# Patient Record
Sex: FEMALE | Race: WHITE | Employment: UNEMPLOYED | URBAN - METROPOLITAN AREA
[De-identification: names, ages, dates, MRNs, and addresses within clinical notes are randomized per-mention and may not be internally consistent; named-entity substitution may affect disease eponyms.]

---

## 2017-01-03 ENCOUNTER — HOSPITAL ENCOUNTER (OUTPATIENT)
Age: 27
Discharge: HOME | End: 2017-01-03
Attending: OBSTETRICS & GYNECOLOGY | Admitting: OBSTETRICS & GYNECOLOGY
Payer: COMMERCIAL

## 2017-01-03 ENCOUNTER — ROUTINE PRENATAL (OUTPATIENT)
Dept: OBGYN | Age: 27
End: 2017-01-03

## 2017-01-03 VITALS
RESPIRATION RATE: 18 BRPM | HEART RATE: 67 BPM | TEMPERATURE: 97.6 F | SYSTOLIC BLOOD PRESSURE: 104 MMHG | DIASTOLIC BLOOD PRESSURE: 70 MMHG

## 2017-01-03 VITALS
WEIGHT: 110 LBS | DIASTOLIC BLOOD PRESSURE: 64 MMHG | SYSTOLIC BLOOD PRESSURE: 92 MMHG | HEART RATE: 76 BPM | BODY MASS INDEX: 21.66 KG/M2

## 2017-01-03 DIAGNOSIS — Z34.83 ENCOUNTER FOR SUPERVISION OF OTHER NORMAL PREGNANCY, THIRD TRIMESTER: Primary | ICD-10-CM

## 2017-01-03 DIAGNOSIS — Z34.83 ENCOUNTER FOR SUPERVISION OF OTHER NORMAL PREGNANCY, THIRD TRIMESTER: ICD-10-CM

## 2017-01-03 LAB
BACTERIA: ABNORMAL /HPF
BACTERIA: ABNORMAL /HPF
BILIRUBIN URINE: ABNORMAL
BILIRUBIN URINE: NEGATIVE
BILIRUBIN, POC: ABNORMAL
BLOOD URINE, POC: ABNORMAL
BLOOD, URINE: ABNORMAL
BLOOD, URINE: NEGATIVE
CLARITY, POC: CLEAR
CLARITY: ABNORMAL
CLARITY: CLEAR
COLOR, POC: YELLOW
COLOR: ABNORMAL
COLOR: YELLOW
EPITHELIAL CELLS, UA: ABNORMAL /HPF
EPITHELIAL CELLS, UA: ABNORMAL /HPF
GLUCOSE URINE, POC: ABNORMAL
GLUCOSE URINE: NEGATIVE MG/DL
GLUCOSE URINE: NEGATIVE MG/DL
KETONES, POC: ABNORMAL
KETONES, URINE: ABNORMAL MG/DL
KETONES, URINE: NEGATIVE MG/DL
LEUKOCYTE EST, POC: ABNORMAL
LEUKOCYTE ESTERASE, URINE: ABNORMAL
LEUKOCYTE ESTERASE, URINE: ABNORMAL
NITRITE, POC: ABNORMAL
NITRITE, URINE: NEGATIVE
NITRITE, URINE: NEGATIVE
PH UA: 7 (ref 5–9)
PH UA: 7.5 (ref 5–9)
PH, POC: 6.5
PROTEIN UA: ABNORMAL MG/DL
PROTEIN UA: NEGATIVE MG/DL
PROTEIN, POC: ABNORMAL
RBC UA: ABNORMAL /HPF (ref 0–2)
RBC UA: ABNORMAL /HPF (ref 0–2)
SPECIFIC GRAVITY UA: 1.01 (ref 1–1.03)
SPECIFIC GRAVITY UA: 1.02 (ref 1–1.03)
SPECIFIC GRAVITY, POC: 1.02
UROBILINOGEN, POC: ABNORMAL
UROBILINOGEN, URINE: 0.2 E.U./DL
UROBILINOGEN, URINE: 1 E.U./DL
WBC UA: ABNORMAL /HPF (ref 0–5)
WBC UA: ABNORMAL /HPF (ref 0–5)
YEAST: PRESENT

## 2017-01-03 PROCEDURE — 59025 FETAL NON-STRESS TEST: CPT

## 2017-01-03 PROCEDURE — S0028 INJECTION, FAMOTIDINE, 20 MG: HCPCS | Performed by: OBSTETRICS & GYNECOLOGY

## 2017-01-03 PROCEDURE — 2580000003 HC RX 258: Performed by: OBSTETRICS & GYNECOLOGY

## 2017-01-03 PROCEDURE — 96374 THER/PROPH/DIAG INJ IV PUSH: CPT

## 2017-01-03 PROCEDURE — 81001 URINALYSIS AUTO W/SCOPE: CPT

## 2017-01-03 PROCEDURE — 96361 HYDRATE IV INFUSION ADD-ON: CPT

## 2017-01-03 PROCEDURE — 6360000002 HC RX W HCPCS: Performed by: OBSTETRICS & GYNECOLOGY

## 2017-01-03 PROCEDURE — 99213 OFFICE O/P EST LOW 20 MIN: CPT | Performed by: OBSTETRICS & GYNECOLOGY

## 2017-01-03 PROCEDURE — 59025 FETAL NON-STRESS TEST: CPT | Performed by: OBSTETRICS & GYNECOLOGY

## 2017-01-03 PROCEDURE — 99219 PR INITIAL OBSERVATION CARE/DAY 50 MINUTES: CPT | Performed by: OBSTETRICS & GYNECOLOGY

## 2017-01-03 PROCEDURE — 99284 EMERGENCY DEPT VISIT MOD MDM: CPT

## 2017-01-03 PROCEDURE — 2500000003 HC RX 250 WO HCPCS: Performed by: OBSTETRICS & GYNECOLOGY

## 2017-01-03 PROCEDURE — 96375 TX/PRO/DX INJ NEW DRUG ADDON: CPT

## 2017-01-03 RX ORDER — ACETAMINOPHEN 325 MG/1
650 TABLET ORAL EVERY 4 HOURS PRN
Status: DISCONTINUED | OUTPATIENT
Start: 2017-01-03 | End: 2017-01-04 | Stop reason: HOSPADM

## 2017-01-03 RX ORDER — ONDANSETRON 2 MG/ML
4 INJECTION INTRAMUSCULAR; INTRAVENOUS EVERY 6 HOURS PRN
Status: DISCONTINUED | OUTPATIENT
Start: 2017-01-03 | End: 2017-01-04 | Stop reason: HOSPADM

## 2017-01-03 RX ORDER — SODIUM CHLORIDE, SODIUM LACTATE, POTASSIUM CHLORIDE, AND CALCIUM CHLORIDE .6; .31; .03; .02 G/100ML; G/100ML; G/100ML; G/100ML
1000 INJECTION, SOLUTION INTRAVENOUS ONCE
Status: COMPLETED | OUTPATIENT
Start: 2017-01-03 | End: 2017-01-03

## 2017-01-03 RX ORDER — NALBUPHINE HCL 10 MG/ML
10 AMPUL (ML) INJECTION
Status: DISCONTINUED | OUTPATIENT
Start: 2017-01-03 | End: 2017-01-04 | Stop reason: HOSPADM

## 2017-01-03 RX ORDER — SODIUM CHLORIDE, SODIUM LACTATE, POTASSIUM CHLORIDE, CALCIUM CHLORIDE 600; 310; 30; 20 MG/100ML; MG/100ML; MG/100ML; MG/100ML
INJECTION, SOLUTION INTRAVENOUS CONTINUOUS
Status: DISCONTINUED | OUTPATIENT
Start: 2017-01-03 | End: 2017-01-04 | Stop reason: HOSPADM

## 2017-01-03 RX ADMIN — FAMOTIDINE 20 MG: 10 INJECTION, SOLUTION INTRAVENOUS at 21:19

## 2017-01-03 RX ADMIN — NALBUPHINE HYDROCHLORIDE 10 MG: 10 INJECTION, SOLUTION INTRAMUSCULAR; INTRAVENOUS; SUBCUTANEOUS at 19:57

## 2017-01-03 RX ADMIN — SODIUM CHLORIDE, POTASSIUM CHLORIDE, SODIUM LACTATE AND CALCIUM CHLORIDE 1000 ML: 600; 310; 30; 20 INJECTION, SOLUTION INTRAVENOUS at 19:56

## 2017-01-03 ASSESSMENT — PAIN SCALES - GENERAL: PAINLEVEL_OUTOF10: 6

## 2017-01-05 LAB — URINE CULTURE, ROUTINE: NORMAL

## 2017-01-10 ENCOUNTER — HOSPITAL ENCOUNTER (OUTPATIENT)
Age: 27
Discharge: HOME | End: 2017-01-11
Attending: OBSTETRICS & GYNECOLOGY | Admitting: OBSTETRICS & GYNECOLOGY
Payer: COMMERCIAL

## 2017-01-11 ENCOUNTER — TELEPHONE (OUTPATIENT)
Dept: OBGYN | Age: 27
End: 2017-01-11

## 2017-01-11 VITALS
SYSTOLIC BLOOD PRESSURE: 128 MMHG | TEMPERATURE: 97.6 F | HEART RATE: 75 BPM | WEIGHT: 109.56 LBS | BODY MASS INDEX: 21.58 KG/M2 | RESPIRATION RATE: 18 BRPM | DIASTOLIC BLOOD PRESSURE: 77 MMHG

## 2017-01-11 PROCEDURE — 59025 FETAL NON-STRESS TEST: CPT

## 2017-01-11 PROCEDURE — 59025 FETAL NON-STRESS TEST: CPT | Performed by: OBSTETRICS & GYNECOLOGY

## 2017-01-11 PROCEDURE — 99213 OFFICE O/P EST LOW 20 MIN: CPT | Performed by: OBSTETRICS & GYNECOLOGY

## 2017-01-11 PROCEDURE — 84112 EVAL AMNIOTIC FLUID PROTEIN: CPT

## 2017-01-11 PROCEDURE — 99283 EMERGENCY DEPT VISIT LOW MDM: CPT

## 2017-01-11 RX ORDER — ONDANSETRON 2 MG/ML
4 INJECTION INTRAMUSCULAR; INTRAVENOUS EVERY 6 HOURS PRN
Status: DISCONTINUED | OUTPATIENT
Start: 2017-01-11 | End: 2017-01-11 | Stop reason: HOSPADM

## 2017-01-11 RX ORDER — ACETAMINOPHEN 325 MG/1
650 TABLET ORAL EVERY 4 HOURS PRN
Status: DISCONTINUED | OUTPATIENT
Start: 2017-01-11 | End: 2017-01-11 | Stop reason: HOSPADM

## 2017-01-16 ENCOUNTER — ROUTINE PRENATAL (OUTPATIENT)
Dept: OBGYN | Age: 27
End: 2017-01-16

## 2017-01-16 VITALS
BODY MASS INDEX: 21.47 KG/M2 | WEIGHT: 109 LBS | SYSTOLIC BLOOD PRESSURE: 100 MMHG | HEART RATE: 82 BPM | DIASTOLIC BLOOD PRESSURE: 70 MMHG

## 2017-01-16 DIAGNOSIS — O34.219 PREVIOUS CESAREAN DELIVERY AFFECTING PREGNANCY: ICD-10-CM

## 2017-01-16 DIAGNOSIS — A74.9 CHLAMYDIA INFECTION AFFECTING PREGNANCY: ICD-10-CM

## 2017-01-16 DIAGNOSIS — O98.819 CHLAMYDIA INFECTION AFFECTING PREGNANCY: ICD-10-CM

## 2017-01-16 DIAGNOSIS — Z34.83 ENCOUNTER FOR SUPERVISION OF OTHER NORMAL PREGNANCY, THIRD TRIMESTER: Primary | ICD-10-CM

## 2017-01-16 PROCEDURE — 99213 OFFICE O/P EST LOW 20 MIN: CPT | Performed by: OBSTETRICS & GYNECOLOGY

## 2017-01-17 ENCOUNTER — HOSPITAL ENCOUNTER (OUTPATIENT)
Age: 27
Discharge: HOME | End: 2017-01-17
Attending: OBSTETRICS & GYNECOLOGY | Admitting: OBSTETRICS & GYNECOLOGY
Payer: COMMERCIAL

## 2017-01-17 VITALS
SYSTOLIC BLOOD PRESSURE: 123 MMHG | HEART RATE: 93 BPM | DIASTOLIC BLOOD PRESSURE: 78 MMHG | TEMPERATURE: 97.7 F | RESPIRATION RATE: 18 BRPM

## 2017-01-17 LAB
BILIRUBIN URINE: NEGATIVE
BLOOD, URINE: NEGATIVE
CLARITY: CLEAR
COLOR: YELLOW
GLUCOSE URINE: NEGATIVE MG/DL
KETONES, URINE: NEGATIVE MG/DL
LEUKOCYTE ESTERASE, URINE: ABNORMAL
NITRITE, URINE: NEGATIVE
PH UA: 6.5 (ref 5–9)
PROTEIN UA: NEGATIVE MG/DL
SPECIFIC GRAVITY UA: 1.01 (ref 1–1.03)
UROBILINOGEN, URINE: 0.2 E.U./DL

## 2017-01-17 PROCEDURE — 84112 EVAL AMNIOTIC FLUID PROTEIN: CPT

## 2017-01-17 PROCEDURE — 99283 EMERGENCY DEPT VISIT LOW MDM: CPT

## 2017-01-17 PROCEDURE — 99283 EMERGENCY DEPT VISIT LOW MDM: CPT | Performed by: OBSTETRICS & GYNECOLOGY

## 2017-01-17 PROCEDURE — 59025 FETAL NON-STRESS TEST: CPT

## 2017-01-17 PROCEDURE — 81001 URINALYSIS AUTO W/SCOPE: CPT

## 2017-01-17 PROCEDURE — 80307 DRUG TEST PRSMV CHEM ANLYZR: CPT

## 2017-01-18 LAB
AMPHETAMINE SCREEN, URINE: ABNORMAL
BACTERIA: NORMAL /HPF
BARBITURATE SCREEN URINE: POSITIVE
BENZODIAZEPINE SCREEN, URINE: ABNORMAL
CANNABINOID SCREEN URINE: ABNORMAL
COCAINE METABOLITE SCREEN URINE: ABNORMAL
EPITHELIAL CELLS, UA: NORMAL /HPF
Lab: ABNORMAL
OPIATE SCREEN URINE: ABNORMAL
PHENCYCLIDINE SCREEN URINE: ABNORMAL
RBC UA: NORMAL /HPF (ref 0–2)
WBC UA: NORMAL /HPF (ref 0–5)

## 2017-01-19 ENCOUNTER — HOSPITAL ENCOUNTER (INPATIENT)
Age: 27
LOS: 2 days | Discharge: HOME | DRG: 560 | End: 2017-01-21
Attending: OBSTETRICS & GYNECOLOGY | Admitting: OBSTETRICS & GYNECOLOGY
Payer: COMMERCIAL

## 2017-01-19 LAB
ALBUMIN SERPL-MCNC: 4 G/DL (ref 3.9–4.9)
ALP BLD-CCNC: 256 U/L (ref 40–130)
ALT SERPL-CCNC: 12 U/L (ref 0–33)
AMPHETAMINE SCREEN, URINE: ABNORMAL
ANION GAP SERPL CALCULATED.3IONS-SCNC: 14 MEQ/L (ref 7–13)
AST SERPL-CCNC: 17 U/L (ref 0–35)
BACTERIA: NORMAL /HPF
BARBITURATE SCREEN URINE: POSITIVE
BASOPHILS ABSOLUTE: 0 K/UL (ref 0–0.2)
BASOPHILS RELATIVE PERCENT: 0.3 %
BENZODIAZEPINE SCREEN, URINE: ABNORMAL
BILIRUB SERPL-MCNC: 0.2 MG/DL (ref 0–1.2)
BILIRUBIN URINE: NEGATIVE
BLOOD, URINE: NEGATIVE
BUN BLDV-MCNC: 6 MG/DL (ref 6–20)
CALCIUM SERPL-MCNC: 8.8 MG/DL (ref 8.6–10.2)
CANNABINOID SCREEN URINE: ABNORMAL
CHLORIDE BLD-SCNC: 99 MEQ/L (ref 98–107)
CLARITY: ABNORMAL
CO2: 19 MEQ/L (ref 22–29)
COCAINE METABOLITE SCREEN URINE: ABNORMAL
COLOR: YELLOW
CREAT SERPL-MCNC: 0.37 MG/DL (ref 0.5–0.9)
EOSINOPHILS ABSOLUTE: 0.1 K/UL (ref 0–0.7)
EOSINOPHILS RELATIVE PERCENT: 0.9 %
EPITHELIAL CELLS, UA: NORMAL /HPF
GFR AFRICAN AMERICAN: >60
GFR NON-AFRICAN AMERICAN: >60
GLOBULIN: 2.6 G/DL (ref 2.3–3.5)
GLUCOSE BLD-MCNC: 80 MG/DL (ref 74–109)
GLUCOSE URINE: NEGATIVE MG/DL
HCT VFR BLD CALC: 34.2 % (ref 37–47)
HEMOGLOBIN: 11 G/DL (ref 12–16)
HEPATITIS B SURFACE ANTIGEN INTERPRETATION: NORMAL
KETONES, URINE: NEGATIVE MG/DL
LEUKOCYTE ESTERASE, URINE: ABNORMAL
LYMPHOCYTES ABSOLUTE: 2.9 K/UL (ref 1–4.8)
LYMPHOCYTES RELATIVE PERCENT: 25.2 %
Lab: ABNORMAL
MCH RBC QN AUTO: 30 PG (ref 27–31.3)
MCHC RBC AUTO-ENTMCNC: 32.2 % (ref 33–37)
MCV RBC AUTO: 93.3 FL (ref 82–100)
MONOCYTES ABSOLUTE: 0.5 K/UL (ref 0.2–0.8)
MONOCYTES RELATIVE PERCENT: 4.6 %
NEUTROPHILS ABSOLUTE: 8 K/UL (ref 1.4–6.5)
NEUTROPHILS RELATIVE PERCENT: 69 %
NITRITE, URINE: NEGATIVE
OPIATE SCREEN URINE: ABNORMAL
PDW BLD-RTO: 12.8 % (ref 11.5–14.5)
PH UA: 6 (ref 5–9)
PHENCYCLIDINE SCREEN URINE: ABNORMAL
PLATELET # BLD: 252 K/UL (ref 130–400)
POTASSIUM SERPL-SCNC: 4 MEQ/L (ref 3.5–5.1)
PROTEIN UA: NEGATIVE MG/DL
RBC # BLD: 3.67 M/UL (ref 4.2–5.4)
RBC UA: NORMAL /HPF (ref 0–2)
SODIUM BLD-SCNC: 132 MEQ/L (ref 132–144)
SPECIFIC GRAVITY UA: 1.01 (ref 1–1.03)
TOTAL PROTEIN: 6.6 G/DL (ref 6.4–8.1)
UROBILINOGEN, URINE: 0.2 E.U./DL
WBC # BLD: 11.6 K/UL (ref 4.8–10.8)
WBC UA: NORMAL /HPF (ref 0–5)

## 2017-01-19 PROCEDURE — 6360000002 HC RX W HCPCS: Performed by: OBSTETRICS & GYNECOLOGY

## 2017-01-19 PROCEDURE — 1220000000 HC SEMI PRIVATE OB R&B

## 2017-01-19 PROCEDURE — 36415 COLL VENOUS BLD VENIPUNCTURE: CPT

## 2017-01-19 PROCEDURE — 86592 SYPHILIS TEST NON-TREP QUAL: CPT

## 2017-01-19 PROCEDURE — 86901 BLOOD TYPING SEROLOGIC RH(D): CPT

## 2017-01-19 PROCEDURE — 2580000003 HC RX 258: Performed by: OBSTETRICS & GYNECOLOGY

## 2017-01-19 PROCEDURE — 3E0P7GC INTRODUCTION OF OTHER THERAPEUTIC SUBSTANCE INTO FEMALE REPRODUCTIVE, VIA NATURAL OR ARTIFICIAL OPENING: ICD-10-PCS | Performed by: OBSTETRICS & GYNECOLOGY

## 2017-01-19 PROCEDURE — 86850 RBC ANTIBODY SCREEN: CPT

## 2017-01-19 PROCEDURE — 80053 COMPREHEN METABOLIC PANEL: CPT

## 2017-01-19 PROCEDURE — 81001 URINALYSIS AUTO W/SCOPE: CPT

## 2017-01-19 PROCEDURE — 4A1HXCZ MONITORING OF PRODUCTS OF CONCEPTION, CARDIAC RATE, EXTERNAL APPROACH: ICD-10-PCS | Performed by: OBSTETRICS & GYNECOLOGY

## 2017-01-19 PROCEDURE — 6370000000 HC RX 637 (ALT 250 FOR IP): Performed by: OBSTETRICS & GYNECOLOGY

## 2017-01-19 PROCEDURE — 86900 BLOOD TYPING SEROLOGIC ABO: CPT

## 2017-01-19 PROCEDURE — 85025 COMPLETE CBC W/AUTO DIFF WBC: CPT

## 2017-01-19 PROCEDURE — 80307 DRUG TEST PRSMV CHEM ANLYZR: CPT

## 2017-01-19 PROCEDURE — 87340 HEPATITIS B SURFACE AG IA: CPT

## 2017-01-19 RX ORDER — SODIUM CHLORIDE 0.9 % (FLUSH) 0.9 %
10 SYRINGE (ML) INJECTION PRN
Status: DISCONTINUED | OUTPATIENT
Start: 2017-01-19 | End: 2017-01-20

## 2017-01-19 RX ORDER — DOCUSATE SODIUM 100 MG/1
100 CAPSULE, LIQUID FILLED ORAL 2 TIMES DAILY
Status: DISCONTINUED | OUTPATIENT
Start: 2017-01-19 | End: 2017-01-20 | Stop reason: SDUPTHER

## 2017-01-19 RX ORDER — ONDANSETRON 2 MG/ML
4 INJECTION INTRAMUSCULAR; INTRAVENOUS EVERY 6 HOURS PRN
Status: DISCONTINUED | OUTPATIENT
Start: 2017-01-19 | End: 2017-01-20

## 2017-01-19 RX ORDER — ZOLPIDEM TARTRATE 5 MG/1
5 TABLET ORAL NIGHTLY PRN
Status: DISCONTINUED | OUTPATIENT
Start: 2017-01-19 | End: 2017-01-20

## 2017-01-19 RX ORDER — VANCOMYCIN HYDROCHLORIDE 1 G/200ML
1000 INJECTION, SOLUTION INTRAVENOUS EVERY 12 HOURS
Status: DISCONTINUED | OUTPATIENT
Start: 2017-01-19 | End: 2017-01-19

## 2017-01-19 RX ORDER — SODIUM CHLORIDE 0.9 % (FLUSH) 0.9 %
10 SYRINGE (ML) INJECTION EVERY 12 HOURS SCHEDULED
Status: DISCONTINUED | OUTPATIENT
Start: 2017-01-19 | End: 2017-01-20 | Stop reason: SDUPTHER

## 2017-01-19 RX ORDER — NALBUPHINE HCL 10 MG/ML
10 AMPUL (ML) INJECTION EVERY 4 HOURS PRN
Status: DISCONTINUED | OUTPATIENT
Start: 2017-01-19 | End: 2017-01-20

## 2017-01-19 RX ORDER — SODIUM CHLORIDE, SODIUM LACTATE, POTASSIUM CHLORIDE, CALCIUM CHLORIDE 600; 310; 30; 20 MG/100ML; MG/100ML; MG/100ML; MG/100ML
INJECTION, SOLUTION INTRAVENOUS CONTINUOUS
Status: DISCONTINUED | OUTPATIENT
Start: 2017-01-19 | End: 2017-01-20

## 2017-01-19 RX ORDER — ACETAMINOPHEN 500 MG
1000 TABLET ORAL EVERY 6 HOURS PRN
Status: DISCONTINUED | OUTPATIENT
Start: 2017-01-19 | End: 2017-01-20 | Stop reason: SDUPTHER

## 2017-01-19 RX ADMIN — SODIUM CHLORIDE, POTASSIUM CHLORIDE, SODIUM LACTATE AND CALCIUM CHLORIDE: 600; 310; 30; 20 INJECTION, SOLUTION INTRAVENOUS at 21:31

## 2017-01-19 RX ADMIN — VANCOMYCIN HYDROCHLORIDE: 1 INJECTION, POWDER, LYOPHILIZED, FOR SOLUTION INTRAVENOUS at 22:26

## 2017-01-19 RX ADMIN — Medication 1 MILLI-UNITS/MIN: at 22:13

## 2017-01-19 RX ADMIN — ZOLPIDEM TARTRATE 5 MG: 5 TABLET, FILM COATED ORAL at 22:49

## 2017-01-20 ENCOUNTER — ANESTHESIA EVENT (OUTPATIENT)
Dept: LABOR AND DELIVERY | Age: 27
DRG: 560 | End: 2017-01-20
Payer: COMMERCIAL

## 2017-01-20 ENCOUNTER — ANESTHESIA (OUTPATIENT)
Dept: LABOR AND DELIVERY | Age: 27
DRG: 560 | End: 2017-01-20
Payer: COMMERCIAL

## 2017-01-20 VITALS — DIASTOLIC BLOOD PRESSURE: 64 MMHG | SYSTOLIC BLOOD PRESSURE: 108 MMHG

## 2017-01-20 LAB
ABO/RH: NORMAL
ANTIBODY SCREEN: NORMAL
RPR: NORMAL

## 2017-01-20 PROCEDURE — 2500000003 HC RX 250 WO HCPCS: Performed by: OBSTETRICS & GYNECOLOGY

## 2017-01-20 PROCEDURE — 2580000003 HC RX 258: Performed by: OBSTETRICS & GYNECOLOGY

## 2017-01-20 PROCEDURE — 6370000000 HC RX 637 (ALT 250 FOR IP): Performed by: OBSTETRICS & GYNECOLOGY

## 2017-01-20 PROCEDURE — 59409 OBSTETRICAL CARE: CPT | Performed by: OBSTETRICS & GYNECOLOGY

## 2017-01-20 PROCEDURE — 6360000002 HC RX W HCPCS: Performed by: OBSTETRICS & GYNECOLOGY

## 2017-01-20 PROCEDURE — 1220000000 HC SEMI PRIVATE OB R&B

## 2017-01-20 PROCEDURE — 2500000003 HC RX 250 WO HCPCS

## 2017-01-20 PROCEDURE — 3700000025 ANESTHESIA EPIDURAL BLOCK: Performed by: NURSE ANESTHETIST, CERTIFIED REGISTERED

## 2017-01-20 PROCEDURE — 7200000001 HC VAGINAL DELIVERY

## 2017-01-20 RX ORDER — IBUPROFEN 600 MG/1
600 TABLET ORAL EVERY 6 HOURS PRN
Status: DISCONTINUED | OUTPATIENT
Start: 2017-01-20 | End: 2017-01-20

## 2017-01-20 RX ORDER — MAGNESIUM OXIDE 400 MG/1
400 TABLET ORAL DAILY
Status: ON HOLD | COMMUNITY
End: 2017-01-21 | Stop reason: HOSPADM

## 2017-01-20 RX ORDER — FERROUS SULFATE 325(65) MG
325 TABLET ORAL
COMMUNITY
End: 2017-08-11 | Stop reason: SDUPTHER

## 2017-01-20 RX ORDER — OXYCODONE HYDROCHLORIDE AND ACETAMINOPHEN 5; 325 MG/1; MG/1
1 TABLET ORAL EVERY 4 HOURS PRN
Status: DISCONTINUED | OUTPATIENT
Start: 2017-01-20 | End: 2017-01-21 | Stop reason: HOSPADM

## 2017-01-20 RX ORDER — RANITIDINE 150 MG/1
150 TABLET ORAL 2 TIMES DAILY
Status: ON HOLD | COMMUNITY
End: 2017-01-21 | Stop reason: HOSPADM

## 2017-01-20 RX ORDER — FAMOTIDINE 20 MG/1
20 TABLET, FILM COATED ORAL 2 TIMES DAILY
Status: DISCONTINUED | OUTPATIENT
Start: 2017-01-20 | End: 2017-01-21 | Stop reason: HOSPADM

## 2017-01-20 RX ORDER — SODIUM CHLORIDE 0.9 % (FLUSH) 0.9 %
10 SYRINGE (ML) INJECTION EVERY 12 HOURS SCHEDULED
Status: DISCONTINUED | OUTPATIENT
Start: 2017-01-20 | End: 2017-01-21 | Stop reason: HOSPADM

## 2017-01-20 RX ORDER — ASCORBIC ACID, CHOLECALCIFEROL, .ALPHA.-TOCOPHEROL ACETATE, DL-, PYRIDOXINE HYDROCHLORIDE, FOLIC ACID, CYANOCOBALAMIN, BIOTIN, CALCIUM CARBONATE, FERROUS ASPARTO GLYCINATE, IRON, POTASSIUM IODIDE, MAGNESIUM OXIDE, DOCONEXENT AND LOWBUSH BLUEBERRY 60; 1000; 10; 26; 400; 13; 280; 80; 9; 9; 150; 25; 350; 25; 600 MG/1; [IU]/1; [IU]/1; MG/1; UG/1; UG/1; UG/1; MG/1; MG/1; MG/1; UG/1; MG/1; MG/1; MG/1; UG/1
CAPSULE, GELATIN COATED ORAL
Status: ON HOLD | COMMUNITY
End: 2017-01-21 | Stop reason: HOSPADM

## 2017-01-20 RX ORDER — SODIUM CHLORIDE, SODIUM LACTATE, POTASSIUM CHLORIDE, CALCIUM CHLORIDE 600; 310; 30; 20 MG/100ML; MG/100ML; MG/100ML; MG/100ML
INJECTION, SOLUTION INTRAVENOUS CONTINUOUS
Status: DISCONTINUED | OUTPATIENT
Start: 2017-01-20 | End: 2017-01-21 | Stop reason: HOSPADM

## 2017-01-20 RX ORDER — OMEPRAZOLE 20 MG/1
20 CAPSULE, DELAYED RELEASE ORAL DAILY
Status: ON HOLD | COMMUNITY
End: 2017-01-21 | Stop reason: HOSPADM

## 2017-01-20 RX ORDER — LANOLIN 100 %
OINTMENT (GRAM) TOPICAL PRN
Status: DISCONTINUED | OUTPATIENT
Start: 2017-01-20 | End: 2017-01-21 | Stop reason: HOSPADM

## 2017-01-20 RX ORDER — ONDANSETRON 2 MG/ML
4 INJECTION INTRAMUSCULAR; INTRAVENOUS EVERY 6 HOURS PRN
Status: DISCONTINUED | OUTPATIENT
Start: 2017-01-20 | End: 2017-01-21 | Stop reason: HOSPADM

## 2017-01-20 RX ORDER — FERROUS SULFATE 325(65) MG
325 TABLET ORAL 2 TIMES DAILY WITH MEALS
Status: DISCONTINUED | OUTPATIENT
Start: 2017-01-20 | End: 2017-01-21 | Stop reason: HOSPADM

## 2017-01-20 RX ORDER — NALOXONE HYDROCHLORIDE 0.4 MG/ML
0.4 INJECTION, SOLUTION INTRAMUSCULAR; INTRAVENOUS; SUBCUTANEOUS PRN
Status: DISCONTINUED | OUTPATIENT
Start: 2017-01-20 | End: 2017-01-20

## 2017-01-20 RX ORDER — CLINDAMYCIN PHOSPHATE 900 MG/50ML
900 INJECTION INTRAVENOUS EVERY 8 HOURS
Status: DISCONTINUED | OUTPATIENT
Start: 2017-01-20 | End: 2017-01-20

## 2017-01-20 RX ORDER — SODIUM CHLORIDE 0.9 % (FLUSH) 0.9 %
10 SYRINGE (ML) INJECTION PRN
Status: DISCONTINUED | OUTPATIENT
Start: 2017-01-20 | End: 2017-01-21 | Stop reason: HOSPADM

## 2017-01-20 RX ORDER — OXYCODONE HYDROCHLORIDE AND ACETAMINOPHEN 5; 325 MG/1; MG/1
2 TABLET ORAL EVERY 4 HOURS PRN
Status: DISCONTINUED | OUTPATIENT
Start: 2017-01-20 | End: 2017-01-21 | Stop reason: HOSPADM

## 2017-01-20 RX ORDER — DIPHENHYDRAMINE HYDROCHLORIDE 50 MG/ML
25 INJECTION INTRAMUSCULAR; INTRAVENOUS EVERY 4 HOURS PRN
Status: DISCONTINUED | OUTPATIENT
Start: 2017-01-20 | End: 2017-01-20

## 2017-01-20 RX ORDER — METOCLOPRAMIDE 10 MG/1
10 TABLET ORAL 4 TIMES DAILY
Status: ON HOLD | COMMUNITY
End: 2017-01-21 | Stop reason: HOSPADM

## 2017-01-20 RX ORDER — ONDANSETRON 2 MG/ML
4 INJECTION INTRAMUSCULAR; INTRAVENOUS EVERY 6 HOURS PRN
Status: DISCONTINUED | OUTPATIENT
Start: 2017-01-20 | End: 2017-01-20

## 2017-01-20 RX ORDER — NALBUPHINE HCL 10 MG/ML
5 AMPUL (ML) INJECTION EVERY 4 HOURS PRN
Status: DISCONTINUED | OUTPATIENT
Start: 2017-01-20 | End: 2017-01-20

## 2017-01-20 RX ORDER — DOCUSATE SODIUM 100 MG/1
100 CAPSULE, LIQUID FILLED ORAL 2 TIMES DAILY
Status: DISCONTINUED | OUTPATIENT
Start: 2017-01-20 | End: 2017-01-21 | Stop reason: HOSPADM

## 2017-01-20 RX ORDER — POLYETHYLENE GLYCOL 3350 17 G/17G
17 POWDER, FOR SOLUTION ORAL DAILY
Status: ON HOLD | COMMUNITY
End: 2017-01-21 | Stop reason: HOSPADM

## 2017-01-20 RX ORDER — .PYRIDOXINE HYDROCHLORIDE, CYANOCOBALAMIN, CALCIUM CARBONATE AND FOLIC ACID 75; 12; 200; 1 MG/1; UG/1; MG/1; MG/1
TABLET, COATED ORAL
Status: ON HOLD | COMMUNITY
End: 2017-01-21 | Stop reason: HOSPADM

## 2017-01-20 RX ORDER — ACETAMINOPHEN 325 MG/1
650 TABLET ORAL EVERY 4 HOURS PRN
Status: DISCONTINUED | OUTPATIENT
Start: 2017-01-20 | End: 2017-01-21 | Stop reason: HOSPADM

## 2017-01-20 RX ADMIN — Medication: at 17:29

## 2017-01-20 RX ADMIN — Medication 1 MILLI-UNITS/MIN: at 09:00

## 2017-01-20 RX ADMIN — ONDANSETRON 4 MG: 2 INJECTION INTRAMUSCULAR; INTRAVENOUS at 09:07

## 2017-01-20 RX ADMIN — FERROUS SULFATE TAB 325 MG (65 MG ELEMENTAL FE) 325 MG: 325 (65 FE) TAB at 17:29

## 2017-01-20 RX ADMIN — FAMOTIDINE 20 MG: 20 TABLET ORAL at 15:32

## 2017-01-20 RX ADMIN — Medication 12 ML/HR: at 08:49

## 2017-01-20 RX ADMIN — SODIUM CHLORIDE, PRESERVATIVE FREE 10 ML: 5 INJECTION INTRAVENOUS at 19:57

## 2017-01-20 RX ADMIN — CLINDAMYCIN IN 5 PERCENT DEXTROSE 900 MG: 18 INJECTION, SOLUTION INTRAVENOUS at 09:08

## 2017-01-20 RX ADMIN — FAMOTIDINE 20 MG: 20 TABLET ORAL at 19:56

## 2017-01-20 RX ADMIN — SODIUM CHLORIDE, POTASSIUM CHLORIDE, SODIUM LACTATE AND CALCIUM CHLORIDE: 600; 310; 30; 20 INJECTION, SOLUTION INTRAVENOUS at 05:50

## 2017-01-20 RX ADMIN — DOCUSATE SODIUM 100 MG: 100 CAPSULE, LIQUID FILLED ORAL at 19:56

## 2017-01-20 RX ADMIN — SODIUM CHLORIDE, POTASSIUM CHLORIDE, SODIUM LACTATE AND CALCIUM CHLORIDE: 600; 310; 30; 20 INJECTION, SOLUTION INTRAVENOUS at 14:08

## 2017-01-20 RX ADMIN — DOCUSATE SODIUM 100 MG: 100 CAPSULE, LIQUID FILLED ORAL at 15:32

## 2017-01-20 RX ADMIN — SODIUM CHLORIDE, POTASSIUM CHLORIDE, SODIUM LACTATE AND CALCIUM CHLORIDE: 600; 310; 30; 20 INJECTION, SOLUTION INTRAVENOUS at 08:41

## 2017-01-20 RX ADMIN — DIPHENHYDRAMINE HYDROCHLORIDE 25 MG: 50 INJECTION, SOLUTION INTRAMUSCULAR; INTRAVENOUS at 00:46

## 2017-01-21 VITALS
RESPIRATION RATE: 20 BRPM | OXYGEN SATURATION: 98 % | DIASTOLIC BLOOD PRESSURE: 70 MMHG | HEART RATE: 52 BPM | SYSTOLIC BLOOD PRESSURE: 110 MMHG | TEMPERATURE: 97.7 F

## 2017-01-21 LAB
BASOPHILS ABSOLUTE: 0 K/UL (ref 0–0.2)
BASOPHILS RELATIVE PERCENT: 0.3 %
EOSINOPHILS ABSOLUTE: 0.1 K/UL (ref 0–0.7)
EOSINOPHILS RELATIVE PERCENT: 1.1 %
HCT VFR BLD CALC: 25.3 % (ref 37–47)
HEMOGLOBIN: 8.6 G/DL (ref 12–16)
LYMPHOCYTES ABSOLUTE: 3.3 K/UL (ref 1–4.8)
LYMPHOCYTES RELATIVE PERCENT: 26.8 %
MCH RBC QN AUTO: 31.2 PG (ref 27–31.3)
MCHC RBC AUTO-ENTMCNC: 33.9 % (ref 33–37)
MCV RBC AUTO: 91.9 FL (ref 82–100)
MONOCYTES ABSOLUTE: 1.1 K/UL (ref 0.2–0.8)
MONOCYTES RELATIVE PERCENT: 8.5 %
NEUTROPHILS ABSOLUTE: 7.8 K/UL (ref 1.4–6.5)
NEUTROPHILS RELATIVE PERCENT: 63.3 %
PDW BLD-RTO: 13 % (ref 11.5–14.5)
PLATELET # BLD: 177 K/UL (ref 130–400)
RBC # BLD: 2.75 M/UL (ref 4.2–5.4)
WBC # BLD: 12.4 K/UL (ref 4.8–10.8)

## 2017-01-21 PROCEDURE — 7200000001 HC VAGINAL DELIVERY

## 2017-01-21 PROCEDURE — 85025 COMPLETE CBC W/AUTO DIFF WBC: CPT

## 2017-01-21 PROCEDURE — 99238 HOSP IP/OBS DSCHRG MGMT 30/<: CPT | Performed by: OBSTETRICS & GYNECOLOGY

## 2017-01-21 PROCEDURE — 6370000000 HC RX 637 (ALT 250 FOR IP): Performed by: OBSTETRICS & GYNECOLOGY

## 2017-01-21 PROCEDURE — 36415 COLL VENOUS BLD VENIPUNCTURE: CPT

## 2017-01-21 RX ORDER — ACETAMINOPHEN 500 MG
1000 TABLET ORAL EVERY 6 HOURS PRN
Qty: 30 TABLET | Refills: 3 | Status: SHIPPED | OUTPATIENT
Start: 2017-01-21 | End: 2019-11-05 | Stop reason: ALTCHOICE

## 2017-01-21 RX ADMIN — DOCUSATE SODIUM 100 MG: 100 CAPSULE, LIQUID FILLED ORAL at 08:54

## 2017-01-21 RX ADMIN — FERROUS SULFATE TAB 325 MG (65 MG ELEMENTAL FE) 325 MG: 325 (65 FE) TAB at 08:54

## 2017-03-08 ENCOUNTER — OFFICE VISIT (OUTPATIENT)
Dept: OBGYN | Age: 27
End: 2017-03-08

## 2017-03-08 VITALS
SYSTOLIC BLOOD PRESSURE: 100 MMHG | HEART RATE: 64 BPM | DIASTOLIC BLOOD PRESSURE: 62 MMHG | BODY MASS INDEX: 18.67 KG/M2 | WEIGHT: 94.8 LBS

## 2017-03-08 PROCEDURE — 99213 OFFICE O/P EST LOW 20 MIN: CPT | Performed by: OBSTETRICS & GYNECOLOGY

## 2017-03-14 ASSESSMENT — ENCOUNTER SYMPTOMS
VOMITING: 0
NAUSEA: 0
COUGH: 0
SHORTNESS OF BREATH: 0

## 2017-07-26 ENCOUNTER — OFFICE VISIT (OUTPATIENT)
Dept: OBGYN | Age: 27
End: 2017-07-26

## 2017-07-26 VITALS
WEIGHT: 93.8 LBS | DIASTOLIC BLOOD PRESSURE: 64 MMHG | HEART RATE: 84 BPM | BODY MASS INDEX: 18.47 KG/M2 | SYSTOLIC BLOOD PRESSURE: 102 MMHG

## 2017-07-26 DIAGNOSIS — N91.5 OLIGOMENORRHEA: Primary | ICD-10-CM

## 2017-07-26 DIAGNOSIS — N91.2 ABSENT MENSES: ICD-10-CM

## 2017-07-26 LAB
CONTROL: YES
PREGNANCY TEST URINE, POC: NORMAL

## 2017-07-26 PROCEDURE — 99213 OFFICE O/P EST LOW 20 MIN: CPT | Performed by: OBSTETRICS & GYNECOLOGY

## 2017-07-26 PROCEDURE — 81025 URINE PREGNANCY TEST: CPT | Performed by: OBSTETRICS & GYNECOLOGY

## 2017-07-26 ASSESSMENT — ENCOUNTER SYMPTOMS
VOMITING: 0
COUGH: 0
SHORTNESS OF BREATH: 0
NAUSEA: 0

## 2017-08-11 ENCOUNTER — INITIAL PRENATAL (OUTPATIENT)
Dept: OBGYN | Age: 27
End: 2017-08-11

## 2017-08-11 VITALS
HEART RATE: 76 BPM | DIASTOLIC BLOOD PRESSURE: 58 MMHG | SYSTOLIC BLOOD PRESSURE: 94 MMHG | WEIGHT: 96 LBS | BODY MASS INDEX: 18.91 KG/M2

## 2017-08-11 DIAGNOSIS — Z34.81 OTHER NORMAL PREGNANCY, NOT FIRST, FIRST TRIMESTER: Primary | ICD-10-CM

## 2017-08-11 DIAGNOSIS — Z34.81 OTHER NORMAL PREGNANCY, NOT FIRST, FIRST TRIMESTER: ICD-10-CM

## 2017-08-11 DIAGNOSIS — O34.219 PREVIOUS CESAREAN DELIVERY AFFECTING PREGNANCY: ICD-10-CM

## 2017-08-11 LAB
BACTERIA: NORMAL /HPF
BILIRUBIN URINE: NEGATIVE
BLOOD, URINE: NEGATIVE
CLARITY: CLEAR
COLOR: YELLOW
EPITHELIAL CELLS, UA: NORMAL /HPF
GLUCOSE URINE: NEGATIVE MG/DL
KETONES, URINE: NEGATIVE MG/DL
LEUKOCYTE ESTERASE, URINE: ABNORMAL
NITRITE, URINE: NEGATIVE
PH UA: 8 (ref 5–9)
PROTEIN UA: ABNORMAL MG/DL
RBC UA: NORMAL /HPF (ref 0–2)
SPECIFIC GRAVITY UA: 1.02 (ref 1–1.03)
UROBILINOGEN, URINE: 0.2 E.U./DL
WBC UA: NORMAL /HPF (ref 0–5)

## 2017-08-11 PROCEDURE — 0500F INITIAL PRENATAL CARE VISIT: CPT | Performed by: OBSTETRICS & GYNECOLOGY

## 2017-08-11 RX ORDER — ASCORBIC ACID, CHOLECALCIFEROL, .ALPHA.-TOCOPHEROL ACETATE, DL-, PYRIDOXINE HYDROCHLORIDE, FOLIC ACID, CYANOCOBALAMIN, BIOTIN, CALCIUM CARBONATE, FERROUS ASPARTO GLYCINATE, IRON, POTASSIUM IODIDE, MAGNESIUM OXIDE, DOCONEXENT AND LOWBUSH BLUEBERRY 60; 1000; 10; 26; 400; 13; 280; 80; 9; 9; 150; 25; 350; 25; 600 MG/1; [IU]/1; [IU]/1; MG/1; UG/1; UG/1; UG/1; MG/1; MG/1; MG/1; UG/1; MG/1; MG/1; MG/1; UG/1
1 CAPSULE, GELATIN COATED ORAL DAILY
Qty: 30 CAPSULE | Refills: 9 | Status: SHIPPED | OUTPATIENT
Start: 2017-08-11 | End: 2018-08-10

## 2017-08-11 RX ORDER — FERROUS SULFATE 325(65) MG
325 TABLET ORAL
Qty: 30 TABLET | Refills: 8 | Status: SHIPPED | OUTPATIENT
Start: 2017-08-11 | End: 2018-08-10

## 2017-08-11 RX ORDER — OMEPRAZOLE 20 MG/1
20 TABLET, DELAYED RELEASE ORAL DAILY
Qty: 30 TABLET | Refills: 3 | Status: SHIPPED | OUTPATIENT
Start: 2017-08-11 | End: 2017-10-27

## 2017-08-11 RX ORDER — RANITIDINE 150 MG/1
150 TABLET ORAL 2 TIMES DAILY
Qty: 60 TABLET | Refills: 8 | Status: SHIPPED | OUTPATIENT
Start: 2017-08-11 | End: 2018-08-10

## 2017-08-13 LAB
AMPHETAMINE SCREEN, URINE: NEGATIVE NG/ML
BARBITURATE SCREEN URINE: NEGATIVE NG/ML
BENZODIAZEPINE SCREEN, URINE: NEGATIVE NG/ML
CANNABINOID SCREEN URINE: NEGATIVE NG/ML
COCAINE METABOLITE SCREEN URINE: NEGATIVE NG/ML
CREATININE URINE: 107.4 MG/DL (ref 20–400)
Lab: NORMAL
MDMA URINE: NEGATIVE NG/ML
METHADONE SCREEN, URINE: NEGATIVE NG/ML
OPIATE SCREEN URINE: NEGATIVE NG/ML
OXYCODONE SCREEN URINE: NEGATIVE NG/ML
PHENCYCLIDINE SCREEN URINE: NEGATIVE NG/ML
PROPOXYPHENE SCREEN: NEGATIVE NG/ML
URINE CULTURE, ROUTINE: NORMAL

## 2017-08-14 DIAGNOSIS — Z34.81 OTHER NORMAL PREGNANCY, NOT FIRST, FIRST TRIMESTER: ICD-10-CM

## 2017-08-14 DIAGNOSIS — N91.5 OLIGOMENORRHEA: ICD-10-CM

## 2017-08-14 LAB
ABO/RH: NORMAL
ANTIBODY SCREEN: NORMAL
BASOPHILS ABSOLUTE: 0.1 K/UL (ref 0–0.2)
BASOPHILS RELATIVE PERCENT: 0.7 %
EOSINOPHILS ABSOLUTE: 0.1 K/UL (ref 0–0.7)
EOSINOPHILS RELATIVE PERCENT: 1.7 %
GONADOTROPIN, CHORIONIC (HCG) QUANT: NORMAL MIU/ML
HCT VFR BLD CALC: 39.1 % (ref 37–47)
HEMOGLOBIN: 13 G/DL (ref 12–16)
HEPATITIS B SURFACE ANTIGEN INTERPRETATION: NORMAL
LYMPHOCYTES ABSOLUTE: 3.3 K/UL (ref 1–4.8)
LYMPHOCYTES RELATIVE PERCENT: 42.3 %
MCH RBC QN AUTO: 31.6 PG (ref 27–31.3)
MCHC RBC AUTO-ENTMCNC: 33.2 % (ref 33–37)
MCV RBC AUTO: 95 FL (ref 82–100)
MONOCYTES ABSOLUTE: 0.6 K/UL (ref 0.2–0.8)
MONOCYTES RELATIVE PERCENT: 7.4 %
NEUTROPHILS ABSOLUTE: 3.7 K/UL (ref 1.4–6.5)
NEUTROPHILS RELATIVE PERCENT: 47.9 %
PDW BLD-RTO: 14.2 % (ref 11.5–14.5)
PLATELET # BLD: 238 K/UL (ref 130–400)
RBC # BLD: 4.11 M/UL (ref 4.2–5.4)
RUBELLA ANTIBODY IGG: 45.5 IU/ML
WBC # BLD: 7.7 K/UL (ref 4.8–10.8)

## 2017-08-15 LAB
C. TRACHOMATIS DNA ,URINE: POSITIVE
HEMOGLOBIN A-1 QUANTITATION: 96.8 % (ref 95–97.9)
HEMOGLOBIN A2 QUANTITATION: 2.6 % (ref 2–3.5)
HEMOGLOBIN C QUANTITATION: 0 % (ref 0–0)
HEMOGLOBIN E QUANTITATION: 0 % (ref 0–0)
HEMOGLOBIN ELECTROPHORESIS: NORMAL
HEMOGLOBIN EVALUATION: NORMAL
HEMOGLOBIN F QUANTITATION: 0.6 % (ref 0–2.1)
HEMOGLOBIN OTHER: 0 % (ref 0–0)
HEMOGLOBIN S QUANTITATION: 0 % (ref 0–0)
N. GONORRHOEAE DNA, URINE: NEGATIVE
RPR: NORMAL
SICKLE CELL: NORMAL

## 2017-08-15 RX ORDER — AZITHROMYCIN 500 MG/1
1000 TABLET, FILM COATED ORAL ONCE
Qty: 2 TABLET | Refills: 0 | Status: SHIPPED | OUTPATIENT
Start: 2017-08-15 | End: 2017-08-15

## 2017-08-16 LAB
HIV-1 AND HIV-2 ANTIBODIES: NEGATIVE
VZV IGG SER QL IA: 238 IV

## 2017-08-22 LAB
EER NON INVASIVE PRENATAL ANEUPLOIDY: NORMAL
FETAL FRACTION: 4.2
FETAL GENDER: NORMAL
GESTATIONAL AGE (DAYS): 2
GESTATIONAL AGE(WEEKS): 11
Lab: NORMAL
MATERNAL WEIGHT: 96
MONOSOMY X: NORMAL
REPORT FETUS GENDER: YES
TRIPLOIDY (VANISHING TWIN): NORMAL
TRISOMY 13 RISK: NORMAL
TRISOMY 18 RISK ASSESSMENT: NORMAL
TRISOMY 21 RISK: NORMAL

## 2017-08-25 ENCOUNTER — ROUTINE PRENATAL (OUTPATIENT)
Dept: OBGYN | Age: 27
End: 2017-08-25

## 2017-08-25 VITALS
WEIGHT: 95.6 LBS | BODY MASS INDEX: 18.83 KG/M2 | DIASTOLIC BLOOD PRESSURE: 66 MMHG | SYSTOLIC BLOOD PRESSURE: 100 MMHG | HEART RATE: 68 BPM

## 2017-08-25 DIAGNOSIS — Z34.81 ENCOUNTER FOR SUPERVISION OF OTHER NORMAL PREGNANCY, FIRST TRIMESTER: Primary | ICD-10-CM

## 2017-08-25 DIAGNOSIS — O34.219 PREVIOUS CESAREAN DELIVERY AFFECTING PREGNANCY: ICD-10-CM

## 2017-08-25 LAB
BILIRUBIN, POC: NORMAL
BLOOD URINE, POC: NORMAL
CLARITY, POC: CLEAR
COLOR, POC: YELLOW
GLUCOSE URINE, POC: NORMAL
KETONES, POC: NORMAL
LEUKOCYTE EST, POC: NORMAL
NITRITE, POC: NORMAL
PH, POC: 6
PROTEIN, POC: NORMAL
SPECIFIC GRAVITY, POC: 1.03
UROBILINOGEN, POC: NORMAL

## 2017-08-25 PROCEDURE — 99213 OFFICE O/P EST LOW 20 MIN: CPT | Performed by: OBSTETRICS & GYNECOLOGY

## 2017-08-25 RX ORDER — AZITHROMYCIN 500 MG/1
1000 TABLET, FILM COATED ORAL ONCE
Qty: 2 TABLET | Refills: 0 | Status: SHIPPED | OUTPATIENT
Start: 2017-08-25 | End: 2017-08-25

## 2017-08-31 ENCOUNTER — HOSPITAL ENCOUNTER (OUTPATIENT)
Dept: ULTRASOUND IMAGING | Age: 27
Discharge: HOME | End: 2017-08-31
Payer: COMMERCIAL

## 2017-08-31 ENCOUNTER — HOSPITAL ENCOUNTER (EMERGENCY)
Age: 27
Discharge: HOME | End: 2017-08-31
Payer: COMMERCIAL

## 2017-08-31 VITALS
DIASTOLIC BLOOD PRESSURE: 86 MMHG | HEART RATE: 77 BPM | SYSTOLIC BLOOD PRESSURE: 117 MMHG | RESPIRATION RATE: 20 BRPM | TEMPERATURE: 98 F | OXYGEN SATURATION: 100 %

## 2017-08-31 DIAGNOSIS — J06.9 VIRAL UPPER RESPIRATORY ILLNESS: Primary | ICD-10-CM

## 2017-08-31 DIAGNOSIS — Z34.81 OTHER NORMAL PREGNANCY, NOT FIRST, FIRST TRIMESTER: ICD-10-CM

## 2017-08-31 PROCEDURE — 99282 EMERGENCY DEPT VISIT SF MDM: CPT

## 2017-08-31 PROCEDURE — 76801 OB US < 14 WKS SINGLE FETUS: CPT

## 2017-08-31 RX ORDER — DIPHENHYDRAMINE HCL 25 MG
25 CAPSULE ORAL EVERY 4 HOURS PRN
Qty: 1 CAPSULE | Refills: 0 | Status: SHIPPED | OUTPATIENT
Start: 2017-08-31 | End: 2017-09-10

## 2017-08-31 ASSESSMENT — PAIN DESCRIPTION - LOCATION: LOCATION: HEAD

## 2017-08-31 ASSESSMENT — ENCOUNTER SYMPTOMS
ANAL BLEEDING: 0
EYE PAIN: 0
SHORTNESS OF BREATH: 0
APNEA: 0
VOICE CHANGE: 0
PHOTOPHOBIA: 0
ABDOMINAL PAIN: 0
NAUSEA: 0
VOMITING: 0
COUGH: 1
ABDOMINAL DISTENTION: 0
EYE DISCHARGE: 0

## 2017-08-31 ASSESSMENT — PAIN DESCRIPTION - PAIN TYPE: TYPE: ACUTE PAIN

## 2017-08-31 ASSESSMENT — PAIN SCALES - GENERAL: PAINLEVEL_OUTOF10: 7

## 2017-09-02 LAB
Lab: NORMAL
REPORT: NORMAL
THIS TEST SENT TO: NORMAL

## 2017-09-25 ENCOUNTER — ROUTINE PRENATAL (OUTPATIENT)
Dept: OBGYN | Age: 27
End: 2017-09-25

## 2017-09-25 VITALS
WEIGHT: 97.6 LBS | DIASTOLIC BLOOD PRESSURE: 64 MMHG | SYSTOLIC BLOOD PRESSURE: 108 MMHG | BODY MASS INDEX: 19.22 KG/M2 | HEART RATE: 84 BPM

## 2017-09-25 DIAGNOSIS — O98.819 CHLAMYDIA INFECTION AFFECTING PREGNANCY: ICD-10-CM

## 2017-09-25 DIAGNOSIS — A74.9 CHLAMYDIA INFECTION AFFECTING PREGNANCY: ICD-10-CM

## 2017-09-25 DIAGNOSIS — O34.219 PREVIOUS CESAREAN DELIVERY AFFECTING PREGNANCY: ICD-10-CM

## 2017-09-25 DIAGNOSIS — Z34.82 ENCOUNTER FOR SUPERVISION OF OTHER NORMAL PREGNANCY, SECOND TRIMESTER: Primary | ICD-10-CM

## 2017-09-25 DIAGNOSIS — Z34.82 ENCOUNTER FOR SUPERVISION OF OTHER NORMAL PREGNANCY, SECOND TRIMESTER: ICD-10-CM

## 2017-09-25 LAB
BILIRUBIN, POC: NORMAL
BLOOD URINE, POC: NORMAL
CLARITY, POC: NORMAL
COLOR, POC: YELLOW
GLUCOSE URINE, POC: NORMAL
KETONES, POC: NORMAL
LEUKOCYTE EST, POC: NORMAL
NITRITE, POC: NORMAL
PH, POC: 7.5
PROTEIN, POC: NORMAL
SPECIFIC GRAVITY, POC: 1.01
UROBILINOGEN, POC: NORMAL

## 2017-09-25 PROCEDURE — 99213 OFFICE O/P EST LOW 20 MIN: CPT | Performed by: OBSTETRICS & GYNECOLOGY

## 2017-09-26 NOTE — TELEPHONE ENCOUNTER
Pt is calling today stating that her medication was not sent to her pharmacy. She states she asked for a refill at her appt yesterday.

## 2017-09-27 LAB
C. TRACHOMATIS DNA ,URINE: NEGATIVE
N. GONORRHOEAE DNA, URINE: NEGATIVE

## 2017-09-27 RX ORDER — BUTALBITAL, ACETAMINOPHEN AND CAFFEINE 50; 325; 40 MG/1; MG/1; MG/1
1-2 TABLET ORAL EVERY 6 HOURS PRN
Qty: 30 TABLET | Refills: 0 | Status: SHIPPED | OUTPATIENT
Start: 2017-09-27 | End: 2017-10-27 | Stop reason: SDUPTHER

## 2017-10-03 RX ORDER — BUTALBITAL, ACETAMINOPHEN AND CAFFEINE 300; 40; 50 MG/1; MG/1; MG/1
CAPSULE ORAL
Qty: 20 CAPSULE | Refills: 0 | OUTPATIENT
Start: 2017-10-03

## 2017-10-17 ENCOUNTER — HOSPITAL ENCOUNTER (OUTPATIENT)
Dept: ULTRASOUND IMAGING | Age: 27
Discharge: HOME | End: 2017-10-17
Payer: COMMERCIAL

## 2017-10-17 DIAGNOSIS — Z34.82 ENCOUNTER FOR SUPERVISION OF OTHER NORMAL PREGNANCY, SECOND TRIMESTER: ICD-10-CM

## 2017-10-17 PROCEDURE — 76805 OB US >/= 14 WKS SNGL FETUS: CPT

## 2017-10-27 ENCOUNTER — ROUTINE PRENATAL (OUTPATIENT)
Dept: OBGYN | Age: 27
End: 2017-10-27

## 2017-10-27 VITALS
WEIGHT: 98 LBS | SYSTOLIC BLOOD PRESSURE: 88 MMHG | DIASTOLIC BLOOD PRESSURE: 58 MMHG | HEART RATE: 76 BPM | BODY MASS INDEX: 19.3 KG/M2

## 2017-10-27 DIAGNOSIS — O34.219 PREVIOUS CESAREAN DELIVERY AFFECTING PREGNANCY: ICD-10-CM

## 2017-10-27 DIAGNOSIS — O98.811 CHLAMYDIA INFECTION AFFECTING PREGNANCY IN FIRST TRIMESTER: ICD-10-CM

## 2017-10-27 DIAGNOSIS — Z34.82 ENCOUNTER FOR SUPERVISION OF OTHER NORMAL PREGNANCY, SECOND TRIMESTER: Primary | ICD-10-CM

## 2017-10-27 DIAGNOSIS — A74.9 CHLAMYDIA INFECTION AFFECTING PREGNANCY IN FIRST TRIMESTER: ICD-10-CM

## 2017-10-27 LAB
BILIRUBIN, POC: ABNORMAL
BLOOD URINE, POC: ABNORMAL
CLARITY, POC: CLEAR
COLOR, POC: YELLOW
GLUCOSE URINE, POC: ABNORMAL
KETONES, POC: ABNORMAL
LEUKOCYTE EST, POC: ABNORMAL
NITRITE, POC: ABNORMAL
PH, POC: 6
PROTEIN, POC: ABNORMAL
SPECIFIC GRAVITY, POC: 1.02
UROBILINOGEN, POC: ABNORMAL

## 2017-10-27 PROCEDURE — 99213 OFFICE O/P EST LOW 20 MIN: CPT | Performed by: OBSTETRICS & GYNECOLOGY

## 2017-10-27 PROCEDURE — G8484 FLU IMMUNIZE NO ADMIN: HCPCS | Performed by: OBSTETRICS & GYNECOLOGY

## 2017-10-27 PROCEDURE — G8427 DOCREV CUR MEDS BY ELIG CLIN: HCPCS | Performed by: OBSTETRICS & GYNECOLOGY

## 2017-10-27 PROCEDURE — 4004F PT TOBACCO SCREEN RCVD TLK: CPT | Performed by: OBSTETRICS & GYNECOLOGY

## 2017-10-27 PROCEDURE — G8420 CALC BMI NORM PARAMETERS: HCPCS | Performed by: OBSTETRICS & GYNECOLOGY

## 2017-10-27 RX ORDER — BUTALBITAL, ACETAMINOPHEN AND CAFFEINE 50; 325; 40 MG/1; MG/1; MG/1
1-2 TABLET ORAL EVERY 6 HOURS PRN
Qty: 40 TABLET | Refills: 0 | Status: SHIPPED | OUTPATIENT
Start: 2017-10-27 | End: 2017-12-26 | Stop reason: SDUPTHER

## 2017-10-27 RX ORDER — OMEPRAZOLE 40 MG/1
40 CAPSULE, DELAYED RELEASE ORAL DAILY
Qty: 30 CAPSULE | Refills: 3 | Status: SHIPPED | OUTPATIENT
Start: 2017-10-27 | End: 2018-08-10 | Stop reason: ALTCHOICE

## 2017-10-27 NOTE — PROGRESS NOTES
OB visit      Peg Shaver is a 28y.o. year old female [id number] @ 19.3 wk , REY 3/20/2018 BY us 11 wk US not commensurate with LMP. POB: FTSVD @ 39 wks , girl Sj Brewster. Uncomplicated.  Emergent pLTCS @ 34 wks secondary to fetal distress/ IUGR, dwarfism/ left kidney failiure/ heart defects. SAB - unknown. Repeat LTCS , male , Sravani Biswas , 2017           PGYN: denies    PMH: denies    PSH: PC/S             Laparoscopy for ovarian cyst     MedS: denies    Allergic to amoxicillin - hives and throat swells                   Keflex - Hives    SOC hx : neg x 3    FH : grandfather - diabetes          Uncle mother side- diabetes. BP (!) 88/58   Pulse 76   Wt 98 lb (44.5 kg)   LMP 2017   BMI 19.30 kg/m²   Past Medical History:   Diagnosis Date    Anemia     Kidney stones     Unspecified asthma      Past Surgical History:   Procedure Laterality Date    OVARIAN CYST SURGERY      laparascopic sx    TONSILLECTOMY           Review of Systems  Constitutional: negative  Genitourinary:see above  Integument/breast: negative  Behavioral/Psych: negative  Endocrine: negative    All other systems reviewed and are negative. Physical Exam:  BP (!) 88/58   Pulse 76   Wt 98 lb (44.5 kg)   LMP 2017   BMI 19.30 kg/m²   Physical exam :   General Appearance: alert and oriented to person, place and time, well-developed and well-nourished, in no acute distress  Cardiovascular: normal rate, normal S1 and S2, no gallops, intact distal pulses and no carotid bruits  Abdomen: soft, non-tender, non-distended, normal bowel sounds, gravid uterus:    HOF : umbilicus    FHT : [id number] by BSUS    Assessment:     1. Encounter for supervision of other normal pregnancy, second trimester    2. Previous  delivery affecting pregnancy    3.  Chlamydia infection affecting pregnancy in first trimester        Plan:   Test of cure for chlamydia ordered today  Ultrasound for anatomy

## 2017-11-24 ENCOUNTER — ROUTINE PRENATAL (OUTPATIENT)
Dept: OBGYN | Age: 27
End: 2017-11-24

## 2017-11-24 VITALS
DIASTOLIC BLOOD PRESSURE: 62 MMHG | SYSTOLIC BLOOD PRESSURE: 100 MMHG | BODY MASS INDEX: 20.68 KG/M2 | WEIGHT: 105 LBS | HEART RATE: 84 BPM

## 2017-11-24 DIAGNOSIS — O34.219 PREVIOUS CESAREAN DELIVERY AFFECTING PREGNANCY: ICD-10-CM

## 2017-11-24 DIAGNOSIS — Z34.82 ENCOUNTER FOR SUPERVISION OF OTHER NORMAL PREGNANCY IN SECOND TRIMESTER: Primary | ICD-10-CM

## 2017-11-24 DIAGNOSIS — A74.9 CHLAMYDIA INFECTION AFFECTING PREGNANCY IN FIRST TRIMESTER: ICD-10-CM

## 2017-11-24 DIAGNOSIS — O98.811 CHLAMYDIA INFECTION AFFECTING PREGNANCY IN FIRST TRIMESTER: ICD-10-CM

## 2017-11-24 DIAGNOSIS — R00.2 HEART PALPITATIONS: ICD-10-CM

## 2017-11-24 DIAGNOSIS — M54.9 BACK PAIN AFFECTING PREGNANCY IN SECOND TRIMESTER: ICD-10-CM

## 2017-11-24 DIAGNOSIS — O99.891 BACK PAIN AFFECTING PREGNANCY IN SECOND TRIMESTER: ICD-10-CM

## 2017-11-24 DIAGNOSIS — G43.019 INTRACTABLE MIGRAINE WITHOUT AURA AND WITHOUT STATUS MIGRAINOSUS: ICD-10-CM

## 2017-11-24 DIAGNOSIS — Z3A.23 23 WEEKS GESTATION OF PREGNANCY: ICD-10-CM

## 2017-11-24 DIAGNOSIS — K52.9 CHRONIC DIARRHEA OF UNKNOWN ORIGIN: ICD-10-CM

## 2017-11-24 PROBLEM — Z34.90 SUPERVISION OF NORMAL PREGNANCY: Status: ACTIVE | Noted: 2017-11-24

## 2017-11-24 LAB
BILIRUBIN, POC: NORMAL
BLOOD URINE, POC: NORMAL
CLARITY, POC: CLEAR
COLOR, POC: YELLOW
GLUCOSE URINE, POC: NORMAL
KETONES, POC: NORMAL
LEUKOCYTE EST, POC: NORMAL
NITRITE, POC: NORMAL
PH, POC: 6.5
PROTEIN, POC: NORMAL
SPECIFIC GRAVITY, POC: 1.01
UROBILINOGEN, POC: NORMAL

## 2017-11-24 PROCEDURE — G8427 DOCREV CUR MEDS BY ELIG CLIN: HCPCS | Performed by: OBSTETRICS & GYNECOLOGY

## 2017-11-24 PROCEDURE — 4004F PT TOBACCO SCREEN RCVD TLK: CPT | Performed by: OBSTETRICS & GYNECOLOGY

## 2017-11-24 PROCEDURE — G8484 FLU IMMUNIZE NO ADMIN: HCPCS | Performed by: OBSTETRICS & GYNECOLOGY

## 2017-11-24 PROCEDURE — G8420 CALC BMI NORM PARAMETERS: HCPCS | Performed by: OBSTETRICS & GYNECOLOGY

## 2017-11-24 PROCEDURE — 99213 OFFICE O/P EST LOW 20 MIN: CPT | Performed by: OBSTETRICS & GYNECOLOGY

## 2017-11-24 RX ORDER — MAGNESIUM OXIDE 400 MG/1
400 TABLET ORAL EVERY MORNING
Qty: 30 TABLET | Refills: 1 | Status: SHIPPED | OUTPATIENT
Start: 2017-11-24 | End: 2017-12-26 | Stop reason: SDUPTHER

## 2017-11-24 RX ORDER — CYCLOBENZAPRINE HCL 10 MG
TABLET ORAL
Qty: 30 TABLET | Refills: 0 | Status: SHIPPED | OUTPATIENT
Start: 2017-11-24 | End: 2017-12-26 | Stop reason: SDUPTHER

## 2017-11-24 NOTE — PROGRESS NOTES
OB visit      Rodo Almonte is a 28y.o. year old female [id number] @ 23.3 wk , REY 3/20/2018 BY us 11 wk US not commensurate with LMP. POB: FTSVD @ 39 wks , girl Joesphine Niece. Uncomplicated.  Emergent pLTCS @ 34 wks secondary to fetal distress/ IUGR, dwarfism/ left kidney failiure/ heart defects. SAB - unknown. Repeat LTCS , male , Remus Pompa , 2017           PGYN: denies    PMH: denies    PSH: PC/S             Laparoscopy for ovarian cyst     MedS: denies    Allergic to amoxicillin - hives and throat swells                   Keflex - Hives    SOC hx : neg x 3    FH : grandfather - diabetes          Uncle mother side- diabetes. /62   Pulse 84   Wt 105 lb (47.6 kg)   LMP 2017   BMI 20.68 kg/m²   Past Medical History:   Diagnosis Date    Anemia     Intractable migraine without aura and without status migrainosus 2017    Kidney stones     Unspecified asthma(493.90)      Past Surgical History:   Procedure Laterality Date    OVARIAN CYST SURGERY      laparascopic sx    TONSILLECTOMY           Review of Systems  Constitutional: negative  Genitourinary:see above  Integument/breast: negative  Behavioral/Psych: negative  Endocrine: negative    All other systems reviewed and are negative. Physical Exam:  /62   Pulse 84   Wt 105 lb (47.6 kg)   LMP 2017   BMI 20.68 kg/m²   Physical exam :   General Appearance: alert and oriented to person, place and time, well-developed and well-nourished, in no acute distress  Cardiovascular: normal rate, normal S1 and S2, no gallops, intact distal pulses and no carotid bruits  Abdomen: soft, non-tender, non-distended, normal bowel sounds, gravid uterus:    HOF : umbilicus    FHT : [id number] by BSUS    Assessment:     1. Encounter for supervision of other normal pregnancy in second trimester    2. Intractable migraine without aura and without status migrainosus    3. Heart palpitations    4.  Chronic diarrhea of unknown origin    5. 23 weeks gestation of pregnancy    6. Previous  delivery affecting pregnancy    7. Chlamydia infection affecting pregnancy in first trimester    8. Back pain affecting pregnancy in second trimester        Plan:   Test of cure for chlamydia - negative   Ultrasound for anatomy - wnl . Patient encouraged to take prenatal vitamins  Magnesium oxide for migraines added   Flexeril for back pain   Referral for chronic diarrhea   ECG and cardiac enzymes for , fluttering sensation chest     Orders Placed This Encounter   Procedures    Troponin     Standing Status:   Future     Standing Expiration Date:   2018    CK     Standing Status:   Future     Standing Expiration Date:   2018    CK-Mb Index     Standing Status:   Future     Standing Expiration Date:   2018   Ros Leyva MD - [facility]     Referral Priority:   Routine     Referral Type:   Consult for Advice and Opinion     Referral Reason:   Specialty Services Required     Referred to Provider:   Prisca Betts MD     Requested Specialty:   Gastroenterology     Number of Visits Requested:   1    POCT Urinalysis No Micro (Auto)    EKG 12 lead     Standing Status:   Future     Standing Expiration Date:   2018     Order Specific Question:   Reason for Exam?     Answer:   Chest pain     Comments:   palpitation         Plan:   Ilan Cerda MD  Return in 3 weeks.      Follow-up in 4 weeks

## 2017-11-27 ENCOUNTER — HOSPITAL ENCOUNTER (OUTPATIENT)
Dept: NON INVASIVE DIAGNOSTICS | Age: 27
Discharge: HOME | End: 2017-11-27
Payer: COMMERCIAL

## 2017-11-27 DIAGNOSIS — R00.2 HEART PALPITATIONS: ICD-10-CM

## 2017-11-27 LAB
CK MB: 1.1 NG/ML (ref 0–3.8)
CREATINE KINASE-MB INDEX: 2.3 % (ref 0–3.5)
TOTAL CK: 48 U/L (ref 0–170)
TROPONIN: <0.01 NG/ML (ref 0–0.01)

## 2017-11-27 PROCEDURE — 93005 ELECTROCARDIOGRAM TRACING: CPT

## 2017-11-28 LAB
EKG ATRIAL RATE: 63 BPM
EKG P AXIS: 68 DEGREES
EKG P-R INTERVAL: 108 MS
EKG Q-T INTERVAL: 412 MS
EKG QRS DURATION: 92 MS
EKG QTC CALCULATION (BAZETT): 421 MS
EKG R AXIS: 62 DEGREES
EKG T AXIS: 29 DEGREES
EKG VENTRICULAR RATE: 63 BPM

## 2017-12-26 ENCOUNTER — ROUTINE PRENATAL (OUTPATIENT)
Dept: OBGYN | Age: 27
End: 2017-12-26

## 2017-12-26 VITALS
WEIGHT: 107 LBS | BODY MASS INDEX: 21.07 KG/M2 | DIASTOLIC BLOOD PRESSURE: 62 MMHG | SYSTOLIC BLOOD PRESSURE: 88 MMHG | HEART RATE: 84 BPM

## 2017-12-26 DIAGNOSIS — R00.2 HEART PALPITATIONS: ICD-10-CM

## 2017-12-26 DIAGNOSIS — O34.219 PREVIOUS CESAREAN DELIVERY AFFECTING PREGNANCY: ICD-10-CM

## 2017-12-26 DIAGNOSIS — O99.891 BACK PAIN AFFECTING PREGNANCY IN SECOND TRIMESTER: ICD-10-CM

## 2017-12-26 DIAGNOSIS — G43.019 INTRACTABLE MIGRAINE WITHOUT AURA AND WITHOUT STATUS MIGRAINOSUS: ICD-10-CM

## 2017-12-26 DIAGNOSIS — M54.9 BACK PAIN AFFECTING PREGNANCY IN SECOND TRIMESTER: ICD-10-CM

## 2017-12-26 DIAGNOSIS — Z34.82 ENCOUNTER FOR SUPERVISION OF OTHER NORMAL PREGNANCY IN SECOND TRIMESTER: Primary | ICD-10-CM

## 2017-12-26 LAB
BILIRUBIN, POC: NORMAL
BLOOD URINE, POC: NORMAL
CLARITY, POC: CLEAR
COLOR, POC: YELLOW
GLUCOSE URINE, POC: NORMAL
KETONES, POC: NORMAL
LEUKOCYTE EST, POC: NORMAL
NITRITE, POC: NORMAL
PH, POC: 6
PROTEIN, POC: NORMAL
SPECIFIC GRAVITY, POC: 1.02
UROBILINOGEN, POC: NORMAL

## 2017-12-26 PROCEDURE — 0502F SUBSEQUENT PRENATAL CARE: CPT | Performed by: OBSTETRICS & GYNECOLOGY

## 2017-12-26 RX ORDER — CYCLOBENZAPRINE HCL 10 MG
TABLET ORAL
Qty: 30 TABLET | Refills: 0 | Status: SHIPPED | OUTPATIENT
Start: 2017-12-26 | End: 2019-11-05 | Stop reason: ALTCHOICE

## 2017-12-26 RX ORDER — MAGNESIUM OXIDE 400 MG/1
400 TABLET ORAL EVERY MORNING
Qty: 30 TABLET | Refills: 1 | Status: SHIPPED | OUTPATIENT
Start: 2017-12-26 | End: 2018-08-10

## 2017-12-26 RX ORDER — BUTALBITAL, ACETAMINOPHEN AND CAFFEINE 50; 325; 40 MG/1; MG/1; MG/1
1-2 TABLET ORAL EVERY 6 HOURS PRN
Qty: 40 TABLET | Refills: 0 | Status: SHIPPED | OUTPATIENT
Start: 2017-12-26 | End: 2019-10-02 | Stop reason: ALTCHOICE

## 2017-12-26 NOTE — PROGRESS NOTES
delivery affecting pregnancy    5. Back pain affecting pregnancy in second trimester        Plan:   Test of cure for chlamydia - negative   Ultrasound for anatomy - wnl . Patient encouraged to take prenatal vitamins  Magnesium oxide for migraines added - helps \" take th eedge off\" , according to patient   Flexeril for back pain - improves symptoms. Referral for chronic diarrhea - pending . ECG and cardiac enzymes for , fluttering sensation chest - wnl . Orders Placed This Encounter   Procedures    Glucose Tolerance, 1 Hour     Standing Status:   Future     Standing Expiration Date:   2018    CBC With Auto Differential     Standing Status:   Future     Standing Expiration Date:   2018    POCT Urinalysis No Micro (Auto)         Plan:   Annetta Shone, MD  Return in 3 weeks.      Follow-up in 4 weeks

## 2018-01-04 ENCOUNTER — OFFICE VISIT (OUTPATIENT)
Dept: GASTROENTEROLOGY | Age: 28
End: 2018-01-04

## 2018-01-04 VITALS
DIASTOLIC BLOOD PRESSURE: 72 MMHG | WEIGHT: 110 LBS | HEIGHT: 60 IN | HEART RATE: 80 BPM | OXYGEN SATURATION: 99 % | SYSTOLIC BLOOD PRESSURE: 110 MMHG | RESPIRATION RATE: 17 BRPM | BODY MASS INDEX: 21.6 KG/M2

## 2018-01-04 DIAGNOSIS — K92.1 BLOOD IN STOOL: ICD-10-CM

## 2018-01-04 DIAGNOSIS — K59.1 FUNCTIONAL DIARRHEA: Primary | ICD-10-CM

## 2018-01-04 PROCEDURE — G8484 FLU IMMUNIZE NO ADMIN: HCPCS | Performed by: INTERNAL MEDICINE

## 2018-01-04 PROCEDURE — G8420 CALC BMI NORM PARAMETERS: HCPCS | Performed by: INTERNAL MEDICINE

## 2018-01-04 PROCEDURE — G8427 DOCREV CUR MEDS BY ELIG CLIN: HCPCS | Performed by: INTERNAL MEDICINE

## 2018-01-04 PROCEDURE — 99204 OFFICE O/P NEW MOD 45 MIN: CPT | Performed by: INTERNAL MEDICINE

## 2018-01-04 PROCEDURE — 4004F PT TOBACCO SCREEN RCVD TLK: CPT | Performed by: INTERNAL MEDICINE

## 2018-01-04 RX ORDER — WHEAT DEXTRIN 3 G/3.8 G
15 POWDER (GRAM) ORAL DAILY
Qty: 1 CAN | Refills: 0 | Status: SHIPPED | OUTPATIENT
Start: 2018-01-04 | End: 2019-11-07 | Stop reason: SDUPTHER

## 2018-01-04 RX ORDER — HYDROCORTISONE ACETATE 25 MG/1
25 SUPPOSITORY RECTAL EVERY 12 HOURS
Qty: 12 SUPPOSITORY | Refills: 3 | Status: ON HOLD | OUTPATIENT
Start: 2018-01-04 | End: 2019-12-11

## 2018-01-04 ASSESSMENT — ENCOUNTER SYMPTOMS
ANAL BLEEDING: 1
DIARRHEA: 1
VOMITING: 0
NAUSEA: 0
ABDOMINAL PAIN: 0
RECTAL PAIN: 0
ABDOMINAL DISTENTION: 0
BLOOD IN STOOL: 0
RESPIRATORY NEGATIVE: 1
CONSTIPATION: 0
EYES NEGATIVE: 1

## 2018-01-04 NOTE — PROGRESS NOTES
Gastroenterology Clinic Visit    Issa Mckay  [id number]  Chief Complaint   Patient presents with    Rectal Bleeding     blood in stool      HPI: 28 y.o. female presents to the clinic with had concerns including Rectal Bleeding (blood in stool ). Patient referred from [facility] with one-year history of diarrhea and history of bright red blood per rectum. Patient reports cushion of her history of IBS in the past.  Patient has been having diarrhea with 2-3 soft to watery bowel movements on a daily basis for at least the last year. She has noticed blood on wiping and in stool on multiple occasions. She denies any constipation, abdominal pain. She is known to be anemic. She is currently pregnant at the eight-month, due on March 20, 2018    Review of Systems   Constitutional: Negative for appetite change, chills, fatigue and unexpected weight change. HENT: Negative for mouth sores and postnasal drip. Eyes: Negative. Respiratory: Negative. Cardiovascular: Negative. Gastrointestinal: Positive for anal bleeding and diarrhea. Negative for abdominal distention, abdominal pain, blood in stool, constipation, nausea, rectal pain and vomiting. Endocrine: Negative. Genitourinary: Negative. Musculoskeletal: Negative. Skin: Negative. Neurological: Negative. Hematological: Negative. Psychiatric/Behavioral: Negative. Negative for agitation and sleep disturbance.         Past Medical History:   Diagnosis Date    Anemia     Intractable migraine without aura and without status migrainosus 11/24/2017    Kidney stones     Unspecified asthma(493.90)       Past Surgical History:   Procedure Laterality Date    OVARIAN CYST SURGERY      laparascopic sx    TONSILLECTOMY       Current Outpatient Prescriptions on File Prior to Visit   Medication Sig Dispense Refill    butalbital-acetaminophen-caffeine (FIORICET, ESGIC) -40 MG per tablet Take 1-2 tablets by mouth every 6 hours as needed

## 2018-01-08 ENCOUNTER — TELEPHONE (OUTPATIENT)
Dept: OBGYN | Age: 28
End: 2018-01-08

## 2018-01-08 RX ORDER — FLUCONAZOLE 150 MG/1
TABLET ORAL
Qty: 3 TABLET | Refills: 0 | Status: SHIPPED | OUTPATIENT
Start: 2018-01-08 | End: 2018-08-10

## 2018-01-16 ENCOUNTER — ROUTINE PRENATAL (OUTPATIENT)
Dept: OBGYN | Age: 28
End: 2018-01-16

## 2018-01-16 VITALS
DIASTOLIC BLOOD PRESSURE: 62 MMHG | SYSTOLIC BLOOD PRESSURE: 104 MMHG | WEIGHT: 111 LBS | HEART RATE: 84 BPM | BODY MASS INDEX: 22.04 KG/M2

## 2018-01-16 DIAGNOSIS — Z34.83 ENCOUNTER FOR SUPERVISION OF OTHER NORMAL PREGNANCY IN THIRD TRIMESTER: ICD-10-CM

## 2018-01-16 DIAGNOSIS — G43.019 INTRACTABLE MIGRAINE WITHOUT AURA AND WITHOUT STATUS MIGRAINOSUS: ICD-10-CM

## 2018-01-16 DIAGNOSIS — O99.891 BACK PAIN AFFECTING PREGNANCY IN SECOND TRIMESTER: ICD-10-CM

## 2018-01-16 DIAGNOSIS — M54.9 BACK PAIN AFFECTING PREGNANCY IN SECOND TRIMESTER: ICD-10-CM

## 2018-01-16 DIAGNOSIS — Z34.83 ENCOUNTER FOR SUPERVISION OF OTHER NORMAL PREGNANCY IN THIRD TRIMESTER: Primary | ICD-10-CM

## 2018-01-16 DIAGNOSIS — O34.219 PREVIOUS CESAREAN DELIVERY AFFECTING PREGNANCY: ICD-10-CM

## 2018-01-16 DIAGNOSIS — K92.1 BLOOD IN STOOL: ICD-10-CM

## 2018-01-16 DIAGNOSIS — Z34.82 ENCOUNTER FOR SUPERVISION OF OTHER NORMAL PREGNANCY IN SECOND TRIMESTER: ICD-10-CM

## 2018-01-16 DIAGNOSIS — R00.2 HEART PALPITATIONS: ICD-10-CM

## 2018-01-16 DIAGNOSIS — Z3A.31 31 WEEKS GESTATION OF PREGNANCY: ICD-10-CM

## 2018-01-16 LAB
BACTERIA: ABNORMAL /HPF
BASOPHILS ABSOLUTE: 0 K/UL (ref 0–0.2)
BASOPHILS RELATIVE PERCENT: 0.2 %
BILIRUBIN URINE: ABNORMAL
BILIRUBIN, POC: ABNORMAL
BLOOD URINE, POC: ABNORMAL
BLOOD, URINE: NEGATIVE
CLARITY, POC: CLEAR
CLARITY: ABNORMAL
COLOR, POC: ABNORMAL
COLOR: ABNORMAL
EOSINOPHILS ABSOLUTE: 0.1 K/UL (ref 0–0.7)
EOSINOPHILS RELATIVE PERCENT: 1.2 %
EPITHELIAL CELLS, UA: ABNORMAL /HPF
GLUCOSE URINE, POC: ABNORMAL
GLUCOSE URINE: 250 MG/DL
GLUCOSE, 1HR PP: 144 MG/DL (ref 60–140)
HCT VFR BLD CALC: 29.6 % (ref 37–47)
HEMOGLOBIN: 10 G/DL (ref 12–16)
KETONES, POC: ABNORMAL
KETONES, URINE: NEGATIVE MG/DL
LEUKOCYTE EST, POC: ABNORMAL
LEUKOCYTE ESTERASE, URINE: ABNORMAL
LYMPHOCYTES ABSOLUTE: 2.3 K/UL (ref 1–4.8)
LYMPHOCYTES RELATIVE PERCENT: 20.4 %
MCH RBC QN AUTO: 33.4 PG (ref 27–31.3)
MCHC RBC AUTO-ENTMCNC: 33.8 % (ref 33–37)
MCV RBC AUTO: 98.9 FL (ref 82–100)
MONOCYTES ABSOLUTE: 0.3 K/UL (ref 0.2–0.8)
MONOCYTES RELATIVE PERCENT: 2.9 %
NEUTROPHILS ABSOLUTE: 8.5 K/UL (ref 1.4–6.5)
NEUTROPHILS RELATIVE PERCENT: 75.3 %
NITRITE, POC: ABNORMAL
NITRITE, URINE: NEGATIVE
PDW BLD-RTO: 12.8 % (ref 11.5–14.5)
PH UA: 6 (ref 5–9)
PH, POC: 6
PLATELET # BLD: 206 K/UL (ref 130–400)
PROTEIN UA: NEGATIVE MG/DL
PROTEIN, POC: ABNORMAL
RBC # BLD: 2.99 M/UL (ref 4.2–5.4)
RBC UA: ABNORMAL /HPF (ref 0–2)
RENAL EPITHELIAL, UA: ABNORMAL /HPF
SPECIFIC GRAVITY UA: 1.02 (ref 1–1.03)
SPECIFIC GRAVITY, POC: 1.02
UROBILINOGEN, POC: ABNORMAL
UROBILINOGEN, URINE: 1 E.U./DL
WBC # BLD: 11.3 K/UL (ref 4.8–10.8)
WBC UA: ABNORMAL /HPF (ref 0–5)

## 2018-01-16 PROCEDURE — 0502F SUBSEQUENT PRENATAL CARE: CPT | Performed by: OBSTETRICS & GYNECOLOGY

## 2018-01-16 NOTE — PROGRESS NOTES
OB visit      Richard Rolle is a 28y.o. year old female [id number] @ 31 wk , REY 3/20/2018 BY us 11 wk US not commensurate with LMP. POB: FTSVD @ 39 wks , girl Firman Smiling. Uncomplicated.  Emergent pLTCS @ 34 wks secondary to fetal distress/ IUGR, dwarfism/ left kidney failiure/ heart defects. SAB - unknown. Repeat LTCS , male , Early Purple ,            PGYN: denies    PMH: denies    PSH: PC/S             Laparoscopy for ovarian cyst     MedS: denies    Allergic to amoxicillin - hives and throat swells                   Keflex - Hives    SOC hx : neg x 3    FH : grandfather - diabetes          Uncle mother side- diabetes. /62   Pulse 84   Wt 111 lb (50.3 kg)   LMP 2017   BMI 22.04 kg/m²   Past Medical History:   Diagnosis Date    Anemia     Intractable migraine without aura and without status migrainosus 2017    Kidney stones     Unspecified asthma(493.90)      Past Surgical History:   Procedure Laterality Date    OVARIAN CYST SURGERY      laparascopic sx    TONSILLECTOMY           Review of Systems  Constitutional: negative  Genitourinary:see above  Integument/breast: negative  Behavioral/Psych: negative  Endocrine: negative    All other systems reviewed and are negative. Physical Exam:  /62   Pulse 84   Wt 111 lb (50.3 kg)   LMP 2017   BMI 22.04 kg/m²   Physical exam :   General Appearance: alert and oriented to person, place and time, well-developed and well-nourished, in no acute distress  Cardiovascular: normal rate, normal S1 and S2, no gallops, intact distal pulses and no carotid bruits  Abdomen: soft, non-tender, non-distended, normal bowel sounds, gravid uterus:    HOF : 31 cm     FHT : 152bpm by BSUS    Assessment:     1. Encounter for supervision of other normal pregnancy in third trimester    2. 31 weeks gestation of pregnancy    3. Intractable migraine without aura and without status migrainosus    4. Heart palpitations    5. Previous  delivery affecting pregnancy    6. Back pain affecting pregnancy in second trimester        Plan:   Test of cure for chlamydia - negative   Ultrasound for anatomy - wnl . Patient encouraged to take prenatal vitamins  Magnesium oxide for migraines added - helps \" take th edge off\" , according to patient   Flexeril for back pain - improves symptoms. Referral for chronic diarrhea - pending . ECG and cardiac enzymes for , fluttering sensation chest - wnl . Orders Placed This Encounter   Procedures    Urine Culture     Standing Status:   Future     Standing Expiration Date:   2019     Order Specific Question:   Specify (ex-cath, midstream, cysto, etc)? Answer:   midstream    Urinalysis with Microscopic     Standing Status:   Future     Standing Expiration Date:   2019     Order Specific Question:   SPECIFY(EX-CATH,MIDSTREAM,CYSTO,ETC)? Answer:   midstream    POCT Urinalysis No Micro (Auto)         Plan:   George Barajas MD  Return in 2 weeks.

## 2018-01-18 DIAGNOSIS — K59.1 FUNCTIONAL DIARRHEA: ICD-10-CM

## 2018-01-18 LAB — URINE CULTURE, ROUTINE: NORMAL

## 2018-01-19 DIAGNOSIS — O99.810 ABNORMAL GLUCOSE TOLERANCE IN MOTHER COMPLICATING PREGNANCY: Primary | ICD-10-CM

## 2018-01-21 LAB — CALPROTECTIN: 26 UG/G

## 2018-01-26 DIAGNOSIS — O99.810 ABNORMAL GLUCOSE TOLERANCE IN MOTHER COMPLICATING PREGNANCY: ICD-10-CM

## 2018-01-30 ENCOUNTER — ROUTINE PRENATAL (OUTPATIENT)
Dept: OBGYN CLINIC | Age: 28
End: 2018-01-30

## 2018-01-30 VITALS
WEIGHT: 110 LBS | SYSTOLIC BLOOD PRESSURE: 86 MMHG | BODY MASS INDEX: 21.85 KG/M2 | DIASTOLIC BLOOD PRESSURE: 52 MMHG | HEART RATE: 76 BPM

## 2018-01-30 DIAGNOSIS — Z34.83 ENCOUNTER FOR SUPERVISION OF OTHER NORMAL PREGNANCY IN THIRD TRIMESTER: ICD-10-CM

## 2018-01-30 DIAGNOSIS — Z3A.33 33 WEEKS GESTATION OF PREGNANCY: Primary | ICD-10-CM

## 2018-01-30 PROCEDURE — 0502F SUBSEQUENT PRENATAL CARE: CPT | Performed by: OBSTETRICS & GYNECOLOGY

## 2018-01-30 NOTE — PROGRESS NOTES
anatomy - wnl . Patient encouraged to take prenatal vitamins  Magnesium oxide for migraines added - helps \" take th edge off\" , according to patient   Flexeril for back pain - improves symptoms. Referral for chronic diarrhea - pending . ECG and cardiac enzymes for , fluttering sensation chest - wnl . Orders Placed This Encounter   Procedures    POCT Urinalysis No Micro (Auto)         Plan:   Bienvenido Aviles MD  Return in 2 weeks.

## 2018-01-31 LAB
BILIRUBIN, POC: ABNORMAL
BLOOD URINE, POC: ABNORMAL
CLARITY, POC: ABNORMAL
COLOR, POC: YELLOW
GLUCOSE URINE, POC: ABNORMAL
KETONES, POC: ABNORMAL
LEUKOCYTE EST, POC: ABNORMAL
NITRITE, POC: ABNORMAL
PH, POC: 6
PROTEIN, POC: ABNORMAL
SPECIFIC GRAVITY, POC: 1.02
UROBILINOGEN, POC: ABNORMAL

## 2018-02-05 ENCOUNTER — NURSE ONLY (OUTPATIENT)
Dept: OBGYN CLINIC | Age: 28
End: 2018-02-05
Payer: COMMERCIAL

## 2018-02-05 DIAGNOSIS — O26.13 LOW MATERNAL WEIGHT GAIN, THIRD TRIMESTER: ICD-10-CM

## 2018-02-05 DIAGNOSIS — O99.810 ABNORMAL GLUCOSE TOLERANCE IN MOTHER COMPLICATING PREGNANCY: Primary | ICD-10-CM

## 2018-02-05 DIAGNOSIS — O26.843 FUNDAL HEIGHT LOW FOR DATES IN THIRD TRIMESTER: ICD-10-CM

## 2018-02-05 DIAGNOSIS — O34.219 PREVIOUS CESAREAN DELIVERY AFFECTING PREGNANCY: ICD-10-CM

## 2018-02-05 PROCEDURE — 76815 OB US LIMITED FETUS(S): CPT | Performed by: OBSTETRICS & GYNECOLOGY

## 2018-02-05 PROCEDURE — 99999 PR OFFICE/OUTPT VISIT,PROCEDURE ONLY: CPT | Performed by: OBSTETRICS & GYNECOLOGY

## 2018-02-07 ENCOUNTER — HOSPITAL ENCOUNTER (OUTPATIENT)
Age: 28
Discharge: HOME | End: 2018-02-07
Attending: OBSTETRICS & GYNECOLOGY | Admitting: OBSTETRICS & GYNECOLOGY
Payer: COMMERCIAL

## 2018-02-07 ENCOUNTER — TELEPHONE (OUTPATIENT)
Dept: OBGYN CLINIC | Age: 28
End: 2018-02-07

## 2018-02-07 ENCOUNTER — APPOINTMENT (OUTPATIENT)
Dept: ULTRASOUND IMAGING | Age: 28
End: 2018-02-07
Payer: COMMERCIAL

## 2018-02-07 VITALS
HEART RATE: 70 BPM | DIASTOLIC BLOOD PRESSURE: 67 MMHG | RESPIRATION RATE: 20 BRPM | TEMPERATURE: 98.1 F | SYSTOLIC BLOOD PRESSURE: 108 MMHG

## 2018-02-07 LAB
AMPHETAMINE SCREEN, URINE: NORMAL
BACTERIA: ABNORMAL /HPF
BARBITURATE SCREEN URINE: NORMAL
BENZODIAZEPINE SCREEN, URINE: NORMAL
BILIRUBIN URINE: NEGATIVE
BLOOD, URINE: NEGATIVE
CANNABINOID SCREEN URINE: NORMAL
CLARITY: ABNORMAL
CLUE CELLS: NORMAL
COCAINE METABOLITE SCREEN URINE: NORMAL
COLOR: YELLOW
EPITHELIAL CELLS, UA: ABNORMAL /HPF
FETAL FIBRONECTIN: POSITIVE
GLUCOSE URINE: NEGATIVE MG/DL
KETONES, URINE: NEGATIVE MG/DL
LEUKOCYTE ESTERASE, URINE: ABNORMAL
Lab: NORMAL
NITRITE, URINE: NEGATIVE
OPIATE SCREEN URINE: NORMAL
PH UA: 7 (ref 5–9)
PHENCYCLIDINE SCREEN URINE: NORMAL
PROTEIN UA: NEGATIVE MG/DL
RBC UA: ABNORMAL /HPF (ref 0–2)
SPECIFIC GRAVITY UA: 1.02 (ref 1–1.03)
TRICHOMONAS PREP: NORMAL
TRICHOMONAS VAGINALIS SCREEN: NEGATIVE
UROBILINOGEN, URINE: 1 E.U./DL
WBC UA: ABNORMAL /HPF (ref 0–5)
YEAST WET PREP: NORMAL

## 2018-02-07 PROCEDURE — 99283 EMERGENCY DEPT VISIT LOW MDM: CPT | Performed by: OBSTETRICS & GYNECOLOGY

## 2018-02-07 PROCEDURE — 80307 DRUG TEST PRSMV CHEM ANLYZR: CPT

## 2018-02-07 PROCEDURE — 82731 ASSAY OF FETAL FIBRONECTIN: CPT

## 2018-02-07 PROCEDURE — 87070 CULTURE OTHR SPECIMN AEROBIC: CPT

## 2018-02-07 PROCEDURE — 87491 CHLMYD TRACH DNA AMP PROBE: CPT

## 2018-02-07 PROCEDURE — 59025 FETAL NON-STRESS TEST: CPT

## 2018-02-07 PROCEDURE — 99284 EMERGENCY DEPT VISIT MOD MDM: CPT

## 2018-02-07 PROCEDURE — 76819 FETAL BIOPHYS PROFIL W/O NST: CPT

## 2018-02-07 PROCEDURE — 81001 URINALYSIS AUTO W/SCOPE: CPT

## 2018-02-07 PROCEDURE — 87086 URINE CULTURE/COLONY COUNT: CPT

## 2018-02-07 PROCEDURE — 87210 SMEAR WET MOUNT SALINE/INK: CPT

## 2018-02-07 PROCEDURE — 87660 TRICHOMONAS VAGIN DIR PROBE: CPT

## 2018-02-07 PROCEDURE — 76817 TRANSVAGINAL US OBSTETRIC: CPT

## 2018-02-07 PROCEDURE — 59025 FETAL NON-STRESS TEST: CPT | Performed by: OBSTETRICS & GYNECOLOGY

## 2018-02-07 PROCEDURE — 87591 N.GONORRHOEAE DNA AMP PROB: CPT

## 2018-02-07 RX ORDER — ACETAMINOPHEN 325 MG/1
650 TABLET ORAL EVERY 4 HOURS PRN
Status: DISCONTINUED | OUTPATIENT
Start: 2018-02-07 | End: 2018-02-07 | Stop reason: HOSPADM

## 2018-02-07 NOTE — FLOWSHEET NOTE
Patient into triage for complaint of feeling a pop and has had\" watery discharge\" since . The watery discharge is on and off. Feeling a lot of pressure when walking. Pain level is \"5\",\" tolerable, I can block it out. \"  Haroon Lancaster sure obtained.

## 2018-02-07 NOTE — ED PROVIDER NOTES
).    Vivian Molina MD     Patient results reviewed and patient was sent home with pelvic rest  And to follow up with Dr Joslyn Vu next week.  No contractions are noted cervix is closed at 2.5cm urine sent for culture

## 2018-02-09 ENCOUNTER — NURSE ONLY (OUTPATIENT)
Dept: OBGYN CLINIC | Age: 28
End: 2018-02-09
Payer: COMMERCIAL

## 2018-02-09 DIAGNOSIS — O26.842 FUNDAL HEIGHT LOW FOR DATES IN SECOND TRIMESTER: ICD-10-CM

## 2018-02-09 DIAGNOSIS — O34.219 PREVIOUS CESAREAN DELIVERY AFFECTING PREGNANCY: Primary | ICD-10-CM

## 2018-02-09 LAB — URINE CULTURE, ROUTINE: NORMAL

## 2018-02-09 PROCEDURE — 59025 FETAL NON-STRESS TEST: CPT | Performed by: OBSTETRICS & GYNECOLOGY

## 2018-02-09 PROCEDURE — 99999 PR OFFICE/OUTPT VISIT,PROCEDURE ONLY: CPT | Performed by: OBSTETRICS & GYNECOLOGY

## 2018-02-09 NOTE — PROGRESS NOTES
Nonstress Test        SUBJECTIVE:  The patient is a 28 y.o. female 34w3d. OB History      Para Term  AB Living    5 3 2 1 1 3    SAB TAB Ectopic Molar Multiple Live Births    1 0 0   0 3        Patient presents for NST fetus small for gestational age.      Prenatal record from office was reviewed    OBJECTIVE    Vitals:  LMP 2017     Fetal heart rate:         Baseline Heart Rate:  140 bpm        Accelerations:  present       Decelerations:  absent       Variability:  moderate    Contraction frequency: 0 minutes        ASSESSMENT & PLAN:      A:  1) IUP at 34.2 weeks        2) reactive    P: D/C home next scheduled appointment and NST

## 2018-02-10 LAB — GENITAL CULTURE, ROUTINE: NORMAL

## 2018-02-12 ENCOUNTER — ROUTINE PRENATAL (OUTPATIENT)
Dept: OBGYN CLINIC | Age: 28
End: 2018-02-12

## 2018-02-12 VITALS
SYSTOLIC BLOOD PRESSURE: 88 MMHG | BODY MASS INDEX: 22.04 KG/M2 | HEART RATE: 76 BPM | WEIGHT: 111 LBS | DIASTOLIC BLOOD PRESSURE: 62 MMHG

## 2018-02-12 DIAGNOSIS — Z34.83 ENCOUNTER FOR SUPERVISION OF OTHER NORMAL PREGNANCY IN THIRD TRIMESTER: Primary | ICD-10-CM

## 2018-02-12 DIAGNOSIS — G43.019 INTRACTABLE MIGRAINE WITHOUT AURA AND WITHOUT STATUS MIGRAINOSUS: ICD-10-CM

## 2018-02-12 DIAGNOSIS — O40.3XX0 POLYHYDRAMNIOS AFFECTING PREGNANCY IN THIRD TRIMESTER: ICD-10-CM

## 2018-02-12 DIAGNOSIS — O34.219 PREVIOUS CESAREAN DELIVERY AFFECTING PREGNANCY: ICD-10-CM

## 2018-02-12 DIAGNOSIS — Z34.83 ENCOUNTER FOR SUPERVISION OF OTHER NORMAL PREGNANCY IN THIRD TRIMESTER: ICD-10-CM

## 2018-02-12 DIAGNOSIS — O99.810 ABNORMAL GLUCOSE TOLERANCE IN MOTHER COMPLICATING PREGNANCY: ICD-10-CM

## 2018-02-12 LAB
BACTERIA: NORMAL /HPF
BILIRUBIN URINE: NEGATIVE
BILIRUBIN, POC: ABNORMAL
BLOOD URINE, POC: ABNORMAL
BLOOD, URINE: NEGATIVE
C TRACH DNA GENITAL QL NAA+PROBE: NEGATIVE
CLARITY, POC: CLEAR
CLARITY: CLEAR
COLOR, POC: YELLOW
COLOR: ABNORMAL
EPITHELIAL CELLS, UA: NORMAL /HPF
GLUCOSE URINE, POC: ABNORMAL
GLUCOSE URINE: NEGATIVE MG/DL
KETONES, POC: ABNORMAL
KETONES, URINE: NEGATIVE MG/DL
LEUKOCYTE EST, POC: ABNORMAL
LEUKOCYTE ESTERASE, URINE: ABNORMAL
MUCUS: PRESENT
N. GONORRHOEAE DNA: NEGATIVE
NITRITE, POC: ABNORMAL
NITRITE, URINE: NEGATIVE
PH UA: 6.5 (ref 5–9)
PH, POC: 6
PROTEIN UA: NEGATIVE MG/DL
PROTEIN, POC: ABNORMAL
RBC UA: NORMAL /HPF (ref 0–2)
SPECIFIC GRAVITY UA: 1.02 (ref 1–1.03)
SPECIFIC GRAVITY, POC: 1.02
UROBILINOGEN, POC: ABNORMAL
UROBILINOGEN, URINE: 1 E.U./DL
WBC UA: NORMAL /HPF (ref 0–5)

## 2018-02-12 PROCEDURE — 0502F SUBSEQUENT PRENATAL CARE: CPT | Performed by: OBSTETRICS & GYNECOLOGY

## 2018-02-12 NOTE — PROGRESS NOTES
OB visit      Misael Gilman is a 28y.o. year old female [id number] @ 34.6 wk , REY 3/20/2018 BY us 11 wk US not commensurate with LMP. POB: FTSVD @ 39 wks , girl Evens Philip. Uncomplicated.  Emergent pLTCS @ 34 wks secondary to fetal distress/ IUGR, dwarfism/ left kidney failiure/ heart defects. SAB - unknown. Repeat LTCS , male , Sirena Pete ,            PGYN: denies    PMH: denies    PSH: PC/S             Laparoscopy for ovarian cyst     MedS: denies    Allergic to amoxicillin - hives and throat swells                   Keflex - Hives    SOC hx : neg x 3    FH : grandfather - diabetes          Uncle mother side- diabetes. BP 88/62   Pulse 76   Wt 111 lb (50.3 kg)   LMP 2017   BMI 22.04 kg/m²   Past Medical History:   Diagnosis Date    Anemia     Intractable migraine without aura and without status migrainosus 2017    Kidney stones     Unspecified asthma(493.90)      Past Surgical History:   Procedure Laterality Date    OVARIAN CYST SURGERY      laparascopic sx    TONSILLECTOMY           Review of Systems  Constitutional: negative  Genitourinary:see above  Integument/breast: negative  Behavioral/Psych: negative  Endocrine: negative    All other systems reviewed and are negative. Physical Exam:  BP 88/62   Pulse 76   Wt 111 lb (50.3 kg)   LMP 2017   BMI 22.04 kg/m²   Physical exam :   General Appearance: alert and oriented to person, place and time, well-developed and well-nourished, in no acute distress  Cardiovascular: normal rate, normal S1 and S2, no gallops, intact distal pulses and no carotid bruits  Abdomen: soft, non-tender, non-distended, normal bowel sounds, gravid uterus:    HOF : 35 cm     FHT : 152bpm by BSUS    Assessment:     1. Encounter for supervision of other normal pregnancy in third trimester    2. Previous  delivery affecting pregnancy    3.  Intractable migraine without aura and without status

## 2018-02-13 PROBLEM — O26.13 LOW MATERNAL WEIGHT GAIN, THIRD TRIMESTER: Status: ACTIVE | Noted: 2018-02-13

## 2018-02-13 PROBLEM — O26.843 FUNDAL HEIGHT LOW FOR DATES IN THIRD TRIMESTER: Status: ACTIVE | Noted: 2018-02-13

## 2018-02-14 LAB — URINE CULTURE, ROUTINE: NORMAL

## 2018-02-16 ENCOUNTER — NURSE ONLY (OUTPATIENT)
Dept: OBGYN CLINIC | Age: 28
End: 2018-02-16
Payer: COMMERCIAL

## 2018-02-16 DIAGNOSIS — O40.3XX0 POLYHYDRAMNIOS AFFECTING PREGNANCY IN THIRD TRIMESTER: ICD-10-CM

## 2018-02-16 DIAGNOSIS — O99.810 ABNORMAL GLUCOSE TOLERANCE IN MOTHER COMPLICATING PREGNANCY: Primary | ICD-10-CM

## 2018-02-16 DIAGNOSIS — O34.219 PREVIOUS CESAREAN DELIVERY AFFECTING PREGNANCY: ICD-10-CM

## 2018-02-16 PROCEDURE — 99999 PR OFFICE/OUTPT VISIT,PROCEDURE ONLY: CPT | Performed by: OBSTETRICS & GYNECOLOGY

## 2018-02-16 PROCEDURE — 59025 FETAL NON-STRESS TEST: CPT | Performed by: OBSTETRICS & GYNECOLOGY

## 2018-02-19 ENCOUNTER — HOSPITAL ENCOUNTER (OUTPATIENT)
Dept: ULTRASOUND IMAGING | Age: 28
Discharge: HOME | End: 2018-02-21
Payer: COMMERCIAL

## 2018-02-19 DIAGNOSIS — O26.843 FUNDAL HEIGHT LOW FOR DATES IN THIRD TRIMESTER: ICD-10-CM

## 2018-02-19 DIAGNOSIS — O26.13 LOW MATERNAL WEIGHT GAIN, THIRD TRIMESTER: ICD-10-CM

## 2018-02-19 DIAGNOSIS — O34.219 PREVIOUS CESAREAN DELIVERY AFFECTING PREGNANCY: ICD-10-CM

## 2018-02-19 PROCEDURE — 76815 OB US LIMITED FETUS(S): CPT

## 2018-02-20 ENCOUNTER — ROUTINE PRENATAL (OUTPATIENT)
Dept: OBGYN CLINIC | Age: 28
End: 2018-02-20

## 2018-02-20 ENCOUNTER — TELEPHONE (OUTPATIENT)
Dept: OBGYN CLINIC | Age: 28
End: 2018-02-20

## 2018-02-20 VITALS
DIASTOLIC BLOOD PRESSURE: 68 MMHG | SYSTOLIC BLOOD PRESSURE: 102 MMHG | BODY MASS INDEX: 22.04 KG/M2 | WEIGHT: 111 LBS | HEART RATE: 84 BPM

## 2018-02-20 DIAGNOSIS — O40.3XX0 POLYHYDRAMNIOS AFFECTING PREGNANCY IN THIRD TRIMESTER: ICD-10-CM

## 2018-02-20 DIAGNOSIS — O34.219 PREVIOUS CESAREAN DELIVERY AFFECTING PREGNANCY: ICD-10-CM

## 2018-02-20 DIAGNOSIS — Z34.83 ENCOUNTER FOR SUPERVISION OF OTHER NORMAL PREGNANCY IN THIRD TRIMESTER: ICD-10-CM

## 2018-02-20 DIAGNOSIS — G43.019 INTRACTABLE MIGRAINE WITHOUT AURA AND WITHOUT STATUS MIGRAINOSUS: ICD-10-CM

## 2018-02-20 DIAGNOSIS — Z34.83 ENCOUNTER FOR SUPERVISION OF OTHER NORMAL PREGNANCY IN THIRD TRIMESTER: Primary | ICD-10-CM

## 2018-02-20 DIAGNOSIS — O99.810 ABNORMAL GLUCOSE TOLERANCE IN MOTHER COMPLICATING PREGNANCY: ICD-10-CM

## 2018-02-20 LAB
BACTERIA: ABNORMAL /HPF
BILIRUBIN URINE: NEGATIVE
BILIRUBIN, POC: ABNORMAL
BLOOD URINE, POC: ABNORMAL
BLOOD, URINE: NEGATIVE
CLARITY, POC: CLEAR
CLARITY: CLEAR
COLOR, POC: YELLOW
COLOR: ABNORMAL
EPITHELIAL CELLS, UA: ABNORMAL /HPF
GLUCOSE URINE, POC: ABNORMAL
GLUCOSE URINE: NEGATIVE MG/DL
KETONES, POC: ABNORMAL
KETONES, URINE: NEGATIVE MG/DL
LEUKOCYTE EST, POC: ABNORMAL
LEUKOCYTE ESTERASE, URINE: ABNORMAL
MUCUS: PRESENT
NITRITE, POC: ABNORMAL
NITRITE, URINE: NEGATIVE
PH UA: 7.5 (ref 5–9)
PH, POC: 8
PROTEIN UA: NEGATIVE MG/DL
PROTEIN, POC: ABNORMAL
RBC UA: ABNORMAL /HPF (ref 0–2)
SPECIFIC GRAVITY UA: 1.02 (ref 1–1.03)
SPECIFIC GRAVITY, POC: 1.01
UROBILINOGEN, POC: ABNORMAL
UROBILINOGEN, URINE: 1 E.U./DL
WBC UA: ABNORMAL /HPF (ref 0–5)

## 2018-02-20 PROCEDURE — 0502F SUBSEQUENT PRENATAL CARE: CPT | Performed by: OBSTETRICS & GYNECOLOGY

## 2018-02-20 NOTE — TELEPHONE ENCOUNTER
Cirilo Patel called this afternoon and is wondering if there was any way she could just be scheduled for a .

## 2018-02-22 LAB — URINE CULTURE, ROUTINE: NORMAL

## 2018-02-23 ENCOUNTER — NURSE ONLY (OUTPATIENT)
Dept: OBGYN CLINIC | Age: 28
End: 2018-02-23
Payer: COMMERCIAL

## 2018-02-23 DIAGNOSIS — O40.3XX0 POLYHYDRAMNIOS AFFECTING PREGNANCY IN THIRD TRIMESTER: Primary | ICD-10-CM

## 2018-02-23 DIAGNOSIS — O34.219 PREVIOUS CESAREAN DELIVERY AFFECTING PREGNANCY: ICD-10-CM

## 2018-02-23 PROCEDURE — 99999 PR OFFICE/OUTPT VISIT,PROCEDURE ONLY: CPT | Performed by: OBSTETRICS & GYNECOLOGY

## 2018-02-23 PROCEDURE — 59025 FETAL NON-STRESS TEST: CPT | Performed by: OBSTETRICS & GYNECOLOGY

## 2018-02-27 ENCOUNTER — ROUTINE PRENATAL (OUTPATIENT)
Dept: OBGYN CLINIC | Age: 28
End: 2018-02-27

## 2018-02-27 VITALS
DIASTOLIC BLOOD PRESSURE: 64 MMHG | WEIGHT: 110 LBS | BODY MASS INDEX: 21.85 KG/M2 | HEART RATE: 76 BPM | SYSTOLIC BLOOD PRESSURE: 92 MMHG

## 2018-02-27 DIAGNOSIS — O40.3XX0 POLYHYDRAMNIOS AFFECTING PREGNANCY IN THIRD TRIMESTER: ICD-10-CM

## 2018-02-27 DIAGNOSIS — O99.810 ABNORMAL GLUCOSE TOLERANCE IN MOTHER COMPLICATING PREGNANCY: ICD-10-CM

## 2018-02-27 DIAGNOSIS — Z34.83 ENCOUNTER FOR SUPERVISION OF OTHER NORMAL PREGNANCY IN THIRD TRIMESTER: ICD-10-CM

## 2018-02-27 DIAGNOSIS — Z34.83 ENCOUNTER FOR SUPERVISION OF OTHER NORMAL PREGNANCY IN THIRD TRIMESTER: Primary | ICD-10-CM

## 2018-02-27 DIAGNOSIS — O34.219 PREVIOUS CESAREAN DELIVERY AFFECTING PREGNANCY: ICD-10-CM

## 2018-02-27 LAB
BACTERIA: NORMAL /HPF
BILIRUBIN URINE: ABNORMAL
BILIRUBIN, POC: ABNORMAL
BLOOD URINE, POC: ABNORMAL
BLOOD, URINE: NEGATIVE
CLARITY, POC: CLEAR
CLARITY: ABNORMAL
COLOR, POC: YELLOW
COLOR: ABNORMAL
EPITHELIAL CELLS, UA: NORMAL /HPF
GLUCOSE URINE, POC: ABNORMAL
GLUCOSE URINE: NEGATIVE MG/DL
KETONES, POC: ABNORMAL
KETONES, URINE: NEGATIVE MG/DL
LEUKOCYTE EST, POC: ABNORMAL
LEUKOCYTE ESTERASE, URINE: ABNORMAL
MUCUS: PRESENT
NITRITE, POC: ABNORMAL
NITRITE, URINE: NEGATIVE
PH UA: 7 (ref 5–9)
PH, POC: 7
PROTEIN UA: ABNORMAL MG/DL
PROTEIN, POC: ABNORMAL
RBC UA: NORMAL /HPF (ref 0–2)
SPECIFIC GRAVITY UA: 1.03 (ref 1–1.03)
SPECIFIC GRAVITY, POC: 1.01
UROBILINOGEN, POC: ABNORMAL
UROBILINOGEN, URINE: 1 E.U./DL
WBC UA: NORMAL /HPF (ref 0–5)

## 2018-02-27 PROCEDURE — G8420 CALC BMI NORM PARAMETERS: HCPCS | Performed by: OBSTETRICS & GYNECOLOGY

## 2018-02-27 PROCEDURE — 4004F PT TOBACCO SCREEN RCVD TLK: CPT | Performed by: OBSTETRICS & GYNECOLOGY

## 2018-02-27 PROCEDURE — G8427 DOCREV CUR MEDS BY ELIG CLIN: HCPCS | Performed by: OBSTETRICS & GYNECOLOGY

## 2018-02-27 PROCEDURE — G8484 FLU IMMUNIZE NO ADMIN: HCPCS | Performed by: OBSTETRICS & GYNECOLOGY

## 2018-02-27 PROCEDURE — 0502F SUBSEQUENT PRENATAL CARE: CPT | Performed by: OBSTETRICS & GYNECOLOGY

## 2018-02-27 NOTE — PROGRESS NOTES
OB visit      Thierry Dunn is a 28y.o. year old female [id number] @ 37 wk , REY 3/20/2018 BY us 11 wk US not commensurate with LMP. POB: FTSVD @ 39 wks , girl Donnal Profit. Uncomplicated.  Emergent pLTCS @ 34 wks secondary to fetal distress/ IUGR, dwarfism/ left kidney failiure/ heart defects. SAB - unknown. Repeat LTCS , male , Eric White , 2017           PGYN: denies    PMH: denies    PSH: PC/S             Laparoscopy for ovarian cyst     MedS: denies    Allergic to amoxicillin - hives and throat swells                   Keflex - Hives    SOC hx : neg x 3    FH : grandfather - diabetes          Uncle mother side- diabetes. BP 92/64   Pulse 76   Wt 110 lb (49.9 kg)   LMP 2017   BMI 21.85 kg/m²   Past Medical History:   Diagnosis Date    Anemia     Intractable migraine without aura and without status migrainosus 2017    Kidney stones     Unspecified asthma(493.90)      Past Surgical History:   Procedure Laterality Date    OVARIAN CYST SURGERY      laparascopic sx    TONSILLECTOMY           Review of Systems  Constitutional: negative  Genitourinary:see above  Integument/breast: negative  Behavioral/Psych: negative  Endocrine: negative    All other systems reviewed and are negative. Physical Exam:  BP 92/64   Pulse 76   Wt 110 lb (49.9 kg)   LMP 2017   BMI 21.85 kg/m²   Physical exam :   General Appearance: alert and oriented to person, place and time, well-developed and well-nourished, in no acute distress  Cardiovascular: normal rate, normal S1 and S2, no gallops, intact distal pulses and no carotid bruits  Abdomen: soft, non-tender, non-distended, normal bowel sounds, gravid uterus:    HOF : 37 cm     FHT : 156 bpm by BSUS    Assessment:     1. Encounter for supervision of other normal pregnancy in third trimester    2. Previous  delivery affecting pregnancy    3. Polyhydramnios affecting pregnancy in third trimester    4.

## 2018-03-01 LAB — URINE CULTURE, ROUTINE: NORMAL

## 2018-03-07 ENCOUNTER — ROUTINE PRENATAL (OUTPATIENT)
Dept: OBGYN CLINIC | Age: 28
End: 2018-03-07

## 2018-03-07 VITALS — DIASTOLIC BLOOD PRESSURE: 62 MMHG | HEART RATE: 88 BPM | SYSTOLIC BLOOD PRESSURE: 92 MMHG

## 2018-03-07 DIAGNOSIS — Z34.83 ENCOUNTER FOR SUPERVISION OF OTHER NORMAL PREGNANCY IN THIRD TRIMESTER: Primary | ICD-10-CM

## 2018-03-07 DIAGNOSIS — Z34.83 ENCOUNTER FOR SUPERVISION OF OTHER NORMAL PREGNANCY IN THIRD TRIMESTER: ICD-10-CM

## 2018-03-07 DIAGNOSIS — O40.3XX0 POLYHYDRAMNIOS AFFECTING PREGNANCY IN THIRD TRIMESTER: ICD-10-CM

## 2018-03-07 DIAGNOSIS — O34.219 PREVIOUS CESAREAN DELIVERY AFFECTING PREGNANCY: ICD-10-CM

## 2018-03-07 DIAGNOSIS — O99.810 ABNORMAL GLUCOSE TOLERANCE IN MOTHER COMPLICATING PREGNANCY: ICD-10-CM

## 2018-03-07 DIAGNOSIS — G43.019 INTRACTABLE MIGRAINE WITHOUT AURA AND WITHOUT STATUS MIGRAINOSUS: ICD-10-CM

## 2018-03-07 LAB
BACTERIA: NORMAL /HPF
BILIRUBIN URINE: NEGATIVE
BILIRUBIN, POC: ABNORMAL
BLOOD URINE, POC: ABNORMAL
BLOOD, URINE: NEGATIVE
CLARITY, POC: CLEAR
CLARITY: CLEAR
COLOR, POC: YELLOW
COLOR: YELLOW
EPITHELIAL CELLS, UA: NORMAL /HPF
GLUCOSE URINE, POC: ABNORMAL
GLUCOSE URINE: NEGATIVE MG/DL
KETONES, POC: ABNORMAL
KETONES, URINE: NEGATIVE MG/DL
LEUKOCYTE EST, POC: ABNORMAL
LEUKOCYTE ESTERASE, URINE: ABNORMAL
NITRITE, POC: ABNORMAL
NITRITE, URINE: NEGATIVE
PH UA: 7 (ref 5–9)
PH, POC: 6.5
PROTEIN UA: NEGATIVE MG/DL
PROTEIN, POC: ABNORMAL
RBC UA: NORMAL /HPF (ref 0–2)
SPECIFIC GRAVITY UA: 1.01 (ref 1–1.03)
SPECIFIC GRAVITY, POC: 1.01
UROBILINOGEN, POC: ABNORMAL
UROBILINOGEN, URINE: 0.2 E.U./DL
WBC UA: NORMAL /HPF (ref 0–5)

## 2018-03-07 PROCEDURE — G8427 DOCREV CUR MEDS BY ELIG CLIN: HCPCS | Performed by: OBSTETRICS & GYNECOLOGY

## 2018-03-07 PROCEDURE — G8484 FLU IMMUNIZE NO ADMIN: HCPCS | Performed by: OBSTETRICS & GYNECOLOGY

## 2018-03-07 PROCEDURE — 0502F SUBSEQUENT PRENATAL CARE: CPT | Performed by: OBSTETRICS & GYNECOLOGY

## 2018-03-07 PROCEDURE — G8420 CALC BMI NORM PARAMETERS: HCPCS | Performed by: OBSTETRICS & GYNECOLOGY

## 2018-03-07 PROCEDURE — 4004F PT TOBACCO SCREEN RCVD TLK: CPT | Performed by: OBSTETRICS & GYNECOLOGY

## 2018-03-09 LAB — URINE CULTURE, ROUTINE: NORMAL

## 2018-03-14 ENCOUNTER — ROUTINE PRENATAL (OUTPATIENT)
Dept: OBGYN CLINIC | Age: 28
End: 2018-03-14

## 2018-03-14 VITALS
WEIGHT: 114 LBS | DIASTOLIC BLOOD PRESSURE: 78 MMHG | BODY MASS INDEX: 22.64 KG/M2 | SYSTOLIC BLOOD PRESSURE: 108 MMHG | HEART RATE: 88 BPM

## 2018-03-14 DIAGNOSIS — O34.219 PREVIOUS CESAREAN DELIVERY AFFECTING PREGNANCY: ICD-10-CM

## 2018-03-14 DIAGNOSIS — O40.3XX0 POLYHYDRAMNIOS AFFECTING PREGNANCY IN THIRD TRIMESTER: ICD-10-CM

## 2018-03-14 DIAGNOSIS — G43.019 INTRACTABLE MIGRAINE WITHOUT AURA AND WITHOUT STATUS MIGRAINOSUS: ICD-10-CM

## 2018-03-14 DIAGNOSIS — Z34.83 ENCOUNTER FOR SUPERVISION OF OTHER NORMAL PREGNANCY IN THIRD TRIMESTER: ICD-10-CM

## 2018-03-14 DIAGNOSIS — Z34.83 ENCOUNTER FOR SUPERVISION OF OTHER NORMAL PREGNANCY IN THIRD TRIMESTER: Primary | ICD-10-CM

## 2018-03-14 DIAGNOSIS — O99.810 ABNORMAL GLUCOSE TOLERANCE IN MOTHER COMPLICATING PREGNANCY: ICD-10-CM

## 2018-03-14 LAB
BACTERIA: ABNORMAL /HPF
BILIRUBIN URINE: NEGATIVE
BILIRUBIN, POC: ABNORMAL
BLOOD URINE, POC: ABNORMAL
BLOOD, URINE: NEGATIVE
CLARITY, POC: CLEAR
CLARITY: ABNORMAL
COLOR, POC: YELLOW
COLOR: YELLOW
EPITHELIAL CELLS, UA: ABNORMAL /HPF
GLUCOSE URINE, POC: ABNORMAL
GLUCOSE URINE: NEGATIVE MG/DL
KETONES, POC: ABNORMAL
KETONES, URINE: NEGATIVE MG/DL
LEUKOCYTE EST, POC: ABNORMAL
LEUKOCYTE ESTERASE, URINE: ABNORMAL
NITRITE, POC: ABNORMAL
NITRITE, URINE: NEGATIVE
PH UA: 7 (ref 5–9)
PH, POC: 7
PROTEIN UA: NEGATIVE MG/DL
PROTEIN, POC: ABNORMAL
RBC UA: ABNORMAL /HPF (ref 0–2)
SPECIFIC GRAVITY UA: 1.02 (ref 1–1.03)
SPECIFIC GRAVITY, POC: 1.01
UROBILINOGEN, POC: ABNORMAL
UROBILINOGEN, URINE: 1 E.U./DL
WBC UA: ABNORMAL /HPF (ref 0–5)

## 2018-03-14 PROCEDURE — G8484 FLU IMMUNIZE NO ADMIN: HCPCS | Performed by: OBSTETRICS & GYNECOLOGY

## 2018-03-14 PROCEDURE — 4004F PT TOBACCO SCREEN RCVD TLK: CPT | Performed by: OBSTETRICS & GYNECOLOGY

## 2018-03-14 PROCEDURE — 0502F SUBSEQUENT PRENATAL CARE: CPT | Performed by: OBSTETRICS & GYNECOLOGY

## 2018-03-14 PROCEDURE — G8420 CALC BMI NORM PARAMETERS: HCPCS | Performed by: OBSTETRICS & GYNECOLOGY

## 2018-03-14 PROCEDURE — G8427 DOCREV CUR MEDS BY ELIG CLIN: HCPCS | Performed by: OBSTETRICS & GYNECOLOGY

## 2018-03-14 NOTE — PROGRESS NOTES
OB visit      Carlos Enrique De La Vega is a 28y.o. year old female [id number] @ 39.1 wk , REY 3/20/2018 BY us 11 wk US not commensurate with LMP. POB: FTSVD @ 39 wks , girl Sterling Elizabeth. Uncomplicated.  Emergent pLTCS @ 34 wks secondary to fetal distress/ IUGR, dwarfism/ left kidney failiure/ heart defects. SAB - unknown. Repeat LTCS , male , Radha Faust ,            PGYN: denies    PMH: denies    PSH: PC/S             Laparoscopy for ovarian cyst     MedS: denies    Allergic to amoxicillin - hives and throat swells                   Keflex - Hives    SOC hx : neg x 3    FH : grandfather - diabetes          Uncle mother side- diabetes. /78   Pulse 88   Wt 114 lb (51.7 kg)   LMP 2017   BMI 22.64 kg/m²   Past Medical History:   Diagnosis Date    Anemia     Intractable migraine without aura and without status migrainosus 2017    Kidney stones     Unspecified asthma(493.90)      Past Surgical History:   Procedure Laterality Date    OVARIAN CYST SURGERY      laparascopic sx    TONSILLECTOMY           Review of Systems  Constitutional: negative  Genitourinary:see above  Integument/breast: negative  Behavioral/Psych: negative  Endocrine: negative    All other systems reviewed and are negative. Physical Exam:  /78   Pulse 88   Wt 114 lb (51.7 kg)   LMP 2017   BMI 22.64 kg/m²   Physical exam :   General Appearance: alert and oriented to person, place and time, well-developed and well-nourished, in no acute distress  Cardiovascular: normal rate, normal S1 and S2, no gallops, intact distal pulses and no carotid bruits  Abdomen: soft, non-tender, non-distended, normal bowel sounds, gravid uterus:    HOF : 37 cm     FHT : 156 bpm by BSUS    Assessment:     1. Encounter for supervision of other normal pregnancy in third trimester    2. Previous  delivery affecting pregnancy    3.  Polyhydramnios affecting pregnancy in third trimester

## 2018-03-15 ENCOUNTER — HOSPITAL ENCOUNTER (INPATIENT)
Age: 28
LOS: 1 days | Discharge: HOME | DRG: 560 | End: 2018-03-16
Attending: OBSTETRICS & GYNECOLOGY | Admitting: OBSTETRICS & GYNECOLOGY
Payer: COMMERCIAL

## 2018-03-15 PROBLEM — Z78.9 ADMITTED TO LABOR AND DELIVERY: Status: ACTIVE | Noted: 2018-03-15

## 2018-03-15 LAB
ABO/RH: NORMAL
ALBUMIN SERPL-MCNC: 3.5 G/DL (ref 3.9–4.9)
ALP BLD-CCNC: 139 U/L (ref 40–130)
ALT SERPL-CCNC: 10 U/L (ref 0–33)
AMPHETAMINE SCREEN, URINE: NORMAL
ANION GAP SERPL CALCULATED.3IONS-SCNC: 17 MEQ/L (ref 7–13)
ANTIBODY SCREEN: NORMAL
AST SERPL-CCNC: 15 U/L (ref 0–35)
BACTERIA: NORMAL /HPF
BARBITURATE SCREEN URINE: NORMAL
BASOPHILS ABSOLUTE: 0 K/UL (ref 0–0.2)
BASOPHILS RELATIVE PERCENT: 0.3 %
BENZODIAZEPINE SCREEN, URINE: NORMAL
BILIRUB SERPL-MCNC: 0.3 MG/DL (ref 0–1.2)
BILIRUBIN URINE: NEGATIVE
BLOOD, URINE: NEGATIVE
BUN BLDV-MCNC: 8 MG/DL (ref 6–20)
CALCIUM SERPL-MCNC: 8.4 MG/DL (ref 8.6–10.2)
CANNABINOID SCREEN URINE: NORMAL
CHLORIDE BLD-SCNC: 102 MEQ/L (ref 98–107)
CLARITY: ABNORMAL
CO2: 17 MEQ/L (ref 22–29)
COCAINE METABOLITE SCREEN URINE: NORMAL
COLOR: YELLOW
CREAT SERPL-MCNC: 0.42 MG/DL (ref 0.5–0.9)
EOSINOPHILS ABSOLUTE: 0 K/UL (ref 0–0.7)
EOSINOPHILS RELATIVE PERCENT: 0.4 %
EPITHELIAL CELLS, UA: NORMAL /HPF
GFR AFRICAN AMERICAN: >60
GFR NON-AFRICAN AMERICAN: >60
GLOBULIN: 2.8 G/DL (ref 2.3–3.5)
GLUCOSE BLD-MCNC: 83 MG/DL (ref 74–109)
GLUCOSE URINE: NEGATIVE MG/DL
HCT VFR BLD CALC: 30.1 % (ref 37–47)
HEMOGLOBIN: 10.1 G/DL (ref 12–16)
HEPATITIS B SURFACE ANTIGEN INTERPRETATION: NORMAL
KETONES, URINE: NEGATIVE MG/DL
LEUKOCYTE ESTERASE, URINE: ABNORMAL
LYMPHOCYTES ABSOLUTE: 2.8 K/UL (ref 1–4.8)
LYMPHOCYTES RELATIVE PERCENT: 23.7 %
Lab: NORMAL
MCH RBC QN AUTO: 32 PG (ref 27–31.3)
MCHC RBC AUTO-ENTMCNC: 33.7 % (ref 33–37)
MCV RBC AUTO: 94.9 FL (ref 82–100)
MONOCYTES ABSOLUTE: 0.5 K/UL (ref 0.2–0.8)
MONOCYTES RELATIVE PERCENT: 4.6 %
NEUTROPHILS ABSOLUTE: 8.5 K/UL (ref 1.4–6.5)
NEUTROPHILS RELATIVE PERCENT: 71 %
NITRITE, URINE: NEGATIVE
OPIATE SCREEN URINE: NORMAL
PDW BLD-RTO: 12.7 % (ref 11.5–14.5)
PH UA: 7.5 (ref 5–9)
PHENCYCLIDINE SCREEN URINE: NORMAL
PLATELET # BLD: 206 K/UL (ref 130–400)
POTASSIUM SERPL-SCNC: 4.2 MEQ/L (ref 3.5–5.1)
PROTEIN UA: NEGATIVE MG/DL
RBC # BLD: 3.17 M/UL (ref 4.2–5.4)
RBC UA: NORMAL /HPF (ref 0–2)
SODIUM BLD-SCNC: 136 MEQ/L (ref 132–144)
SPECIFIC GRAVITY UA: 1.02 (ref 1–1.03)
TOTAL PROTEIN: 6.3 G/DL (ref 6.4–8.1)
UROBILINOGEN, URINE: 1 E.U./DL
WBC # BLD: 11.9 K/UL (ref 4.8–10.8)
WBC UA: NORMAL /HPF (ref 0–5)

## 2018-03-15 PROCEDURE — 86901 BLOOD TYPING SEROLOGIC RH(D): CPT

## 2018-03-15 PROCEDURE — 86850 RBC ANTIBODY SCREEN: CPT

## 2018-03-15 PROCEDURE — 6360000002 HC RX W HCPCS

## 2018-03-15 PROCEDURE — 87340 HEPATITIS B SURFACE AG IA: CPT

## 2018-03-15 PROCEDURE — 86592 SYPHILIS TEST NON-TREP QUAL: CPT

## 2018-03-15 PROCEDURE — 6370000000 HC RX 637 (ALT 250 FOR IP): Performed by: OBSTETRICS & GYNECOLOGY

## 2018-03-15 PROCEDURE — 1220000000 HC SEMI PRIVATE OB R&B

## 2018-03-15 PROCEDURE — 86900 BLOOD TYPING SEROLOGIC ABO: CPT

## 2018-03-15 PROCEDURE — 80307 DRUG TEST PRSMV CHEM ANLYZR: CPT

## 2018-03-15 PROCEDURE — 4A1HXCZ MONITORING OF PRODUCTS OF CONCEPTION, CARDIAC RATE, EXTERNAL APPROACH: ICD-10-PCS | Performed by: OBSTETRICS & GYNECOLOGY

## 2018-03-15 PROCEDURE — 2580000003 HC RX 258

## 2018-03-15 PROCEDURE — 85025 COMPLETE CBC W/AUTO DIFF WBC: CPT

## 2018-03-15 PROCEDURE — 81001 URINALYSIS AUTO W/SCOPE: CPT

## 2018-03-15 PROCEDURE — 59400 OBSTETRICAL CARE: CPT | Performed by: OBSTETRICS & GYNECOLOGY

## 2018-03-15 PROCEDURE — 80053 COMPREHEN METABOLIC PANEL: CPT

## 2018-03-15 PROCEDURE — 36415 COLL VENOUS BLD VENIPUNCTURE: CPT

## 2018-03-15 PROCEDURE — 99222 1ST HOSP IP/OBS MODERATE 55: CPT | Performed by: OBSTETRICS & GYNECOLOGY

## 2018-03-15 PROCEDURE — 2580000003 HC RX 258: Performed by: OBSTETRICS & GYNECOLOGY

## 2018-03-15 RX ORDER — SODIUM CHLORIDE 0.9 % (FLUSH) 0.9 %
10 SYRINGE (ML) INJECTION PRN
Status: DISCONTINUED | OUTPATIENT
Start: 2018-03-15 | End: 2018-03-16 | Stop reason: HOSPADM

## 2018-03-15 RX ORDER — ACETAMINOPHEN 325 MG/1
650 TABLET ORAL EVERY 4 HOURS PRN
Status: DISCONTINUED | OUTPATIENT
Start: 2018-03-15 | End: 2018-03-16 | Stop reason: HOSPADM

## 2018-03-15 RX ORDER — SODIUM CHLORIDE, SODIUM LACTATE, POTASSIUM CHLORIDE, CALCIUM CHLORIDE 600; 310; 30; 20 MG/100ML; MG/100ML; MG/100ML; MG/100ML
INJECTION, SOLUTION INTRAVENOUS CONTINUOUS
Status: DISCONTINUED | OUTPATIENT
Start: 2018-03-15 | End: 2018-03-16 | Stop reason: HOSPADM

## 2018-03-15 RX ORDER — SODIUM CHLORIDE 0.9 % (FLUSH) 0.9 %
10 SYRINGE (ML) INJECTION EVERY 12 HOURS SCHEDULED
Status: DISCONTINUED | OUTPATIENT
Start: 2018-03-15 | End: 2018-03-16 | Stop reason: HOSPADM

## 2018-03-15 RX ORDER — SODIUM CHLORIDE, SODIUM LACTATE, POTASSIUM CHLORIDE, CALCIUM CHLORIDE 600; 310; 30; 20 MG/100ML; MG/100ML; MG/100ML; MG/100ML
INJECTION, SOLUTION INTRAVENOUS
Status: COMPLETED
Start: 2018-03-15 | End: 2018-03-15

## 2018-03-15 RX ORDER — DOCUSATE SODIUM 100 MG/1
100 CAPSULE, LIQUID FILLED ORAL 2 TIMES DAILY
Status: DISCONTINUED | OUTPATIENT
Start: 2018-03-15 | End: 2018-03-16 | Stop reason: HOSPADM

## 2018-03-15 RX ORDER — ONDANSETRON 2 MG/ML
4 INJECTION INTRAMUSCULAR; INTRAVENOUS EVERY 6 HOURS PRN
Status: DISCONTINUED | OUTPATIENT
Start: 2018-03-15 | End: 2018-03-16 | Stop reason: HOSPADM

## 2018-03-15 RX ORDER — LANOLIN 100 %
OINTMENT (GRAM) TOPICAL PRN
Status: DISCONTINUED | OUTPATIENT
Start: 2018-03-15 | End: 2018-03-16 | Stop reason: HOSPADM

## 2018-03-15 RX ADMIN — Medication 10 ML: at 15:46

## 2018-03-15 RX ADMIN — SODIUM CHLORIDE, SODIUM LACTATE, POTASSIUM CHLORIDE, CALCIUM CHLORIDE: 600; 310; 30; 20 INJECTION, SOLUTION INTRAVENOUS at 12:24

## 2018-03-15 RX ADMIN — ACETAMINOPHEN 650 MG: 325 TABLET, FILM COATED ORAL at 15:50

## 2018-03-15 RX ADMIN — Medication: at 15:51

## 2018-03-15 RX ADMIN — Medication 500 ML: at 12:47

## 2018-03-15 RX ADMIN — ACETAMINOPHEN 650 MG: 325 TABLET, FILM COATED ORAL at 21:50

## 2018-03-15 RX ADMIN — SODIUM CHLORIDE, POTASSIUM CHLORIDE, SODIUM LACTATE AND CALCIUM CHLORIDE: 600; 310; 30; 20 INJECTION, SOLUTION INTRAVENOUS at 12:24

## 2018-03-15 ASSESSMENT — PAIN SCALES - GENERAL
PAINLEVEL_OUTOF10: 2
PAINLEVEL_OUTOF10: 4

## 2018-03-15 NOTE — OP NOTE
Jacki Barkley [address]                       [address], [address] [facility]                                 OPERATIVE REPORT    PATIENT NAME: Radha Ewing                 :        1990  MED REC NO:   [id number]                            ROOM:       7017  ACCOUNT NO:   [de-identified]                           ADMIT DATE: 03/15/2018  PROVIDER:     Kenneth Cadet MD    DATE OF PROCEDURE:  03/15/2018    PREOPERATIVE DIAGNOSES:  1. Intrauterine pregnancy at 39 plus weeks gestation, in active labor with  spontaneous rupture of membranes. 2.  Prior history of  (desires repeat ). POSTOPERATIVE DIAGNOSIS:  Viable female infant (birth weight 6 pounds and 1  ounce; Apgars 9 and 9). OPERATION PERFORMED:  Assisted spontaneous vaginal delivery. SURGEON:  Diane Green MD    ANESTHETIC:  None. ESTIMATED BLOOD LOSS: 300 mL. REPLACED:  Crystalloid. SPECIALIZED DRUGS:  Postpartum dilute Pitocin for hemostatic purposes. CONDITION:  Stable. DESCRIPTION OF LABOR AND DELIVERY PROCESS:  For complete details, see H and  P from Dr. Jean Carlos Munguia office. In summary, this is a 59-year-old  5,  para 3, AB 1 female whose REY was 2018. The patient is now 44 plus  weeks' gestation and presents to Labor and Delivery with the onset of  contractions from approximately 6 a.m. this morning. She did sustain  spontaneous rupture of membranes at approximately 12 noon with clear  amniotic fluid. In review of her obstetrical history, she had one full  term vaginal delivery in  that was uncomplicated. That infant weighed  approximately 6 pounds. In 2004, she had a  C section for an  infant that was found to have renal failure as well as dwarfism. In  2017, she had a full term successful . As noted, she has been  following with Dr. Heike Smith for this pregnancy and she was treated for  positive chlamydial culture in 2017.   She had a negative JENNIFER

## 2018-03-16 VITALS
DIASTOLIC BLOOD PRESSURE: 89 MMHG | RESPIRATION RATE: 20 BRPM | SYSTOLIC BLOOD PRESSURE: 135 MMHG | HEART RATE: 58 BPM | TEMPERATURE: 98.4 F

## 2018-03-16 LAB
HCT VFR BLD CALC: 27.6 % (ref 37–47)
HEMOGLOBIN: 9.5 G/DL (ref 12–16)
RPR: NORMAL
URINE CULTURE, ROUTINE: NORMAL

## 2018-03-16 PROCEDURE — 7200000001 HC VAGINAL DELIVERY

## 2018-03-16 PROCEDURE — 90471 IMMUNIZATION ADMIN: CPT | Performed by: OBSTETRICS & GYNECOLOGY

## 2018-03-16 PROCEDURE — 6370000000 HC RX 637 (ALT 250 FOR IP): Performed by: OBSTETRICS & GYNECOLOGY

## 2018-03-16 PROCEDURE — 85018 HEMOGLOBIN: CPT

## 2018-03-16 PROCEDURE — 6360000002 HC RX W HCPCS: Performed by: OBSTETRICS & GYNECOLOGY

## 2018-03-16 PROCEDURE — 90715 TDAP VACCINE 7 YRS/> IM: CPT | Performed by: OBSTETRICS & GYNECOLOGY

## 2018-03-16 PROCEDURE — 99024 POSTOP FOLLOW-UP VISIT: CPT | Performed by: OBSTETRICS & GYNECOLOGY

## 2018-03-16 PROCEDURE — 85014 HEMATOCRIT: CPT

## 2018-03-16 PROCEDURE — 36415 COLL VENOUS BLD VENIPUNCTURE: CPT

## 2018-03-16 RX ADMIN — Medication: at 10:40

## 2018-03-16 RX ADMIN — ACETAMINOPHEN 650 MG: 325 TABLET, FILM COATED ORAL at 06:44

## 2018-03-16 RX ADMIN — TETANUS TOXOID, REDUCED DIPHTHERIA TOXOID AND ACELLULAR PERTUSSIS VACCINE, ADSORBED 0.5 ML: 5; 2.5; 8; 8; 2.5 SUSPENSION INTRAMUSCULAR at 10:40

## 2018-03-16 ASSESSMENT — PAIN SCALES - GENERAL: PAINLEVEL_OUTOF10: 5

## 2018-03-16 NOTE — FLOWSHEET NOTE
Dr Vianca Hicks notified patient wants to be discharged. Infant to stay until tomorrow.   oks discharge for mother

## 2018-05-01 ENCOUNTER — TELEPHONE (OUTPATIENT)
Dept: OBGYN CLINIC | Age: 28
End: 2018-05-01

## 2018-05-02 RX ORDER — FLUCONAZOLE 150 MG/1
TABLET ORAL
Qty: 3 TABLET | Refills: 0 | Status: SHIPPED | OUTPATIENT
Start: 2018-05-02 | End: 2018-08-10 | Stop reason: ALTCHOICE

## 2018-08-07 ENCOUNTER — OFFICE VISIT (OUTPATIENT)
Dept: FAMILY MEDICINE CLINIC | Age: 28
End: 2018-08-07
Payer: COMMERCIAL

## 2018-08-07 VITALS
SYSTOLIC BLOOD PRESSURE: 120 MMHG | WEIGHT: 91.2 LBS | OXYGEN SATURATION: 97 % | DIASTOLIC BLOOD PRESSURE: 64 MMHG | HEIGHT: 60 IN | HEART RATE: 60 BPM | BODY MASS INDEX: 17.91 KG/M2

## 2018-08-07 DIAGNOSIS — F41.9 ANXIETY: ICD-10-CM

## 2018-08-07 DIAGNOSIS — R63.6 UNDERWEIGHT: ICD-10-CM

## 2018-08-07 DIAGNOSIS — R25.2 MUSCLE CRAMPS: ICD-10-CM

## 2018-08-07 DIAGNOSIS — Z13.220 SCREENING, LIPID: ICD-10-CM

## 2018-08-07 DIAGNOSIS — F41.9 ANXIETY: Primary | ICD-10-CM

## 2018-08-07 LAB
ALBUMIN SERPL-MCNC: 4.7 G/DL (ref 3.9–4.9)
ALP BLD-CCNC: 46 U/L (ref 40–130)
ALT SERPL-CCNC: 15 U/L (ref 0–33)
ANION GAP SERPL CALCULATED.3IONS-SCNC: 12 MEQ/L (ref 7–13)
AST SERPL-CCNC: 17 U/L (ref 0–35)
BILIRUB SERPL-MCNC: 0.4 MG/DL (ref 0–1.2)
BUN BLDV-MCNC: 9 MG/DL (ref 6–20)
CALCIUM SERPL-MCNC: 9.6 MG/DL (ref 8.6–10.2)
CHLORIDE BLD-SCNC: 101 MEQ/L (ref 98–107)
CHOLESTEROL, TOTAL: 119 MG/DL (ref 0–199)
CO2: 23 MEQ/L (ref 22–29)
CREAT SERPL-MCNC: 0.6 MG/DL (ref 0.5–0.9)
GFR AFRICAN AMERICAN: >60
GFR NON-AFRICAN AMERICAN: >60
GLOBULIN: 3 G/DL (ref 2.3–3.5)
GLUCOSE BLD-MCNC: 78 MG/DL (ref 74–109)
HCT VFR BLD CALC: 41 % (ref 37–47)
HDLC SERPL-MCNC: 61 MG/DL (ref 40–59)
HEMOGLOBIN: 13.9 G/DL (ref 12–16)
LDL CHOLESTEROL CALCULATED: 28 MG/DL (ref 0–129)
MCH RBC QN AUTO: 32.3 PG (ref 27–31.3)
MCHC RBC AUTO-ENTMCNC: 33.8 % (ref 33–37)
MCV RBC AUTO: 95.4 FL (ref 82–100)
PDW BLD-RTO: 13.4 % (ref 11.5–14.5)
PLATELET # BLD: 206 K/UL (ref 130–400)
POTASSIUM SERPL-SCNC: 4.3 MEQ/L (ref 3.5–5.1)
RBC # BLD: 4.3 M/UL (ref 4.2–5.4)
SODIUM BLD-SCNC: 136 MEQ/L (ref 132–144)
TOTAL PROTEIN: 7.7 G/DL (ref 6.4–8.1)
TRIGL SERPL-MCNC: 151 MG/DL (ref 0–200)
TSH SERPL DL<=0.05 MIU/L-ACNC: 1.61 UIU/ML (ref 0.27–4.2)
VITAMIN D 25-HYDROXY: 27.4 NG/ML (ref 30–100)
WBC # BLD: 5.6 K/UL (ref 4.8–10.8)

## 2018-08-07 PROCEDURE — G8419 CALC BMI OUT NRM PARAM NOF/U: HCPCS | Performed by: FAMILY MEDICINE

## 2018-08-07 PROCEDURE — 99213 OFFICE O/P EST LOW 20 MIN: CPT | Performed by: FAMILY MEDICINE

## 2018-08-07 PROCEDURE — 4004F PT TOBACCO SCREEN RCVD TLK: CPT | Performed by: FAMILY MEDICINE

## 2018-08-07 PROCEDURE — G8427 DOCREV CUR MEDS BY ELIG CLIN: HCPCS | Performed by: FAMILY MEDICINE

## 2018-08-07 RX ORDER — CYCLOBENZAPRINE HCL 10 MG
10 TABLET ORAL 3 TIMES DAILY PRN
Qty: 30 TABLET | Refills: 2 | Status: SHIPPED | OUTPATIENT
Start: 2018-08-07 | End: 2018-08-10 | Stop reason: SDUPTHER

## 2018-08-07 RX ORDER — PAROXETINE 10 MG/1
10 TABLET, FILM COATED ORAL EVERY EVENING
Qty: 30 TABLET | Refills: 3 | Status: SHIPPED | OUTPATIENT
Start: 2018-08-07 | End: 2018-08-24

## 2018-08-07 ASSESSMENT — PATIENT HEALTH QUESTIONNAIRE - PHQ9
1. LITTLE INTEREST OR PLEASURE IN DOING THINGS: 0
SUM OF ALL RESPONSES TO PHQ9 QUESTIONS 1 & 2: 0
2. FEELING DOWN, DEPRESSED OR HOPELESS: 0
SUM OF ALL RESPONSES TO PHQ QUESTIONS 1-9: 0

## 2018-08-07 NOTE — PROGRESS NOTES
Maternal Grandmother     Kidney Disease Maternal Grandmother     Other Maternal Grandmother     Cancer Paternal Grandfather      Social History     Social History    Marital status: Legally      Spouse name: N/A    Number of children: N/A    Years of education: N/A     Social History Main Topics    Smoking status: Current Some Day Smoker     Packs/day: 0.50     Years: 20.00     Types: Cigarettes     Start date: 1/19/1997    Smokeless tobacco: Never Used    Alcohol use No    Drug use: No    Sexual activity: Yes     Partners: Male     Other Topics Concern    None     Social History Narrative    None     Allergies   Allergen Reactions    Acyclovir And Related Swelling    Morphine Itching    Pcn [Penicillins] Anaphylaxis and Hives    Amoxicillin Hives and Swelling     Throat swelling    Cephalexin Hives    Ibuprofen Hives       Review of Systems:   General ROS: negative for - chills, fatigue, fever, malaise, weight gain or weight loss  Respiratory ROS: no cough, shortness of breath, or wheezing  Cardiovascular ROS: no chest pain or dyspnea on exertion  Gastrointestinal ROS: no abdominal pain, change in bowel habits, or black or bloody stools  Genito-Urinary ROS: no dysuria, trouble voiding  Musculoskeletal ROS: muscle cramps  Neurological ROS: negative for - behavioral changes, memory loss, numbness/tingling, tremors or weakness    In general patient otherwise reports feeling well. Physical Exam:  /64 (Site: Left Arm)   Pulse 60   Ht 4' 11.5\" (1.511 m)   Wt 91 lb 3.2 oz (41.4 kg)   SpO2 97%   Breastfeeding? No   BMI 18.11 kg/m²     Gen: Well, NAD, Alert, Oriented x 3   HEENT: EOMI, eyes clear, MMM  Skin: without rash or jaundice  Neck: no significant lymphadenopathy or thyromegaly  Lungs: CTA B w/out Rales/Wheezes/Rhonchi, Good respiratory effort   Heart: RRR, S1S2, w/out M/R/G, non-displaced PMI   Ext: No C/C/E Bilaterally.    Neuro: Neurovascularly intact w/ Sensory/Motor intact UE/LE Bilaterally. Psych: euthymic    Lab Results   Component Value Date    WBC 11.9 (H) 03/15/2018    HGB 9.5 (L) 03/16/2018    HCT 27.6 (L) 03/16/2018     03/15/2018    ALT 10 03/15/2018    AST 15 03/15/2018     03/15/2018    K 4.2 03/15/2018     03/15/2018    CREATININE 0.42 (L) 03/15/2018    BUN 8 03/15/2018    CO2 17 (L) 03/15/2018    INR 1.0 06/11/2014         A&P   Diagnosis Orders   1. Anxiety  TSH without Reflex    Comprehensive Metabolic Panel    CBC    PARoxetine (PAXIL) 10 MG tablet    Vitamin D 25 Hydroxy   2. Muscle cramps  cyclobenzaprine (FLEXERIL) 10 MG tablet   3. Underweight     4.  Screening, lipid  Lipid Panel     Labs as ordered  Trial of paxil    Continue counseling   comes to her house weekly            Lisa Flower MD

## 2018-08-10 ENCOUNTER — OFFICE VISIT (OUTPATIENT)
Dept: GASTROENTEROLOGY | Age: 28
End: 2018-08-10
Payer: COMMERCIAL

## 2018-08-10 VITALS
TEMPERATURE: 96.8 F | WEIGHT: 90.6 LBS | BODY MASS INDEX: 18.27 KG/M2 | HEIGHT: 59 IN | HEART RATE: 68 BPM | OXYGEN SATURATION: 98 %

## 2018-08-10 DIAGNOSIS — K58.0 IRRITABLE BOWEL SYNDROME WITH DIARRHEA: ICD-10-CM

## 2018-08-10 DIAGNOSIS — K59.1 FUNCTIONAL DIARRHEA: Primary | ICD-10-CM

## 2018-08-10 PROCEDURE — G8427 DOCREV CUR MEDS BY ELIG CLIN: HCPCS | Performed by: INTERNAL MEDICINE

## 2018-08-10 PROCEDURE — 4004F PT TOBACCO SCREEN RCVD TLK: CPT | Performed by: INTERNAL MEDICINE

## 2018-08-10 PROCEDURE — 99213 OFFICE O/P EST LOW 20 MIN: CPT | Performed by: INTERNAL MEDICINE

## 2018-08-10 PROCEDURE — G8419 CALC BMI OUT NRM PARAM NOF/U: HCPCS | Performed by: INTERNAL MEDICINE

## 2018-08-10 RX ORDER — DICYCLOMINE HYDROCHLORIDE 10 MG/1
10 CAPSULE ORAL
Qty: 120 CAPSULE | Refills: 3 | Status: SHIPPED | OUTPATIENT
Start: 2018-08-10 | End: 2019-11-05 | Stop reason: ALTCHOICE

## 2018-08-10 NOTE — PROGRESS NOTES
advised against it at this time given that she is pregnant. 1. Functional diarrhea  - Calprotectin Stoo, BENEFIBER  2. Blood in stool: CBC    Review of Systems   All other systems reviewed and are negative. Past medical history, past surgical history, medication list, social and family history reviewed    Pulse 68, temperature 96.8 °F (36 °C), height 4' 11\" (1.499 m), weight 90 lb 9.6 oz (41.1 kg), SpO2 98 %, not currently breastfeeding. Physical Exam   Constitutional: She is oriented to person, place, and time. She appears well-developed and well-nourished. No distress. Very thin, pale   Eyes: No scleral icterus. Cardiovascular: Normal rate and regular rhythm. Pulmonary/Chest: Effort normal and breath sounds normal.   Abdominal: Soft. Normal appearance and bowel sounds are normal. She exhibits no distension, no ascites and no mass. There is no hepatosplenomegaly. There is tenderness in the left lower quadrant. There is no rebound, no guarding and no CVA tenderness. Musculoskeletal: Normal range of motion. Lymphadenopathy:     She has no cervical adenopathy. Neurological: She is alert and oriented to person, place, and time. Psychiatric: She has a normal mood and affect. Her behavior is normal. Judgment and thought content normal.     Laboratory, Pathology, Radiology reviewed in detail with relevant important investigations summarized below:    Recent Labs      08/07/18   0939   WBC  5.6   HGB  13.9   HCT  41.0   MCV  95.4   PLT  206     Lab Results   Component Value Date    ALT 15 08/07/2018    AST 17 08/07/2018    ALKPHOS 46 08/07/2018    BILITOT 0.4 08/07/2018     Calprotectin: [id number]: Normal  Endoscopic investigations: None    Assessment and Plan:  Caryl Mendez 33 y.o. female for follow up. Patient with ongoing IBS D symptoms. Stool calprotectin was normal suggesting that there is no inflammatory component to her symptoms.   = Lifestyle modification with stress reduction, relaxation

## 2018-08-24 ENCOUNTER — OFFICE VISIT (OUTPATIENT)
Dept: OBGYN CLINIC | Age: 28
End: 2018-08-24

## 2018-08-24 VITALS
SYSTOLIC BLOOD PRESSURE: 96 MMHG | DIASTOLIC BLOOD PRESSURE: 64 MMHG | HEART RATE: 80 BPM | HEIGHT: 60 IN | WEIGHT: 91 LBS | BODY MASS INDEX: 17.87 KG/M2

## 2018-08-24 DIAGNOSIS — Z01.818 TUBAL LIGATION EVALUATION: ICD-10-CM

## 2018-08-24 DIAGNOSIS — R63.6 PATIENT UNDERWEIGHT: ICD-10-CM

## 2018-08-24 PROCEDURE — 0503F POSTPARTUM CARE VISIT: CPT | Performed by: OBSTETRICS & GYNECOLOGY

## 2018-08-24 NOTE — PROGRESS NOTES
Post Partum     Caryl Mendez is a 33 y.o. female [id number]    The patient was seen. She does not have a chief complaints today. She delivered Vaginal 5 mnth ago. She is not breast feeding and there is not any signs or symptoms of mastitis. The patient completed the E.P.D.S. Evaluation form and scored patient with diagnosed postpartum depression/ anxiety. She does have any signs or symptoms of post partum depression. She denies any suicidal thoughts with a plan, intent to harm others and delusional ideas. Today her lochia is light she denies any dizziness or shortness of breath. Her pregnancy was complicated by gestational diabetes. She does admit to having good home support. Vitals:  BP 96/64 (Site: Left Arm, Position: Sitting, Cuff Size: Medium Adult)   Pulse 80   Ht 4' 11.5\" (1.511 m)   Wt 91 lb (41.3 kg)   LMP 2018 (Approximate)   BMI 18.07 kg/m²   Allergies:  Acyclovir and related; Morphine; Pcn [penicillins];  Amoxicillin; Cephalexin; and Ibuprofen  Past Medical History:   Diagnosis Date    Anemia     Intractable migraine without aura and without status migrainosus 2017    Kidney stones     Postpartum depression 2018    Unspecified asthma(493.90)      Past Surgical History:   Procedure Laterality Date    OVARIAN CYST SURGERY      laparascopic sx    TONSILLECTOMY       OB History      Para Term  AB Living    5 4 3 1 1 4    SAB TAB Ectopic Molar Multiple Live Births    1 0 0   0 4        Family History   Problem Relation Age of Onset    Asthma Mother     High Blood Pressure Mother     Asthma Father     Heart Disease Maternal Grandmother     Asthma Maternal Grandmother     Diabetes Maternal Grandmother     Kidney Disease Maternal Grandmother     Other Maternal Grandmother     Cancer Paternal Grandfather      Social History     Social History    Marital status: Legally      Spouse name: N/A    Number of children: N/A    Years of index is 18.07 kg/m². Obesity:  Unnderweight  Smoking:  Yes    Plan:   Switch to zoloft 50 mg po QD due to associated anxiety amd noneffectiveness of paxil according to pATIENT. RETURN IN 4 WEEKS FOR MEDICATION CHECK . REFERRAL FOR TUBAL LIGATION     Obesity Counseling:  Given  Smoking Counseling:  Given    No orders of the defined types were placed in this encounter. Orders Placed This Encounter   Medications    sertraline (ZOLOFT) 50 MG tablet     Sig: Take 1 tablet by mouth daily     Dispense:  30 tablet     Refill:  1       1. Return to the office: Return in about 4 weeks (around 9/21/2018) for medication assessment. 2. Signs & Symptoms of mastitis reviewed; notify if occurs  3. Secondary smoke risks reviewed. Increased risks of respiratory problems, Sudden     infant death syndrome, and potential malignancies. 4. Family planning counseling and STD counseling completed       Trenton Vega M.D., F.A.C.O. G

## 2018-10-12 RX ORDER — OMEPRAZOLE 40 MG/1
CAPSULE, DELAYED RELEASE ORAL
Qty: 30 CAPSULE | Refills: 3 | Status: SHIPPED | OUTPATIENT
Start: 2018-10-12 | End: 2019-06-26 | Stop reason: SDUPTHER

## 2018-10-12 RX ORDER — FERROUS SULFATE 325(65) MG
TABLET ORAL
Qty: 30 TABLET | Refills: 3 | Status: ON HOLD | OUTPATIENT
Start: 2018-10-12 | End: 2019-12-11

## 2018-11-04 ENCOUNTER — APPOINTMENT (OUTPATIENT)
Dept: CT IMAGING | Age: 28
End: 2018-11-04
Payer: COMMERCIAL

## 2018-11-04 ENCOUNTER — APPOINTMENT (OUTPATIENT)
Dept: GENERAL RADIOLOGY | Age: 28
End: 2018-11-04
Payer: COMMERCIAL

## 2018-11-04 ENCOUNTER — HOSPITAL ENCOUNTER (EMERGENCY)
Age: 28
Discharge: HOME | End: 2018-11-04
Payer: COMMERCIAL

## 2018-11-04 VITALS
OXYGEN SATURATION: 99 % | WEIGHT: 90 LBS | HEART RATE: 70 BPM | HEIGHT: 59 IN | RESPIRATION RATE: 19 BRPM | TEMPERATURE: 98 F | SYSTOLIC BLOOD PRESSURE: 109 MMHG | BODY MASS INDEX: 18.14 KG/M2 | DIASTOLIC BLOOD PRESSURE: 72 MMHG

## 2018-11-04 DIAGNOSIS — R51.9 ACUTE NONINTRACTABLE HEADACHE, UNSPECIFIED HEADACHE TYPE: Primary | ICD-10-CM

## 2018-11-04 DIAGNOSIS — R06.02 SHORTNESS OF BREATH: ICD-10-CM

## 2018-11-04 DIAGNOSIS — R07.9 CHEST PAIN, UNSPECIFIED TYPE: ICD-10-CM

## 2018-11-04 LAB
ALBUMIN SERPL-MCNC: 4.6 G/DL (ref 3.9–4.9)
ALP BLD-CCNC: 48 U/L (ref 40–130)
ALT SERPL-CCNC: 13 U/L (ref 0–33)
AMPHETAMINE SCREEN, URINE: ABNORMAL
ANION GAP SERPL CALCULATED.3IONS-SCNC: 12 MEQ/L (ref 7–13)
AST SERPL-CCNC: 16 U/L (ref 0–35)
BARBITURATE SCREEN URINE: POSITIVE
BASOPHILS ABSOLUTE: 0.1 K/UL (ref 0–0.2)
BASOPHILS RELATIVE PERCENT: 0.7 %
BENZODIAZEPINE SCREEN, URINE: ABNORMAL
BILIRUB SERPL-MCNC: <0.2 MG/DL (ref 0–1.2)
BILIRUBIN URINE: NEGATIVE
BLOOD, URINE: NEGATIVE
BUN BLDV-MCNC: 8 MG/DL (ref 6–20)
CALCIUM SERPL-MCNC: 9.5 MG/DL (ref 8.6–10.2)
CANNABINOID SCREEN URINE: ABNORMAL
CHLORIDE BLD-SCNC: 103 MEQ/L (ref 98–107)
CHP ED QC CHECK: YES
CLARITY: CLEAR
CO2: 25 MEQ/L (ref 22–29)
COCAINE METABOLITE SCREEN URINE: ABNORMAL
COLOR: YELLOW
CREAT SERPL-MCNC: 0.58 MG/DL (ref 0.5–0.9)
EKG ATRIAL RATE: 67 BPM
EKG P AXIS: 64 DEGREES
EKG P-R INTERVAL: 108 MS
EKG Q-T INTERVAL: 430 MS
EKG QRS DURATION: 96 MS
EKG QTC CALCULATION (BAZETT): 454 MS
EKG R AXIS: 62 DEGREES
EKG T AXIS: 19 DEGREES
EKG VENTRICULAR RATE: 67 BPM
EOSINOPHILS ABSOLUTE: 0.2 K/UL (ref 0–0.7)
EOSINOPHILS RELATIVE PERCENT: 2.2 %
GFR AFRICAN AMERICAN: >60
GFR NON-AFRICAN AMERICAN: >60
GLOBULIN: 2.8 G/DL (ref 2.3–3.5)
GLUCOSE BLD-MCNC: 106 MG/DL (ref 74–109)
GLUCOSE URINE: NEGATIVE MG/DL
HCT VFR BLD CALC: 40.8 % (ref 37–47)
HEMOGLOBIN: 14.1 G/DL (ref 12–16)
KETONES, URINE: NEGATIVE MG/DL
LEUKOCYTE ESTERASE, URINE: NEGATIVE
LIPASE: 31 U/L (ref 13–60)
LYMPHOCYTES ABSOLUTE: 3.5 K/UL (ref 1–4.8)
LYMPHOCYTES RELATIVE PERCENT: 43.9 %
Lab: ABNORMAL
MCH RBC QN AUTO: 32.8 PG (ref 27–31.3)
MCHC RBC AUTO-ENTMCNC: 34.5 % (ref 33–37)
MCV RBC AUTO: 95.2 FL (ref 82–100)
MONOCYTES ABSOLUTE: 0.5 K/UL (ref 0.2–0.8)
MONOCYTES RELATIVE PERCENT: 6.6 %
NEUTROPHILS ABSOLUTE: 3.7 K/UL (ref 1.4–6.5)
NEUTROPHILS RELATIVE PERCENT: 46.6 %
NITRITE, URINE: NEGATIVE
OPIATE SCREEN URINE: ABNORMAL
PDW BLD-RTO: 12.7 % (ref 11.5–14.5)
PH UA: 6.5 (ref 5–9)
PHENCYCLIDINE SCREEN URINE: ABNORMAL
PLATELET # BLD: 199 K/UL (ref 130–400)
POTASSIUM SERPL-SCNC: 4.4 MEQ/L (ref 3.5–5.1)
PREGNANCY TEST URINE, POC: NEGATIVE
PROTEIN UA: NEGATIVE MG/DL
RBC # BLD: 4.29 M/UL (ref 4.2–5.4)
SODIUM BLD-SCNC: 140 MEQ/L (ref 132–144)
SPECIFIC GRAVITY UA: 1.02 (ref 1–1.03)
TOTAL PROTEIN: 7.4 G/DL (ref 6.4–8.1)
TROPONIN: <0.01 NG/ML (ref 0–0.01)
URINE REFLEX TO CULTURE: NORMAL
UROBILINOGEN, URINE: 1 E.U./DL
WBC # BLD: 8 K/UL (ref 4.8–10.8)

## 2018-11-04 PROCEDURE — 80307 DRUG TEST PRSMV CHEM ANLYZR: CPT

## 2018-11-04 PROCEDURE — 71045 X-RAY EXAM CHEST 1 VIEW: CPT

## 2018-11-04 PROCEDURE — 96365 THER/PROPH/DIAG IV INF INIT: CPT

## 2018-11-04 PROCEDURE — 96375 TX/PRO/DX INJ NEW DRUG ADDON: CPT

## 2018-11-04 PROCEDURE — 70450 CT HEAD/BRAIN W/O DYE: CPT

## 2018-11-04 PROCEDURE — 80053 COMPREHEN METABOLIC PANEL: CPT

## 2018-11-04 PROCEDURE — 83690 ASSAY OF LIPASE: CPT

## 2018-11-04 PROCEDURE — 36415 COLL VENOUS BLD VENIPUNCTURE: CPT

## 2018-11-04 PROCEDURE — 84484 ASSAY OF TROPONIN QUANT: CPT

## 2018-11-04 PROCEDURE — 6360000002 HC RX W HCPCS: Performed by: PERSONAL EMERGENCY RESPONSE ATTENDANT

## 2018-11-04 PROCEDURE — 93005 ELECTROCARDIOGRAM TRACING: CPT

## 2018-11-04 PROCEDURE — 2580000003 HC RX 258: Performed by: PERSONAL EMERGENCY RESPONSE ATTENDANT

## 2018-11-04 PROCEDURE — 81003 URINALYSIS AUTO W/O SCOPE: CPT

## 2018-11-04 PROCEDURE — 99284 EMERGENCY DEPT VISIT MOD MDM: CPT

## 2018-11-04 PROCEDURE — 85025 COMPLETE CBC W/AUTO DIFF WBC: CPT

## 2018-11-04 PROCEDURE — 6370000000 HC RX 637 (ALT 250 FOR IP): Performed by: PERSONAL EMERGENCY RESPONSE ATTENDANT

## 2018-11-04 PROCEDURE — 94640 AIRWAY INHALATION TREATMENT: CPT

## 2018-11-04 RX ORDER — IPRATROPIUM BROMIDE AND ALBUTEROL SULFATE 2.5; .5 MG/3ML; MG/3ML
1 SOLUTION RESPIRATORY (INHALATION) CONTINUOUS PRN
Status: DISCONTINUED | OUTPATIENT
Start: 2018-11-04 | End: 2018-11-05 | Stop reason: HOSPADM

## 2018-11-04 RX ORDER — ONDANSETRON 2 MG/ML
4 INJECTION INTRAMUSCULAR; INTRAVENOUS ONCE
Status: COMPLETED | OUTPATIENT
Start: 2018-11-04 | End: 2018-11-04

## 2018-11-04 RX ORDER — LORAZEPAM 2 MG/ML
1 INJECTION INTRAMUSCULAR ONCE
Status: COMPLETED | OUTPATIENT
Start: 2018-11-04 | End: 2018-11-04

## 2018-11-04 RX ORDER — MAGNESIUM SULFATE IN WATER 40 MG/ML
2 INJECTION, SOLUTION INTRAVENOUS ONCE
Status: COMPLETED | OUTPATIENT
Start: 2018-11-04 | End: 2018-11-04

## 2018-11-04 RX ORDER — FENTANYL CITRATE 50 UG/ML
50 INJECTION, SOLUTION INTRAMUSCULAR; INTRAVENOUS ONCE
Status: COMPLETED | OUTPATIENT
Start: 2018-11-04 | End: 2018-11-04

## 2018-11-04 RX ORDER — DEXAMETHASONE SODIUM PHOSPHATE 4 MG/ML
4 INJECTION, SOLUTION INTRA-ARTICULAR; INTRALESIONAL; INTRAMUSCULAR; INTRAVENOUS; SOFT TISSUE ONCE
Status: COMPLETED | OUTPATIENT
Start: 2018-11-04 | End: 2018-11-04

## 2018-11-04 RX ORDER — 0.9 % SODIUM CHLORIDE 0.9 %
1000 INTRAVENOUS SOLUTION INTRAVENOUS ONCE
Status: COMPLETED | OUTPATIENT
Start: 2018-11-04 | End: 2018-11-04

## 2018-11-04 RX ADMIN — LORAZEPAM 1 MG: 2 INJECTION INTRAMUSCULAR; INTRAVENOUS at 21:34

## 2018-11-04 RX ADMIN — IPRATROPIUM BROMIDE AND ALBUTEROL SULFATE 1 AMPULE: .5; 3 SOLUTION RESPIRATORY (INHALATION) at 20:29

## 2018-11-04 RX ADMIN — DEXAMETHASONE SODIUM PHOSPHATE 4 MG: 4 INJECTION, SOLUTION INTRAMUSCULAR; INTRAVENOUS at 20:58

## 2018-11-04 RX ADMIN — MAGNESIUM SULFATE HEPTAHYDRATE 2 G: 40 INJECTION, SOLUTION INTRAVENOUS at 20:57

## 2018-11-04 RX ADMIN — SODIUM CHLORIDE 1000 ML: 9 INJECTION, SOLUTION INTRAVENOUS at 20:57

## 2018-11-04 RX ADMIN — ONDANSETRON 4 MG: 2 INJECTION INTRAMUSCULAR; INTRAVENOUS at 20:58

## 2018-11-04 RX ADMIN — FENTANYL CITRATE 50 MCG: 50 INJECTION, SOLUTION INTRAMUSCULAR; INTRAVENOUS at 22:38

## 2018-11-04 ASSESSMENT — ENCOUNTER SYMPTOMS
ABDOMINAL PAIN: 0
RHINORRHEA: 0
COUGH: 0
COLOR CHANGE: 0
BACK PAIN: 1
SHORTNESS OF BREATH: 1
NAUSEA: 1
DIARRHEA: 0
BLOOD IN STOOL: 0
VOMITING: 0
SORE THROAT: 0
CHEST TIGHTNESS: 1

## 2018-11-04 ASSESSMENT — PAIN DESCRIPTION - LOCATION: LOCATION: BACK;CHEST;HEAD

## 2018-11-04 ASSESSMENT — PAIN DESCRIPTION - PAIN TYPE: TYPE: ACUTE PAIN

## 2018-11-04 ASSESSMENT — PAIN SCALES - GENERAL
PAINLEVEL_OUTOF10: 8
PAINLEVEL_OUTOF10: 7

## 2018-11-04 ASSESSMENT — PULMONARY FUNCTION TESTS: PEFR_L/MIN: 150

## 2018-11-04 ASSESSMENT — PAIN DESCRIPTION - DESCRIPTORS: DESCRIPTORS: ACHING;CONSTANT;SHARP

## 2018-11-04 ASSESSMENT — PAIN DESCRIPTION - FREQUENCY: FREQUENCY: CONTINUOUS

## 2018-11-05 ENCOUNTER — TELEPHONE (OUTPATIENT)
Dept: FAMILY MEDICINE CLINIC | Age: 28
End: 2018-11-05

## 2018-11-05 DIAGNOSIS — R51.9 INTRACTABLE HEADACHE, UNSPECIFIED CHRONICITY PATTERN, UNSPECIFIED HEADACHE TYPE: Primary | ICD-10-CM

## 2018-11-05 PROCEDURE — 93010 ELECTROCARDIOGRAM REPORT: CPT | Performed by: INTERNAL MEDICINE

## 2018-11-05 NOTE — ED TRIAGE NOTES
Pt c/o a headache and back pain that radiates into her back for the past 3 days, Pt is A&OX3, calm, ambulatory, afebrile, breathes are equal and unlabored, Pt denies trauma and SOB.

## 2018-11-05 NOTE — ED PROVIDER NOTES
[address] [facility] ED  eMERGENCY dEPARTMENT eNCOUnter      Pt Name: Castro Smith  MRN: [id number]  Stephaniegfrocky 1990  Date of evaluation: 11/4/2018  Provider: Yary Rogers, [id number] [place] [address] Drive is a 33 y.o. female with PMHx of anemia, migraine, asthma presents to the emergency department with multiple complaints. She states for 1 month she has had headaches every day, all day. She states is atypical migraine for her. She has pain in the right frontal aspect of her head. She was supposed to have a family doctor appointment to be referred to neurology but due to car problems missed her appointment. She is doing Fioricet, Excedrin, Motrin, Tylenol home with no relief. She takes Sudafed today with no relief. She states when headaches get worse she gets bilateral blurred and double vision and she has dizziness as well. Dizziness is present with changes of positioning or head rotation. She also complains of chest pressure that she has had over the past week and has not followed up with cardiology at. She states it has been worse over the past 3 days. She states pain radiates from her back to her chest.  She gets short of breath. Chest pain is worse with breathing or walking too fast.  It is relieved with rest.  She has been nauseous with no vomiting. She denies fevers, upper respiratory symptoms, diarrhea, urinary symptoms, ill contacts. She denies alcohol or drug use. HPI    Nursing Notes were reviewed. REVIEW OF SYSTEMS       Review of Systems   Constitutional: Negative for appetite change, chills and fever. HENT: Negative for congestion, rhinorrhea and sore throat. Eyes: Positive for visual disturbance. Respiratory: Positive for chest tightness and shortness of breath. Negative for cough. Cardiovascular: Positive for chest pain. Gastrointestinal: Positive for nausea. Negative for abdominal pain, blood in stool, diarrhea and vomiting. Grandmother     Diabetes Maternal Grandmother     Kidney Disease Maternal Grandmother     Other Maternal Grandmother     Cancer Paternal Grandfather           SOCIAL HISTORY       Social History     Social History    Marital status: Legally      Spouse name: N/A    Number of children: N/A    Years of education: N/A     Social History Main Topics    Smoking status: Current Some Day Smoker     Packs/day: 0.50     Years: 20.00     Types: Cigarettes     Start date: 1/19/1997    Smokeless tobacco: Never Used    Alcohol use No    Drug use: No    Sexual activity: Yes     Partners: Male     Other Topics Concern    Not on file     Social History Narrative    No narrative on file         PHYSICAL EXAM         ED Triage Vitals [11/04/18 1959]   BP Temp Temp Source Pulse Resp SpO2 Height Weight   120/80 97.3 °F (36.3 °C) Oral 76 17 96 % 4' 11\" (1.499 m) 90 lb (40.8 kg)       Physical Exam   Constitutional: She is oriented to person, place, and time. She appears well-developed and well-nourished. HENT:   Head: Normocephalic and atraumatic. Right Ear: External ear normal.   Left Ear: External ear normal.   Mouth/Throat: Oropharynx is clear and moist.   Eyes: Pupils are equal, round, and reactive to light. Conjunctivae and EOM are normal.   Neck: Normal range of motion. Neck supple. No tracheal deviation present. Cardiovascular: Normal heart sounds and intact distal pulses. Pulmonary/Chest: Effort normal and breath sounds normal. No stridor. No respiratory distress. She exhibits tenderness. Minimal anterior chest tenderness to palpation   Abdominal: Soft. Bowel sounds are normal. She exhibits no distension and no mass. There is no tenderness. There is no rebound and no guarding. Musculoskeletal: Normal range of motion. No back tenderness to palpation, no midline spinous process tenderness   Neurological: She is alert and oriented to person, place, and time. She has normal reflexes.    No

## 2018-11-14 RX ORDER — AZITHROMYCIN 250 MG/1
TABLET, FILM COATED ORAL
Qty: 1 PACKET | Refills: 0 | Status: SHIPPED | OUTPATIENT
Start: 2018-11-14 | End: 2018-11-24

## 2019-01-22 ENCOUNTER — HOSPITAL ENCOUNTER (OUTPATIENT)
Dept: NEUROLOGY | Age: 29
Discharge: HOME | End: 2019-01-22
Payer: COMMERCIAL

## 2019-01-22 ENCOUNTER — HOSPITAL ENCOUNTER (OUTPATIENT)
Dept: MRI IMAGING | Age: 29
Discharge: HOME | End: 2019-01-24
Payer: COMMERCIAL

## 2019-01-22 DIAGNOSIS — R93.89 ABNORMAL CT SCAN: ICD-10-CM

## 2019-01-22 DIAGNOSIS — R51.9 INTRACTABLE HEADACHE, UNSPECIFIED CHRONICITY PATTERN, UNSPECIFIED HEADACHE TYPE: ICD-10-CM

## 2019-01-22 PROCEDURE — 6360000004 HC RX CONTRAST MEDICATION: Performed by: PSYCHIATRY & NEUROLOGY

## 2019-01-22 PROCEDURE — 95816 EEG AWAKE AND DROWSY: CPT

## 2019-01-22 PROCEDURE — A9579 GAD-BASE MR CONTRAST NOS,1ML: HCPCS | Performed by: PSYCHIATRY & NEUROLOGY

## 2019-01-22 PROCEDURE — 70553 MRI BRAIN STEM W/O & W/DYE: CPT

## 2019-01-22 RX ADMIN — GADOTERIDOL 9 ML: 279.3 INJECTION, SOLUTION INTRAVENOUS at 17:56

## 2019-02-13 ENCOUNTER — TELEPHONE (OUTPATIENT)
Dept: FAMILY MEDICINE CLINIC | Age: 29
End: 2019-02-13

## 2019-02-13 RX ORDER — AZITHROMYCIN 250 MG/1
TABLET, FILM COATED ORAL
Qty: 1 PACKET | Refills: 0 | Status: SHIPPED | OUTPATIENT
Start: 2019-02-13 | End: 2019-02-23

## 2019-04-23 ENCOUNTER — TELEPHONE (OUTPATIENT)
Dept: OBGYN CLINIC | Age: 29
End: 2019-04-23

## 2019-04-24 RX ORDER — FLUCONAZOLE 150 MG/1
TABLET ORAL
Qty: 3 TABLET | Refills: 0 | Status: SHIPPED | OUTPATIENT
Start: 2019-04-24 | End: 2019-10-02 | Stop reason: ALTCHOICE

## 2019-09-23 ENCOUNTER — OFFICE VISIT (OUTPATIENT)
Dept: OBGYN CLINIC | Age: 29
End: 2019-09-23
Payer: COMMERCIAL

## 2019-09-23 VITALS
DIASTOLIC BLOOD PRESSURE: 66 MMHG | WEIGHT: 95 LBS | HEART RATE: 76 BPM | BODY MASS INDEX: 19.15 KG/M2 | SYSTOLIC BLOOD PRESSURE: 98 MMHG | HEIGHT: 59 IN

## 2019-09-23 DIAGNOSIS — N93.9 VAGINAL BLEEDING: ICD-10-CM

## 2019-09-23 DIAGNOSIS — N92.6 MISSED PERIODS: ICD-10-CM

## 2019-09-23 DIAGNOSIS — N92.6 MISSED PERIODS: Primary | ICD-10-CM

## 2019-09-23 LAB — GONADOTROPIN, CHORIONIC (HCG) QUANT: <0.1 MIU/ML

## 2019-09-23 PROCEDURE — G8428 CUR MEDS NOT DOCUMENT: HCPCS | Performed by: OBSTETRICS & GYNECOLOGY

## 2019-09-23 PROCEDURE — G8420 CALC BMI NORM PARAMETERS: HCPCS | Performed by: OBSTETRICS & GYNECOLOGY

## 2019-09-23 PROCEDURE — 4004F PT TOBACCO SCREEN RCVD TLK: CPT | Performed by: OBSTETRICS & GYNECOLOGY

## 2019-09-23 PROCEDURE — 99213 OFFICE O/P EST LOW 20 MIN: CPT | Performed by: OBSTETRICS & GYNECOLOGY

## 2019-09-23 RX ORDER — PNV NO.95/FERROUS FUM/FOLIC AC 28MG-0.8MG
1 TABLET ORAL DAILY
Qty: 60 TABLET | Refills: 3 | Status: SHIPPED | OUTPATIENT
Start: 2019-09-23 | End: 2019-10-02 | Stop reason: ALTCHOICE

## 2019-09-23 NOTE — PROGRESS NOTES
Kevin Clay is a 26 y.o. female who presents here today for complaints of absent menses. Urine pregnancy test at home positive, patient does mention an episode of vaginal bleeding after which the urine pregnancy test were negative. But patient still does not have regular menstrual cycles and she is concerned about still possibly being pregnant. Patient is not on birth control, and is sexually active. Vitals:  BP 98/66 (Site: Right Upper Arm, Position: Sitting, Cuff Size: Small Adult)   Pulse 76   Ht 4' 11\" (1.499 m)   Wt 95 lb (43.1 kg)   LMP 2019 (Approximate)   BMI 19.19 kg/m²   Allergies:  Acyclovir and related; Morphine; Pcn [penicillins];  Amoxicillin; Cephalexin; and Ibuprofen  Past Medical History:   Diagnosis Date    Anemia     Intractable migraine without aura and without status migrainosus 2017    Kidney stones     Postpartum depression 2018    Unspecified asthma(493.90)      Past Surgical History:   Procedure Laterality Date    OVARIAN CYST SURGERY      laparascopic sx    TONSILLECTOMY       OB History        5    Para   4    Term   3       1    AB   1    Living   4       SAB   1    TAB   0    Ectopic   0    Molar        Multiple   0    Live Births   4              Family History   Problem Relation Age of Onset    Asthma Mother     High Blood Pressure Mother     Asthma Father     Heart Disease Maternal Grandmother     Asthma Maternal Grandmother     Diabetes Maternal Grandmother     Kidney Disease Maternal Grandmother     Other Maternal Grandmother     Cancer Paternal Grandfather      Social History     Socioeconomic History    Marital status: Legally      Spouse name: Not on file    Number of children: Not on file    Years of education: Not on file    Highest education level: Not on file   Occupational History    Not on file   Social Needs    Financial resource strain: Not on file    Food insecurity:     Worry: Not on prenatal vitamins      Orders Placed This Encounter   Procedures    HCG, Quantitative, Pregnancy     Standing Status:   Future     Number of Occurrences:   1     Standing Expiration Date:   9/22/2020     Orders Placed This Encounter   Medications    Prenatal Vit-Fe Fumarate-FA (PRENATAL VITAMIN AND MINERAL) 28-0.8 MG TABS     Sig: Take 1 tablet by mouth daily     Dispense:  60 tablet     Refill:  3     Patient was seen with total face to face time of 15 minutes. More than 50% of this visit was counseling and education regarding The primary encounter diagnosis was Missed periods. A diagnosis of Vaginal bleeding was also pertinent to this visit. and Other (Unsure if she's pregnant. LMP approximately 8/20/19)   as well as  counseling on preventative health maintenance follow-up. Follow Up:  Return in about 1 week (around 9/30/2019) for F/U for results, medication assessment.         Consuelo Yeager MD

## 2019-10-02 ENCOUNTER — OFFICE VISIT (OUTPATIENT)
Dept: FAMILY MEDICINE CLINIC | Age: 29
End: 2019-10-02
Payer: COMMERCIAL

## 2019-10-02 VITALS
TEMPERATURE: 97.7 F | SYSTOLIC BLOOD PRESSURE: 110 MMHG | WEIGHT: 95 LBS | BODY MASS INDEX: 18.65 KG/M2 | RESPIRATION RATE: 16 BRPM | OXYGEN SATURATION: 99 % | HEIGHT: 60 IN | DIASTOLIC BLOOD PRESSURE: 60 MMHG | HEART RATE: 71 BPM

## 2019-10-02 DIAGNOSIS — L72.9 SKIN CYST: Primary | ICD-10-CM

## 2019-10-02 DIAGNOSIS — F17.200 SMOKER: ICD-10-CM

## 2019-10-02 PROBLEM — Z3A.23 23 WEEKS GESTATION OF PREGNANCY: Status: RESOLVED | Noted: 2017-11-24 | Resolved: 2019-10-02

## 2019-10-02 PROBLEM — O98.811 CHLAMYDIA INFECTION AFFECTING PREGNANCY IN FIRST TRIMESTER: Status: RESOLVED | Noted: 2017-10-27 | Resolved: 2019-10-02

## 2019-10-02 PROBLEM — A74.9 CHLAMYDIA INFECTION AFFECTING PREGNANCY: Status: RESOLVED | Noted: 2017-09-25 | Resolved: 2019-10-02

## 2019-10-02 PROBLEM — O98.819 CHLAMYDIA INFECTION AFFECTING PREGNANCY: Status: RESOLVED | Noted: 2017-09-25 | Resolved: 2019-10-02

## 2019-10-02 PROBLEM — O26.13 LOW MATERNAL WEIGHT GAIN, THIRD TRIMESTER: Status: RESOLVED | Noted: 2018-02-13 | Resolved: 2019-10-02

## 2019-10-02 PROBLEM — A74.9 CHLAMYDIA INFECTION AFFECTING PREGNANCY IN FIRST TRIMESTER: Status: RESOLVED | Noted: 2017-10-27 | Resolved: 2019-10-02

## 2019-10-02 PROBLEM — Z78.9 ADMITTED TO LABOR AND DELIVERY: Status: RESOLVED | Noted: 2018-03-15 | Resolved: 2019-10-02

## 2019-10-02 PROBLEM — O26.842 FUNDAL HEIGHT LOW FOR DATES IN SECOND TRIMESTER: Status: RESOLVED | Noted: 2018-02-09 | Resolved: 2019-10-02

## 2019-10-02 PROBLEM — O40.3XX0 POLYHYDRAMNIOS AFFECTING PREGNANCY IN THIRD TRIMESTER: Status: RESOLVED | Noted: 2018-02-12 | Resolved: 2019-10-02

## 2019-10-02 PROBLEM — O26.843 FUNDAL HEIGHT LOW FOR DATES IN THIRD TRIMESTER: Status: RESOLVED | Noted: 2018-02-13 | Resolved: 2019-10-02

## 2019-10-02 PROBLEM — Z34.82 ENCOUNTER FOR SUPERVISION OF OTHER NORMAL PREGNANCY, SECOND TRIMESTER: Status: RESOLVED | Noted: 2017-09-25 | Resolved: 2019-10-02

## 2019-10-02 PROCEDURE — G8484 FLU IMMUNIZE NO ADMIN: HCPCS | Performed by: FAMILY MEDICINE

## 2019-10-02 PROCEDURE — 4004F PT TOBACCO SCREEN RCVD TLK: CPT | Performed by: FAMILY MEDICINE

## 2019-10-02 PROCEDURE — 99213 OFFICE O/P EST LOW 20 MIN: CPT | Performed by: FAMILY MEDICINE

## 2019-10-02 PROCEDURE — G8427 DOCREV CUR MEDS BY ELIG CLIN: HCPCS | Performed by: FAMILY MEDICINE

## 2019-10-02 PROCEDURE — G8420 CALC BMI NORM PARAMETERS: HCPCS | Performed by: FAMILY MEDICINE

## 2019-10-02 RX ORDER — LORATADINE AND PSEUDOEPHEDRINE 10; 240 MG/1; MG/1
1 TABLET, EXTENDED RELEASE ORAL DAILY PRN
COMMUNITY
Start: 2019-04-04 | End: 2019-11-05 | Stop reason: ALTCHOICE

## 2019-10-02 RX ORDER — NORTRIPTYLINE HYDROCHLORIDE 10 MG/1
3 CAPSULE ORAL NIGHTLY
COMMUNITY
Start: 2019-07-02 | End: 2020-01-17 | Stop reason: ALTCHOICE

## 2019-11-01 ENCOUNTER — TELEPHONE (OUTPATIENT)
Dept: OBGYN CLINIC | Age: 29
End: 2019-11-01

## 2019-11-04 RX ORDER — ONDANSETRON HYDROCHLORIDE 8 MG/1
TABLET, FILM COATED ORAL
Qty: 21 TABLET | Refills: 2 | Status: SHIPPED | OUTPATIENT
Start: 2019-11-04 | End: 2019-11-05 | Stop reason: ALTCHOICE

## 2019-11-05 ENCOUNTER — HOSPITAL ENCOUNTER (EMERGENCY)
Age: 29
Discharge: HOME | End: 2019-11-05
Attending: STUDENT IN AN ORGANIZED HEALTH CARE EDUCATION/TRAINING PROGRAM
Payer: COMMERCIAL

## 2019-11-05 VITALS
OXYGEN SATURATION: 100 % | HEIGHT: 59 IN | BODY MASS INDEX: 18.95 KG/M2 | WEIGHT: 94 LBS | TEMPERATURE: 98.2 F | RESPIRATION RATE: 17 BRPM | HEART RATE: 88 BPM | SYSTOLIC BLOOD PRESSURE: 125 MMHG | DIASTOLIC BLOOD PRESSURE: 87 MMHG

## 2019-11-05 DIAGNOSIS — O23.41 URINARY TRACT INFECTION IN MOTHER DURING FIRST TRIMESTER OF PREGNANCY: Primary | ICD-10-CM

## 2019-11-05 LAB
BACTERIA: ABNORMAL /HPF
BILIRUBIN URINE: NEGATIVE
BLOOD, URINE: NEGATIVE
CLARITY: CLEAR
COLOR: YELLOW
EPITHELIAL CELLS, UA: ABNORMAL /HPF (ref 0–5)
GLUCOSE URINE: NEGATIVE MG/DL
HCG, URINE, POC: POSITIVE
HYALINE CASTS: ABNORMAL /HPF (ref 0–5)
KETONES, URINE: NEGATIVE MG/DL
LEUKOCYTE ESTERASE, URINE: ABNORMAL
Lab: NORMAL
NEGATIVE QC PASS/FAIL: NORMAL
NITRITE, URINE: NEGATIVE
PH UA: 6.5 (ref 5–9)
POSITIVE QC PASS/FAIL: NORMAL
PROTEIN UA: NEGATIVE MG/DL
RBC UA: ABNORMAL /HPF (ref 0–5)
SPECIFIC GRAVITY UA: 1.01 (ref 1–1.03)
URINE REFLEX TO CULTURE: YES
UROBILINOGEN, URINE: 0.2 E.U./DL
WBC UA: ABNORMAL /HPF (ref 0–5)

## 2019-11-05 PROCEDURE — 81001 URINALYSIS AUTO W/SCOPE: CPT

## 2019-11-05 PROCEDURE — 99284 EMERGENCY DEPT VISIT MOD MDM: CPT

## 2019-11-05 PROCEDURE — 87086 URINE CULTURE/COLONY COUNT: CPT

## 2019-11-05 PROCEDURE — 6370000000 HC RX 637 (ALT 250 FOR IP): Performed by: STUDENT IN AN ORGANIZED HEALTH CARE EDUCATION/TRAINING PROGRAM

## 2019-11-05 RX ORDER — ACETAMINOPHEN 500 MG
1000 TABLET ORAL EVERY 6 HOURS PRN
Qty: 120 TABLET | Refills: 0 | Status: SHIPPED | OUTPATIENT
Start: 2019-11-05 | End: 2020-06-29

## 2019-11-05 RX ORDER — PHENAZOPYRIDINE HYDROCHLORIDE 200 MG/1
200 TABLET, FILM COATED ORAL 3 TIMES DAILY PRN
Qty: 6 TABLET | Refills: 0 | Status: SHIPPED | OUTPATIENT
Start: 2019-11-05 | End: 2019-11-08

## 2019-11-05 RX ORDER — PHENAZOPYRIDINE HYDROCHLORIDE 200 MG/1
200 TABLET, FILM COATED ORAL ONCE
Status: COMPLETED | OUTPATIENT
Start: 2019-11-05 | End: 2019-11-05

## 2019-11-05 RX ORDER — PHENAZOPYRIDINE HYDROCHLORIDE 200 MG/1
200 TABLET, FILM COATED ORAL ONCE
Status: DISCONTINUED | OUTPATIENT
Start: 2019-11-05 | End: 2019-11-05

## 2019-11-05 RX ORDER — NITROFURANTOIN 25; 75 MG/1; MG/1
100 CAPSULE ORAL ONCE
Status: COMPLETED | OUTPATIENT
Start: 2019-11-05 | End: 2019-11-05

## 2019-11-05 RX ORDER — NITROFURANTOIN 25; 75 MG/1; MG/1
100 CAPSULE ORAL 2 TIMES DAILY
Qty: 14 CAPSULE | Refills: 0 | Status: SHIPPED | OUTPATIENT
Start: 2019-11-05 | End: 2019-11-12

## 2019-11-05 RX ADMIN — NITROFURANTOIN (MONOHYDRATE/MACROCRYSTALS) 100 MG: 75; 25 CAPSULE ORAL at 17:04

## 2019-11-05 RX ADMIN — PHENAZOPYRIDINE 200 MG: 200 TABLET ORAL at 16:42

## 2019-11-05 ASSESSMENT — ENCOUNTER SYMPTOMS
NAUSEA: 0
VOMITING: 0
TROUBLE SWALLOWING: 0
SINUS PRESSURE: 0
BACK PAIN: 0
ABDOMINAL PAIN: 0
DIARRHEA: 0
COUGH: 0
SHORTNESS OF BREATH: 0
CHEST TIGHTNESS: 0

## 2019-11-05 ASSESSMENT — PAIN DESCRIPTION - ONSET: ONSET: ON-GOING

## 2019-11-05 ASSESSMENT — PAIN DESCRIPTION - LOCATION: LOCATION: FLANK

## 2019-11-05 ASSESSMENT — PAIN SCALES - GENERAL: PAINLEVEL_OUTOF10: 5

## 2019-11-05 ASSESSMENT — PAIN DESCRIPTION - FREQUENCY: FREQUENCY: CONTINUOUS

## 2019-11-05 ASSESSMENT — PAIN DESCRIPTION - PAIN TYPE: TYPE: ACUTE PAIN

## 2019-11-05 ASSESSMENT — PAIN DESCRIPTION - DESCRIPTORS: DESCRIPTORS: DULL;BURNING

## 2019-11-05 ASSESSMENT — PAIN DESCRIPTION - ORIENTATION: ORIENTATION: LEFT

## 2019-11-07 ENCOUNTER — TELEPHONE (OUTPATIENT)
Dept: OBGYN CLINIC | Age: 29
End: 2019-11-07

## 2019-11-07 DIAGNOSIS — K59.1 FUNCTIONAL DIARRHEA: ICD-10-CM

## 2019-11-07 LAB — URINE CULTURE, ROUTINE: NORMAL

## 2019-11-07 RX ORDER — WHEAT DEXTRIN 3 G/3.8 G
15 POWDER (GRAM) ORAL DAILY
Qty: 1 CAN | Refills: 3 | Status: SHIPPED | OUTPATIENT
Start: 2019-11-07 | End: 2020-01-02 | Stop reason: ALTCHOICE

## 2019-11-08 RX ORDER — POLYETHYLENE GLYCOL 3350 17 G/17G
17 POWDER, FOR SOLUTION ORAL DAILY PRN
Qty: 510 G | Refills: 0 | Status: SHIPPED | OUTPATIENT
Start: 2019-11-08 | End: 2019-12-08

## 2019-11-20 ENCOUNTER — TELEPHONE (OUTPATIENT)
Dept: OBGYN CLINIC | Age: 29
End: 2019-11-20

## 2019-11-21 ENCOUNTER — TELEPHONE (OUTPATIENT)
Dept: OBGYN CLINIC | Age: 29
End: 2019-11-21

## 2019-11-22 ENCOUNTER — OFFICE VISIT (OUTPATIENT)
Dept: OBGYN CLINIC | Age: 29
End: 2019-11-22
Payer: COMMERCIAL

## 2019-11-22 VITALS
HEIGHT: 59 IN | WEIGHT: 100 LBS | HEART RATE: 98 BPM | DIASTOLIC BLOOD PRESSURE: 62 MMHG | BODY MASS INDEX: 20.16 KG/M2 | SYSTOLIC BLOOD PRESSURE: 98 MMHG

## 2019-11-22 DIAGNOSIS — N91.2 AMENORRHEA: ICD-10-CM

## 2019-11-22 DIAGNOSIS — N91.2 AMENORRHEA: Primary | ICD-10-CM

## 2019-11-22 LAB
AMPHETAMINE SCREEN, URINE: NORMAL
BACTERIA: ABNORMAL /HPF
BARBITURATE SCREEN URINE: NORMAL
BASOPHILS ABSOLUTE: 0 K/UL (ref 0–0.2)
BASOPHILS RELATIVE PERCENT: 0.5 %
BENZODIAZEPINE SCREEN, URINE: NORMAL
BILIRUBIN URINE: NEGATIVE
BLOOD, URINE: NEGATIVE
CANNABINOID SCREEN URINE: NORMAL
CLARITY: ABNORMAL
COCAINE METABOLITE SCREEN URINE: NORMAL
COLOR: ABNORMAL
EOSINOPHILS ABSOLUTE: 0.1 K/UL (ref 0–0.7)
EOSINOPHILS RELATIVE PERCENT: 1 %
EPITHELIAL CELLS, UA: ABNORMAL /HPF (ref 0–5)
GLUCOSE URINE: NEGATIVE MG/DL
GONADOTROPIN, CHORIONIC (HCG) QUANT: NORMAL MIU/ML
HCT VFR BLD CALC: 37.4 % (ref 37–47)
HEMOGLOBIN: 12.8 G/DL (ref 12–16)
HEPATITIS B SURFACE ANTIGEN INTERPRETATION: NORMAL
HYALINE CASTS: ABNORMAL /HPF (ref 0–5)
KETONES, URINE: NEGATIVE MG/DL
LEUKOCYTE ESTERASE, URINE: ABNORMAL
LYMPHOCYTES ABSOLUTE: 3.4 K/UL (ref 1–4.8)
LYMPHOCYTES RELATIVE PERCENT: 33.6 %
Lab: NORMAL
MCH RBC QN AUTO: 33.4 PG (ref 27–31.3)
MCHC RBC AUTO-ENTMCNC: 34.2 % (ref 33–37)
MCV RBC AUTO: 97.6 FL (ref 82–100)
METHADONE SCREEN, URINE: NORMAL
MONOCYTES ABSOLUTE: 0.6 K/UL (ref 0.2–0.8)
MONOCYTES RELATIVE PERCENT: 6 %
NEUTROPHILS ABSOLUTE: 5.9 K/UL (ref 1.4–6.5)
NEUTROPHILS RELATIVE PERCENT: 58.9 %
NITRITE, URINE: NEGATIVE
OPIATE SCREEN URINE: NORMAL
OXYCODONE URINE: NORMAL
PDW BLD-RTO: 13.3 % (ref 11.5–14.5)
PH UA: 8 (ref 5–9)
PHENCYCLIDINE SCREEN URINE: NORMAL
PLATELET # BLD: 253 K/UL (ref 130–400)
PROPOXYPHENE SCREEN: NORMAL
PROTEIN UA: NEGATIVE MG/DL
RBC # BLD: 3.83 M/UL (ref 4.2–5.4)
RBC UA: ABNORMAL /HPF (ref 0–2)
RUBELLA ANTIBODY IGG: 40.1 IU/ML
SPECIFIC GRAVITY UA: 1.02 (ref 1–1.03)
UROBILINOGEN, URINE: 0.2 E.U./DL
WBC # BLD: 10 K/UL (ref 4.8–10.8)
WBC UA: ABNORMAL /HPF (ref 0–5)

## 2019-11-22 PROCEDURE — G8428 CUR MEDS NOT DOCUMENT: HCPCS | Performed by: OBSTETRICS & GYNECOLOGY

## 2019-11-22 PROCEDURE — G8420 CALC BMI NORM PARAMETERS: HCPCS | Performed by: OBSTETRICS & GYNECOLOGY

## 2019-11-22 PROCEDURE — G8484 FLU IMMUNIZE NO ADMIN: HCPCS | Performed by: OBSTETRICS & GYNECOLOGY

## 2019-11-22 PROCEDURE — 99214 OFFICE O/P EST MOD 30 MIN: CPT | Performed by: OBSTETRICS & GYNECOLOGY

## 2019-11-22 PROCEDURE — 76801 OB US < 14 WKS SINGLE FETUS: CPT | Performed by: OBSTETRICS & GYNECOLOGY

## 2019-11-22 PROCEDURE — 1036F TOBACCO NON-USER: CPT | Performed by: OBSTETRICS & GYNECOLOGY

## 2019-11-22 RX ORDER — ACETAMINOPHEN 500 MG
1000 TABLET ORAL EVERY 6 HOURS PRN
Qty: 60 TABLET | Refills: 1 | Status: ON HOLD | OUTPATIENT
Start: 2019-11-22 | End: 2019-12-11

## 2019-11-22 RX ORDER — FAMOTIDINE 20 MG/1
20 TABLET, FILM COATED ORAL 2 TIMES DAILY
Qty: 60 TABLET | Refills: 5 | Status: SHIPPED | OUTPATIENT
Start: 2019-11-22 | End: 2020-09-28

## 2019-11-22 RX ORDER — METOCLOPRAMIDE 10 MG/1
10 TABLET ORAL EVERY 6 HOURS PRN
Qty: 30 TABLET | Refills: 0 | Status: SHIPPED | OUTPATIENT
Start: 2019-11-22

## 2019-11-22 RX ORDER — SULFAMETHOXAZOLE AND TRIMETHOPRIM 800; 160 MG/1; MG/1
1 TABLET ORAL 2 TIMES DAILY
Qty: 14 TABLET | Refills: 0 | Status: SHIPPED | OUTPATIENT
Start: 2019-11-22 | End: 2019-11-23

## 2019-11-23 LAB
ABO/RH: NORMAL
ANTIBODY SCREEN: NORMAL
HIV 1,2 COMBO ANTIGEN/ANTIBODY: NEGATIVE

## 2019-11-23 RX ORDER — NITROFURANTOIN 25; 75 MG/1; MG/1
100 CAPSULE ORAL 2 TIMES DAILY
Qty: 20 CAPSULE | Refills: 0 | Status: SHIPPED | OUTPATIENT
Start: 2019-11-23 | End: 2019-12-03

## 2019-11-24 LAB
RPR: NORMAL
URINE CULTURE, ROUTINE: NORMAL
VZV IGG SER QL IA: 291 IV

## 2019-11-25 LAB
HEMOGLOBIN A-1 QUANTITATION: 96.6 % (ref 95–97.9)
HEMOGLOBIN A2 QUANTITATION: 2.7 % (ref 2–3.5)
HEMOGLOBIN C QUANTITATION: 0 % (ref 0–0)
HEMOGLOBIN E QUANTITATION: 0 % (ref 0–0)
HEMOGLOBIN ELECTROPHORESIS: NORMAL
HEMOGLOBIN EVALUATION: NORMAL
HEMOGLOBIN F QUANTITATION: 0.7 % (ref 0–2.1)
HEMOGLOBIN OTHER: 0 % (ref 0–0)
HEMOGLOBIN S QUANTITATION: 0 % (ref 0–0)
SICKLE CELL: NORMAL

## 2019-12-03 ENCOUNTER — INITIAL PRENATAL (OUTPATIENT)
Dept: OBGYN CLINIC | Age: 29
End: 2019-12-03
Payer: COMMERCIAL

## 2019-12-03 VITALS
SYSTOLIC BLOOD PRESSURE: 80 MMHG | WEIGHT: 101 LBS | HEART RATE: 98 BPM | DIASTOLIC BLOOD PRESSURE: 58 MMHG | BODY MASS INDEX: 20.4 KG/M2

## 2019-12-03 DIAGNOSIS — Z34.81 ENCOUNTER FOR SUPERVISION OF OTHER NORMAL PREGNANCY IN FIRST TRIMESTER: Primary | ICD-10-CM

## 2019-12-03 DIAGNOSIS — Z3A.08 8 WEEKS GESTATION OF PREGNANCY: ICD-10-CM

## 2019-12-03 PROCEDURE — 1036F TOBACCO NON-USER: CPT | Performed by: OBSTETRICS & GYNECOLOGY

## 2019-12-03 PROCEDURE — G8484 FLU IMMUNIZE NO ADMIN: HCPCS | Performed by: OBSTETRICS & GYNECOLOGY

## 2019-12-03 PROCEDURE — 99213 OFFICE O/P EST LOW 20 MIN: CPT | Performed by: OBSTETRICS & GYNECOLOGY

## 2019-12-03 PROCEDURE — G8427 DOCREV CUR MEDS BY ELIG CLIN: HCPCS | Performed by: OBSTETRICS & GYNECOLOGY

## 2019-12-03 PROCEDURE — G8420 CALC BMI NORM PARAMETERS: HCPCS | Performed by: OBSTETRICS & GYNECOLOGY

## 2019-12-03 RX ORDER — FAMOTIDINE 20 MG/1
20 TABLET, FILM COATED ORAL 2 TIMES DAILY
Qty: 60 TABLET | Refills: 3 | Status: ON HOLD | OUTPATIENT
Start: 2019-12-03 | End: 2019-12-11

## 2019-12-10 ENCOUNTER — TELEPHONE (OUTPATIENT)
Dept: OBGYN CLINIC | Age: 29
End: 2019-12-10

## 2019-12-10 ENCOUNTER — APPOINTMENT (OUTPATIENT)
Dept: ULTRASOUND IMAGING | Age: 29
End: 2019-12-10
Payer: COMMERCIAL

## 2019-12-10 ENCOUNTER — HOSPITAL ENCOUNTER (EMERGENCY)
Age: 29
Discharge: HOME | End: 2019-12-10
Attending: STUDENT IN AN ORGANIZED HEALTH CARE EDUCATION/TRAINING PROGRAM
Payer: COMMERCIAL

## 2019-12-10 VITALS
RESPIRATION RATE: 20 BRPM | DIASTOLIC BLOOD PRESSURE: 74 MMHG | WEIGHT: 102 LBS | SYSTOLIC BLOOD PRESSURE: 117 MMHG | BODY MASS INDEX: 20.56 KG/M2 | HEART RATE: 63 BPM | TEMPERATURE: 98.2 F | HEIGHT: 59 IN | OXYGEN SATURATION: 98 %

## 2019-12-10 DIAGNOSIS — Z34.81 ENCOUNTER FOR SUPERVISION OF OTHER NORMAL PREGNANCY IN FIRST TRIMESTER: ICD-10-CM

## 2019-12-10 DIAGNOSIS — O02.1 FETAL DEMISE BEFORE 20 WEEKS WITH RETENTION OF DEAD FETUS: Primary | ICD-10-CM

## 2019-12-10 LAB
ABO/RH: NORMAL
ALBUMIN SERPL-MCNC: 4.8 G/DL (ref 3.5–4.6)
ALP BLD-CCNC: 35 U/L (ref 40–130)
ALT SERPL-CCNC: 26 U/L (ref 0–33)
ANION GAP SERPL CALCULATED.3IONS-SCNC: 14 MEQ/L (ref 9–15)
APTT: 32.9 SEC (ref 24.4–36.8)
AST SERPL-CCNC: 30 U/L (ref 0–35)
BACTERIA: ABNORMAL /HPF
BASOPHILS ABSOLUTE: 0.1 K/UL (ref 0–0.2)
BASOPHILS RELATIVE PERCENT: 0.8 %
BILIRUB SERPL-MCNC: <0.2 MG/DL (ref 0.2–0.7)
BILIRUBIN URINE: NEGATIVE
BLOOD, URINE: ABNORMAL
BUN BLDV-MCNC: 3 MG/DL (ref 6–20)
CALCIUM SERPL-MCNC: 9.5 MG/DL (ref 8.5–9.9)
CHLORIDE BLD-SCNC: 99 MEQ/L (ref 95–107)
CLARITY: ABNORMAL
CO2: 23 MEQ/L (ref 20–31)
COLOR: YELLOW
CREAT SERPL-MCNC: 0.42 MG/DL (ref 0.5–0.9)
EOSINOPHILS ABSOLUTE: 0.2 K/UL (ref 0–0.7)
EOSINOPHILS RELATIVE PERCENT: 1.9 %
EPITHELIAL CELLS, UA: ABNORMAL /HPF (ref 0–5)
GFR AFRICAN AMERICAN: >60
GFR NON-AFRICAN AMERICAN: >60
GLOBULIN: 3.4 G/DL (ref 2.3–3.5)
GLUCOSE BLD-MCNC: 83 MG/DL (ref 70–99)
GLUCOSE URINE: NEGATIVE MG/DL
GONADOTROPIN, CHORIONIC (HCG) QUANT: 4866 MIU/ML
HCT VFR BLD CALC: 40.6 % (ref 37–47)
HEMOGLOBIN: 13.8 G/DL (ref 12–16)
HYALINE CASTS: ABNORMAL /HPF (ref 0–5)
INR BLD: 1
KETONES, URINE: NEGATIVE MG/DL
LEUKOCYTE ESTERASE, URINE: ABNORMAL
LYMPHOCYTES ABSOLUTE: 3.5 K/UL (ref 1–4.8)
LYMPHOCYTES RELATIVE PERCENT: 34.2 %
MCH RBC QN AUTO: 32.9 PG (ref 27–31.3)
MCHC RBC AUTO-ENTMCNC: 34 % (ref 33–37)
MCV RBC AUTO: 96.7 FL (ref 82–100)
MONOCYTES ABSOLUTE: 0.6 K/UL (ref 0.2–0.8)
MONOCYTES RELATIVE PERCENT: 5.6 %
NEUTROPHILS ABSOLUTE: 5.9 K/UL (ref 1.4–6.5)
NEUTROPHILS RELATIVE PERCENT: 57.5 %
NITRITE, URINE: NEGATIVE
PDW BLD-RTO: 12.9 % (ref 11.5–14.5)
PH UA: 7.5 (ref 5–9)
PLATELET # BLD: 226 K/UL (ref 130–400)
POTASSIUM SERPL-SCNC: 4.6 MEQ/L (ref 3.4–4.9)
PROTEIN UA: ABNORMAL MG/DL
PROTHROMBIN TIME: 13.8 SEC (ref 12.3–14.9)
RBC # BLD: 4.19 M/UL (ref 4.2–5.4)
RBC UA: ABNORMAL /HPF (ref 0–5)
SODIUM BLD-SCNC: 136 MEQ/L (ref 135–144)
SPECIFIC GRAVITY UA: 1.02 (ref 1–1.03)
TOTAL PROTEIN: 8.2 G/DL (ref 6.3–8)
URINE REFLEX TO CULTURE: YES
UROBILINOGEN, URINE: 0.2 E.U./DL
WBC # BLD: 10.2 K/UL (ref 4.8–10.8)
WBC UA: >100 /HPF (ref 0–5)

## 2019-12-10 PROCEDURE — 36415 COLL VENOUS BLD VENIPUNCTURE: CPT

## 2019-12-10 PROCEDURE — 85610 PROTHROMBIN TIME: CPT

## 2019-12-10 PROCEDURE — 84702 CHORIONIC GONADOTROPIN TEST: CPT

## 2019-12-10 PROCEDURE — 86901 BLOOD TYPING SEROLOGIC RH(D): CPT

## 2019-12-10 PROCEDURE — 85025 COMPLETE CBC W/AUTO DIFF WBC: CPT

## 2019-12-10 PROCEDURE — 99284 EMERGENCY DEPT VISIT MOD MDM: CPT

## 2019-12-10 PROCEDURE — 76817 TRANSVAGINAL US OBSTETRIC: CPT

## 2019-12-10 PROCEDURE — 86900 BLOOD TYPING SEROLOGIC ABO: CPT

## 2019-12-10 PROCEDURE — 85730 THROMBOPLASTIN TIME PARTIAL: CPT

## 2019-12-10 PROCEDURE — 76801 OB US < 14 WKS SINGLE FETUS: CPT

## 2019-12-10 PROCEDURE — 81001 URINALYSIS AUTO W/SCOPE: CPT

## 2019-12-10 PROCEDURE — 80053 COMPREHEN METABOLIC PANEL: CPT

## 2019-12-10 PROCEDURE — 87086 URINE CULTURE/COLONY COUNT: CPT

## 2019-12-10 RX ORDER — FLUCONAZOLE 150 MG/1
TABLET ORAL
Qty: 3 TABLET | Refills: 0 | Status: SHIPPED | OUTPATIENT
Start: 2019-12-10 | End: 2020-01-02 | Stop reason: ALTCHOICE

## 2019-12-10 RX ORDER — SODIUM CHLORIDE 9 MG/ML
INJECTION, SOLUTION INTRAVENOUS
Status: DISCONTINUED
Start: 2019-12-10 | End: 2019-12-10 | Stop reason: HOSPADM

## 2019-12-10 ASSESSMENT — ENCOUNTER SYMPTOMS
VOMITING: 0
NAUSEA: 0
SINUS PRESSURE: 0
BACK PAIN: 0
TROUBLE SWALLOWING: 0
DIARRHEA: 0
ABDOMINAL PAIN: 0
CHEST TIGHTNESS: 0
SHORTNESS OF BREATH: 0
COUGH: 0

## 2019-12-11 ENCOUNTER — HOSPITAL ENCOUNTER (OUTPATIENT)
Age: 29
Setting detail: OUTPATIENT SURGERY
Discharge: HOME | End: 2019-12-11
Attending: OBSTETRICS & GYNECOLOGY | Admitting: OBSTETRICS & GYNECOLOGY
Payer: COMMERCIAL

## 2019-12-11 ENCOUNTER — ANESTHESIA (OUTPATIENT)
Dept: OPERATING ROOM | Age: 29
End: 2019-12-11
Payer: COMMERCIAL

## 2019-12-11 ENCOUNTER — ANESTHESIA EVENT (OUTPATIENT)
Dept: OPERATING ROOM | Age: 29
End: 2019-12-11
Payer: COMMERCIAL

## 2019-12-11 VITALS
SYSTOLIC BLOOD PRESSURE: 111 MMHG | TEMPERATURE: 97.8 F | HEART RATE: 77 BPM | BODY MASS INDEX: 20.56 KG/M2 | OXYGEN SATURATION: 99 % | WEIGHT: 102 LBS | HEIGHT: 59 IN | RESPIRATION RATE: 20 BRPM | DIASTOLIC BLOOD PRESSURE: 70 MMHG

## 2019-12-11 VITALS — OXYGEN SATURATION: 99 % | DIASTOLIC BLOOD PRESSURE: 52 MMHG | SYSTOLIC BLOOD PRESSURE: 89 MMHG

## 2019-12-11 LAB
CHLAMYDIA TRACHOMATIS AMPLIFIED DET: NORMAL
N GONORRHOEAE AMPLIFIED DET: NORMAL
PAP SMEAR: NORMAL

## 2019-12-11 PROCEDURE — 3700000000 HC ANESTHESIA ATTENDED CARE: Performed by: OBSTETRICS & GYNECOLOGY

## 2019-12-11 PROCEDURE — 6360000002 HC RX W HCPCS: Performed by: NURSE ANESTHETIST, CERTIFIED REGISTERED

## 2019-12-11 PROCEDURE — 3700000001 HC ADD 15 MINUTES (ANESTHESIA): Performed by: OBSTETRICS & GYNECOLOGY

## 2019-12-11 PROCEDURE — 2709999900 HC NON-CHARGEABLE SUPPLY: Performed by: OBSTETRICS & GYNECOLOGY

## 2019-12-11 PROCEDURE — 88305 TISSUE EXAM BY PATHOLOGIST: CPT

## 2019-12-11 PROCEDURE — 7100000011 HC PHASE II RECOVERY - ADDTL 15 MIN: Performed by: OBSTETRICS & GYNECOLOGY

## 2019-12-11 PROCEDURE — 7100000000 HC PACU RECOVERY - FIRST 15 MIN: Performed by: OBSTETRICS & GYNECOLOGY

## 2019-12-11 PROCEDURE — 7100000001 HC PACU RECOVERY - ADDTL 15 MIN: Performed by: OBSTETRICS & GYNECOLOGY

## 2019-12-11 PROCEDURE — 59820 CARE OF MISCARRIAGE: CPT | Performed by: OBSTETRICS & GYNECOLOGY

## 2019-12-11 PROCEDURE — 7100000010 HC PHASE II RECOVERY - FIRST 15 MIN: Performed by: OBSTETRICS & GYNECOLOGY

## 2019-12-11 PROCEDURE — 3600000002 HC SURGERY LEVEL 2 BASE: Performed by: OBSTETRICS & GYNECOLOGY

## 2019-12-11 PROCEDURE — 3600000012 HC SURGERY LEVEL 2 ADDTL 15MIN: Performed by: OBSTETRICS & GYNECOLOGY

## 2019-12-11 RX ORDER — ONDANSETRON 2 MG/ML
INJECTION INTRAMUSCULAR; INTRAVENOUS PRN
Status: DISCONTINUED | OUTPATIENT
Start: 2019-12-11 | End: 2019-12-11 | Stop reason: SDUPTHER

## 2019-12-11 RX ORDER — GENTAMICIN SULFATE 40 MG/ML
INJECTION, SOLUTION INTRAMUSCULAR; INTRAVENOUS PRN
Status: DISCONTINUED | OUTPATIENT
Start: 2019-12-11 | End: 2019-12-11 | Stop reason: SDUPTHER

## 2019-12-11 RX ORDER — METHYLERGONOVINE MALEATE 0.2 MG/1
0.2 TABLET ORAL 3 TIMES DAILY
Qty: 9 TABLET | Refills: 0 | Status: SHIPPED | OUTPATIENT
Start: 2019-12-11 | End: 2020-01-02 | Stop reason: ALTCHOICE

## 2019-12-11 RX ORDER — DOXYCYCLINE HYCLATE 100 MG
100 TABLET ORAL 2 TIMES DAILY
Qty: 20 TABLET | Refills: 0 | Status: SHIPPED | OUTPATIENT
Start: 2019-12-11 | End: 2019-12-21

## 2019-12-11 RX ORDER — DEXAMETHASONE SODIUM PHOSPHATE 10 MG/ML
INJECTION INTRAMUSCULAR; INTRAVENOUS PRN
Status: DISCONTINUED | OUTPATIENT
Start: 2019-12-11 | End: 2019-12-11 | Stop reason: SDUPTHER

## 2019-12-11 RX ORDER — SODIUM CHLORIDE, SODIUM LACTATE, POTASSIUM CHLORIDE, CALCIUM CHLORIDE 600; 310; 30; 20 MG/100ML; MG/100ML; MG/100ML; MG/100ML
INJECTION, SOLUTION INTRAVENOUS CONTINUOUS
Status: DISCONTINUED | OUTPATIENT
Start: 2019-12-11 | End: 2019-12-11 | Stop reason: HOSPADM

## 2019-12-11 RX ORDER — PNV NO.95/FERROUS FUM/FOLIC AC 28MG-0.8MG
1 TABLET ORAL DAILY
Refills: 3 | COMMUNITY
Start: 2019-11-01 | End: 2019-12-27 | Stop reason: ALTCHOICE

## 2019-12-11 RX ORDER — MEPERIDINE HYDROCHLORIDE 25 MG/ML
12.5 INJECTION INTRAMUSCULAR; INTRAVENOUS; SUBCUTANEOUS EVERY 5 MIN PRN
Status: DISCONTINUED | OUTPATIENT
Start: 2019-12-11 | End: 2019-12-11 | Stop reason: HOSPADM

## 2019-12-11 RX ORDER — DIPHENHYDRAMINE HYDROCHLORIDE 50 MG/ML
12.5 INJECTION INTRAMUSCULAR; INTRAVENOUS
Status: DISCONTINUED | OUTPATIENT
Start: 2019-12-11 | End: 2019-12-11 | Stop reason: HOSPADM

## 2019-12-11 RX ORDER — OXYTOCIN 10 [USP'U]/ML
INJECTION, SOLUTION INTRAMUSCULAR; INTRAVENOUS PRN
Status: DISCONTINUED | OUTPATIENT
Start: 2019-12-11 | End: 2019-12-11 | Stop reason: SDUPTHER

## 2019-12-11 RX ORDER — METHYLERGONOVINE MALEATE 0.2 MG/ML
INJECTION INTRAVENOUS PRN
Status: DISCONTINUED | OUTPATIENT
Start: 2019-12-11 | End: 2019-12-11 | Stop reason: SDUPTHER

## 2019-12-11 RX ORDER — ACETAMINOPHEN 500 MG
1000 TABLET ORAL EVERY 6 HOURS PRN
Qty: 60 TABLET | Refills: 1 | Status: SHIPPED | OUTPATIENT
Start: 2019-12-11 | End: 2020-01-02 | Stop reason: SDUPTHER

## 2019-12-11 RX ORDER — METOCLOPRAMIDE HYDROCHLORIDE 5 MG/ML
10 INJECTION INTRAMUSCULAR; INTRAVENOUS
Status: DISCONTINUED | OUTPATIENT
Start: 2019-12-11 | End: 2019-12-11 | Stop reason: HOSPADM

## 2019-12-11 RX ORDER — PROPOFOL 10 MG/ML
INJECTION, EMULSION INTRAVENOUS PRN
Status: DISCONTINUED | OUTPATIENT
Start: 2019-12-11 | End: 2019-12-11 | Stop reason: SDUPTHER

## 2019-12-11 RX ORDER — ONDANSETRON 2 MG/ML
4 INJECTION INTRAMUSCULAR; INTRAVENOUS
Status: DISCONTINUED | OUTPATIENT
Start: 2019-12-11 | End: 2019-12-11 | Stop reason: HOSPADM

## 2019-12-11 RX ORDER — MIDAZOLAM HYDROCHLORIDE 1 MG/ML
INJECTION INTRAMUSCULAR; INTRAVENOUS PRN
Status: DISCONTINUED | OUTPATIENT
Start: 2019-12-11 | End: 2019-12-11 | Stop reason: SDUPTHER

## 2019-12-11 RX ORDER — FENTANYL CITRATE 50 UG/ML
50 INJECTION, SOLUTION INTRAMUSCULAR; INTRAVENOUS EVERY 10 MIN PRN
Status: DISCONTINUED | OUTPATIENT
Start: 2019-12-11 | End: 2019-12-11 | Stop reason: HOSPADM

## 2019-12-11 RX ORDER — FENTANYL CITRATE 50 UG/ML
INJECTION, SOLUTION INTRAMUSCULAR; INTRAVENOUS PRN
Status: DISCONTINUED | OUTPATIENT
Start: 2019-12-11 | End: 2019-12-11 | Stop reason: SDUPTHER

## 2019-12-11 RX ADMIN — PROPOFOL 120 MG: 10 INJECTION, EMULSION INTRAVENOUS at 16:05

## 2019-12-11 RX ADMIN — METHYLERGONOVINE MALEATE 200 MCG: 0.2 INJECTION INTRAMUSCULAR; INTRAVENOUS at 16:16

## 2019-12-11 RX ADMIN — ONDANSETRON 4 MG: 2 INJECTION INTRAMUSCULAR; INTRAVENOUS at 16:10

## 2019-12-11 RX ADMIN — GENTAMICIN SULFATE 80 MG: 40 INJECTION, SOLUTION INTRAMUSCULAR; INTRAVENOUS at 16:10

## 2019-12-11 RX ADMIN — MIDAZOLAM HYDROCHLORIDE 2 MG: 2 INJECTION, SOLUTION INTRAMUSCULAR; INTRAVENOUS at 16:01

## 2019-12-11 RX ADMIN — FENTANYL CITRATE 50 MCG: 50 INJECTION, SOLUTION INTRAMUSCULAR; INTRAVENOUS at 16:05

## 2019-12-11 RX ADMIN — DEXAMETHASONE SODIUM PHOSPHATE 10 MG: 10 INJECTION INTRAMUSCULAR; INTRAVENOUS at 16:10

## 2019-12-11 RX ADMIN — OXYTOCIN 20 UNITS: 10 INJECTION, SOLUTION INTRAMUSCULAR; INTRAVENOUS at 16:16

## 2019-12-11 RX ADMIN — FENTANYL CITRATE 50 MCG: 50 INJECTION, SOLUTION INTRAMUSCULAR; INTRAVENOUS at 16:01

## 2019-12-11 ASSESSMENT — PULMONARY FUNCTION TESTS
PIF_VALUE: 0
PIF_VALUE: 10
PIF_VALUE: 0
PIF_VALUE: 10
PIF_VALUE: 22
PIF_VALUE: 7
PIF_VALUE: 10
PIF_VALUE: 10
PIF_VALUE: 2
PIF_VALUE: 10
PIF_VALUE: 10
PIF_VALUE: 4
PIF_VALUE: 0
PIF_VALUE: 1
PIF_VALUE: 0
PIF_VALUE: 10
PIF_VALUE: 10
PIF_VALUE: 0
PIF_VALUE: 0
PIF_VALUE: 10

## 2019-12-11 ASSESSMENT — PAIN - FUNCTIONAL ASSESSMENT: PAIN_FUNCTIONAL_ASSESSMENT: 0-10

## 2019-12-12 ENCOUNTER — TELEPHONE (OUTPATIENT)
Dept: OBGYN CLINIC | Age: 29
End: 2019-12-12

## 2019-12-12 LAB — URINE CULTURE, ROUTINE: NORMAL

## 2019-12-18 ENCOUNTER — OFFICE VISIT (OUTPATIENT)
Dept: OBGYN CLINIC | Age: 29
End: 2019-12-18

## 2019-12-18 VITALS
DIASTOLIC BLOOD PRESSURE: 60 MMHG | SYSTOLIC BLOOD PRESSURE: 102 MMHG | BODY MASS INDEX: 19.56 KG/M2 | WEIGHT: 97 LBS | HEART RATE: 64 BPM | HEIGHT: 59 IN

## 2019-12-18 PROCEDURE — 99024 POSTOP FOLLOW-UP VISIT: CPT | Performed by: OBSTETRICS & GYNECOLOGY

## 2019-12-18 RX ORDER — CLINDAMYCIN PHOSPHATE 20 MG/G
CREAM VAGINAL
Qty: 1 TUBE | Refills: 0 | Status: SHIPPED | OUTPATIENT
Start: 2019-12-18 | End: 2020-06-29 | Stop reason: ALTCHOICE

## 2019-12-18 RX ORDER — POLYETHYLENE GLYCOL 3350 17 G/17G
POWDER, FOR SOLUTION ORAL
Qty: 1020 G | Refills: 0 | Status: SHIPPED | OUTPATIENT
Start: 2019-12-18 | End: 2020-06-29 | Stop reason: ALTCHOICE

## 2019-12-19 LAB
EER NON INVASIVE PRENATAL ANEUPLOIDY: NORMAL
FETAL FRACTION: 4.9 %
FETAL GENDER: NORMAL
FETUS COUNT: 1
GESTATIONAL AGE (DAYS): 1 D
GESTATIONAL AGE(WEEKS): 11 WEEKS
HEIGHT: NORMAL
Lab: NORMAL
MATERNAL WEIGHT: 102 LBS
MONOSOMY X: NORMAL
REPORT FETUS GENDER: YES
TRIPLOIDY (VANISHING TWIN): NORMAL
TRISOMY 13 RISK: NORMAL
TRISOMY 18 RISK ASSESSMENT: NORMAL
TRISOMY 21 RISK: NORMAL

## 2019-12-27 ENCOUNTER — OFFICE VISIT (OUTPATIENT)
Dept: CARDIOLOGY CLINIC | Age: 29
End: 2019-12-27
Payer: COMMERCIAL

## 2019-12-27 ENCOUNTER — TELEPHONE (OUTPATIENT)
Dept: OBGYN CLINIC | Age: 29
End: 2019-12-27

## 2019-12-27 VITALS
HEART RATE: 65 BPM | WEIGHT: 99 LBS | BODY MASS INDEX: 19.96 KG/M2 | DIASTOLIC BLOOD PRESSURE: 60 MMHG | HEIGHT: 59 IN | SYSTOLIC BLOOD PRESSURE: 110 MMHG

## 2019-12-27 DIAGNOSIS — R06.02 SOB (SHORTNESS OF BREATH): ICD-10-CM

## 2019-12-27 DIAGNOSIS — R07.2 PRECORDIAL PAIN: Primary | ICD-10-CM

## 2019-12-27 PROCEDURE — G8427 DOCREV CUR MEDS BY ELIG CLIN: HCPCS | Performed by: INTERNAL MEDICINE

## 2019-12-27 PROCEDURE — G8484 FLU IMMUNIZE NO ADMIN: HCPCS | Performed by: INTERNAL MEDICINE

## 2019-12-27 PROCEDURE — 99204 OFFICE O/P NEW MOD 45 MIN: CPT | Performed by: INTERNAL MEDICINE

## 2019-12-27 PROCEDURE — 93000 ELECTROCARDIOGRAM COMPLETE: CPT | Performed by: INTERNAL MEDICINE

## 2019-12-27 PROCEDURE — 1036F TOBACCO NON-USER: CPT | Performed by: INTERNAL MEDICINE

## 2019-12-27 PROCEDURE — G8420 CALC BMI NORM PARAMETERS: HCPCS | Performed by: INTERNAL MEDICINE

## 2019-12-27 RX ORDER — NITROGLYCERIN 0.4 MG/1
0.4 TABLET SUBLINGUAL EVERY 5 MIN PRN
Qty: 25 TABLET | Refills: 3 | Status: SHIPPED | OUTPATIENT
Start: 2019-12-27 | End: 2022-02-04 | Stop reason: SDUPTHER

## 2019-12-27 RX ORDER — RANOLAZINE 500 MG/1
500 TABLET, EXTENDED RELEASE ORAL 2 TIMES DAILY
Qty: 60 TABLET | Refills: 3 | Status: SHIPPED | OUTPATIENT
Start: 2019-12-27 | End: 2020-06-29 | Stop reason: SDUPTHER

## 2019-12-30 RX ORDER — IBUPROFEN 800 MG/1
800 TABLET ORAL EVERY 6 HOURS PRN
Qty: 60 TABLET | Refills: 1 | Status: SHIPPED | OUTPATIENT
Start: 2019-12-30 | End: 2020-01-02 | Stop reason: ALTCHOICE

## 2020-01-01 NOTE — PROGRESS NOTES
Father     Heart Disease Maternal Grandmother     Asthma Maternal Grandmother     Diabetes Maternal Grandmother     Kidney Disease Maternal Grandmother     Other Maternal Grandmother     Cancer Paternal Grandfather      Social History     Socioeconomic History    Marital status: Legally      Spouse name: Not on file    Number of children: Not on file    Years of education: Not on file    Highest education level: Not on file   Occupational History    Not on file   Social Needs    Financial resource strain: Not on file    Food insecurity:     Worry: Not on file     Inability: Not on file    Transportation needs:     Medical: Not on file     Non-medical: Not on file   Tobacco Use    Smoking status: Former Smoker     Packs/day: 0.50     Years: 20.00     Pack years: 10.00     Types: Cigarettes     Start date: 1/19/1997    Smokeless tobacco: Never Used   Substance and Sexual Activity    Alcohol use: No     Alcohol/week: 0.0 standard drinks    Drug use: No    Sexual activity: Yes     Partners: Male   Lifestyle    Physical activity:     Days per week: Not on file     Minutes per session: Not on file    Stress: Not on file   Relationships    Social connections:     Talks on phone: Not on file     Gets together: Not on file     Attends [employer]ism service: Not on file     Active member of club or organization: Not on file     Attends meetings of clubs or organizations: Not on file     Relationship status: Not on file    Intimate partner violence:     Fear of current or ex partner: Not on file     Emotionally abused: Not on file     Physically abused: Not on file     Forced sexual activity: Not on file   Other Topics Concern    Not on file   Social History Narrative    Not on file       Contraceptive method:  none    Patient's medications, allergies, past medical, surgical,social and family histories were reviewed and updated as appropriate.     Review of Systems  Review of Systems   All other

## 2020-01-02 ENCOUNTER — OFFICE VISIT (OUTPATIENT)
Dept: FAMILY MEDICINE CLINIC | Age: 30
End: 2020-01-02
Payer: COMMERCIAL

## 2020-01-02 VITALS
RESPIRATION RATE: 16 BRPM | OXYGEN SATURATION: 99 % | DIASTOLIC BLOOD PRESSURE: 70 MMHG | WEIGHT: 100 LBS | SYSTOLIC BLOOD PRESSURE: 102 MMHG | HEIGHT: 59 IN | BODY MASS INDEX: 20.16 KG/M2 | TEMPERATURE: 98 F | HEART RATE: 67 BPM

## 2020-01-02 PROCEDURE — G8484 FLU IMMUNIZE NO ADMIN: HCPCS | Performed by: FAMILY MEDICINE

## 2020-01-02 PROCEDURE — G8420 CALC BMI NORM PARAMETERS: HCPCS | Performed by: FAMILY MEDICINE

## 2020-01-02 PROCEDURE — G8427 DOCREV CUR MEDS BY ELIG CLIN: HCPCS | Performed by: FAMILY MEDICINE

## 2020-01-02 PROCEDURE — 99214 OFFICE O/P EST MOD 30 MIN: CPT | Performed by: FAMILY MEDICINE

## 2020-01-02 PROCEDURE — 1036F TOBACCO NON-USER: CPT | Performed by: FAMILY MEDICINE

## 2020-01-02 RX ORDER — CLINDAMYCIN HYDROCHLORIDE 150 MG/1
150 CAPSULE ORAL 3 TIMES DAILY
Qty: 30 CAPSULE | Refills: 0 | Status: SHIPPED | OUTPATIENT
Start: 2020-01-02 | End: 2020-01-12

## 2020-01-02 RX ORDER — SUMATRIPTAN 50 MG/1
50 TABLET, FILM COATED ORAL
Qty: 9 TABLET | Refills: 5 | Status: SHIPPED | OUTPATIENT
Start: 2020-01-02 | End: 2020-06-29 | Stop reason: ALTCHOICE

## 2020-01-02 RX ORDER — PROPRANOLOL HCL 60 MG
60 CAPSULE, EXTENDED RELEASE 24HR ORAL DAILY
Qty: 30 CAPSULE | Refills: 5 | Status: SHIPPED | OUTPATIENT
Start: 2020-01-02 | End: 2020-06-29 | Stop reason: SDUPTHER

## 2020-01-02 RX ORDER — VARENICLINE TARTRATE 0.5 MG/1
.5-1 TABLET, FILM COATED ORAL SEE ADMIN INSTRUCTIONS
Qty: 57 TABLET | Refills: 0 | Status: SHIPPED | OUTPATIENT
Start: 2020-01-02 | End: 2020-01-17 | Stop reason: ALTCHOICE

## 2020-01-02 NOTE — PROGRESS NOTES
Patient: Taran Campbell    YOB: 1990    Date: 20    Chief Complaint   Patient presents with    Migraine     She states she has had a migraine for 3 weeks. She is scheduled to have a hysterectomy on 2020 with Dr. Leonardo Clay.  Health Maintenance     She declines flu vaccine today. Patient Active Problem List    Diagnosis Date Noted    Precordial pain 2019    , missed     Postpartum depression 2018    Patient underweight 2018    Anxiety 2018    Abnormal glucose tolerance in mother complicating pregnancy 2980    Pelvic pain affecting pregnancy in third trimester, antepartum     Supervision of normal pregnancy 2017    Intractable migraine without aura and without status migrainosus 2017    Heart palpitations 2017    Chronic diarrhea of unknown origin 2017    Back pain affecting pregnancy in second trimester 2017    Previous  delivery affecting pregnancy 2017     (vaginal birth after )     Leakage of amniotic fluid      contractions     34 weeks gestation of pregnancy     Anemia affecting pregnancy     Congenital anomalies of fetus 2014       Allergies   Allergen Reactions    Acyclovir And Related Swelling    Morphine Itching    Pcn [Penicillins] Anaphylaxis and Hives    Amoxicillin Hives and Swelling     Throat swelling    Cephalexin Hives    Ibuprofen Hives    Macrobid [Nitrofurantoin Macrocrystal] Hives    Hydrocodone-Acetaminophen Itching    Oxycodone-Acetaminophen Itching       Vitals:    20 1501   BP: 102/70   Site: Left Upper Arm   Position: Sitting   Cuff Size: Small Adult   Pulse: 67   Resp: 16   Temp: 98 °F (36.7 °C)   TempSrc: Oral   SpO2: 99%   Weight: 100 lb (45.4 kg)   Height: 4' 11\" (1.499 m)      Body mass index is 20.2 kg/m².     HPI    I have not seen this patient in a while since I last saw her she had a missed  and had to muscles symmetrical.  Pupils equal round reactive to light bilaterally conjunctive and sclera clear hearing normal  Neck: Soft nontender no adenopathy she does have a follicle along in front of the right ear that is slightly tender little bit indurated no pus  Lungs: Clear to auscultation without wheezes rhonchi or rales she smells of cigarette smoke  Heart: Regular rate and rhythm without murmurs rubs or gallops          Assessment:   Diagnosis Orders   1. Intractable persistent migraine aura without cerebral infarction and without status migrainosus  propranolol (INDERAL LA) 60 MG extended release capsule status headaches are incredibly hard to treat and we will start with this modality to see if we can get things to improve try to get her to stop smoking and then when she has had her surgery will work with her on additional treatments if necessary    SUMAtriptan (IMITREX) 50 MG tablet   2. , missed     3. Folliculitis  clindamycin (CLEOCIN) 150 MG capsule   4.  Smoking  varenicline (CHANTIX) 0.5 MG tablet               Plan:  Current Outpatient Medications   Medication Sig Dispense Refill    propranolol (INDERAL LA) 60 MG extended release capsule Take 1 capsule by mouth daily 30 capsule 5    SUMAtriptan (IMITREX) 50 MG tablet Take 1 tablet by mouth once as needed for Migraine 9 tablet 5    varenicline (CHANTIX) 0.5 MG tablet Take 1-2 tablets by mouth See Admin Instructions 0.5mg DAILY for 3 days followed by 0.5mg TWICE DAILY for 4 days followed by 1mg TWICE DAILY 57 tablet 0    clindamycin (CLEOCIN) 150 MG capsule Take 1 capsule by mouth 3 times daily for 10 days 30 capsule 0    ranolazine (RANEXA) 500 MG extended release tablet Take 1 tablet by mouth 2 times daily 60 tablet 3    nitroGLYCERIN (NITROSTAT) 0.4 MG SL tablet Place 1 tablet under the tongue every 5 minutes as needed for Chest pain 25 tablet 3    polyethylene glycol (MIRALAX) powder Use ONE CAPEFUL at 10 am and then 2 pm on day prior to procedure 1020 g 0    clindamycin (CLEOCIN) 2 % vaginal cream USE one applicatorful vaginally nightly daily for 1 week prior to procedure 1 Tube 0    famotidine (PEPCID) 20 MG tablet Take 1 tablet by mouth 2 times daily 60 tablet 5    metoclopramide (REGLAN) 10 MG tablet Take 1 tablet by mouth every 6 hours as needed (N/V) 30 tablet 0    acetaminophen (APAP EXTRA STRENGTH) 500 MG tablet Take 2 tablets by mouth every 6 hours as needed for Pain or Fever 120 tablet 0    nortriptyline (PAMELOR) 10 MG capsule Take 3 capsules by mouth nightly       No current facility-administered medications for this visit. No orders of the defined types were placed in this encounter. Orders Placed This Encounter   Medications    propranolol (INDERAL LA) 60 MG extended release capsule     Sig: Take 1 capsule by mouth daily     Dispense:  30 capsule     Refill:  5    SUMAtriptan (IMITREX) 50 MG tablet     Sig: Take 1 tablet by mouth once as needed for Migraine     Dispense:  9 tablet     Refill:  5    varenicline (CHANTIX) 0.5 MG tablet     Sig: Take 1-2 tablets by mouth See Admin Instructions 0.5mg DAILY for 3 days followed by 0.5mg TWICE DAILY for 4 days followed by 1mg TWICE DAILY     Dispense:  57 tablet     Refill:  0    clindamycin (CLEOCIN) 150 MG capsule     Sig: Take 1 capsule by mouth 3 times daily for 10 days     Dispense:  30 capsule     Refill:  0              Return in about 6 weeks (around 2/13/2020), or if symptoms worsen or fail to improve, for robert Boyd      1/2/20  4:38 PM

## 2020-01-03 ENCOUNTER — OFFICE VISIT (OUTPATIENT)
Dept: OBGYN CLINIC | Age: 30
End: 2020-01-03

## 2020-01-03 VITALS
SYSTOLIC BLOOD PRESSURE: 102 MMHG | WEIGHT: 99 LBS | BODY MASS INDEX: 19.96 KG/M2 | HEART RATE: 60 BPM | HEIGHT: 59 IN | DIASTOLIC BLOOD PRESSURE: 64 MMHG

## 2020-01-03 DIAGNOSIS — Z98.890 S/P D&C (STATUS POST DILATION AND CURETTAGE): ICD-10-CM

## 2020-01-03 LAB — GONADOTROPIN, CHORIONIC (HCG) QUANT: 4.8 MIU/ML

## 2020-01-03 PROCEDURE — G8427 DOCREV CUR MEDS BY ELIG CLIN: HCPCS | Performed by: OBSTETRICS & GYNECOLOGY

## 2020-01-03 PROCEDURE — G8420 CALC BMI NORM PARAMETERS: HCPCS | Performed by: OBSTETRICS & GYNECOLOGY

## 2020-01-03 PROCEDURE — 1036F TOBACCO NON-USER: CPT | Performed by: OBSTETRICS & GYNECOLOGY

## 2020-01-03 PROCEDURE — 99024 POSTOP FOLLOW-UP VISIT: CPT | Performed by: OBSTETRICS & GYNECOLOGY

## 2020-01-03 PROCEDURE — G8484 FLU IMMUNIZE NO ADMIN: HCPCS | Performed by: OBSTETRICS & GYNECOLOGY

## 2020-01-03 NOTE — PROGRESS NOTES
Results Review      Regina Vasquez is a [id number] [facility] female here to discuss post D&C to discuss chromosomal analysis testing results regarding possible etiology or cause of miscarriage . PREVIOUS VISIT: s/p SD&C. Vitals:  /64 (Site: Left Upper Arm, Position: Sitting, Cuff Size: Medium Adult)   Pulse 60   Ht 4' 11.25\" (1.505 m)   Wt 99 lb (44.9 kg)   BMI 19.83 kg/m²   Allergies:  Acyclovir and related; Morphine; Pcn [penicillins]; Amoxicillin; Cephalexin; Ibuprofen; Macrobid [nitrofurantoin macrocrystal];  Hydrocodone-acetaminophen; and Oxycodone-acetaminophen  Past Medical History:   Diagnosis Date    Anemia     Intractable migraine without aura and without status migrainosus 2017    Kidney stones     Postpartum depression 2018    Precordial pain 2019    Unspecified asthma(493.90)      Past Surgical History:   Procedure Laterality Date     SECTION      DILATION AND CURETTAGE OF UTERUS N/A 2019    SUCTION DILATATION AND CURETTAGE performed by Amena Alexander MD at [facility]      laparascopic sx    TONSILLECTOMY       OB History        6    Para   4    Term   3       1    AB   1    Living   4       SAB   1    TAB   0    Ectopic   0    Molar        Multiple   0    Live Births   4                 Family History   Problem Relation Age of Onset    Asthma Mother     High Blood Pressure Mother     Asthma Father     Heart Disease Maternal Grandmother     Asthma Maternal Grandmother     Diabetes Maternal Grandmother     Kidney Disease Maternal Grandmother     Other Maternal Grandmother     Cancer Paternal Grandfather      Social History     Socioeconomic History    Marital status: Legally      Spouse name: Not on file    Number of children: Not on file    Years of education: Not on file    Highest education level: Not on file   Occupational History    Not on file   Social Needs    Financial resource strain: Not on file    Food insecurity:     Worry: Not on file     Inability: Not on file    Transportation needs:     Medical: Not on file     Non-medical: Not on file   Tobacco Use    Smoking status: Former Smoker     Packs/day: 0.50     Years: 20.00     Pack years: 10.00     Types: Cigarettes     Start date: 1/19/1997    Smokeless tobacco: Never Used   Substance and Sexual Activity    Alcohol use: No     Alcohol/week: 0.0 standard drinks    Drug use: No    Sexual activity: Yes     Partners: Male   Lifestyle    Physical activity:     Days per week: Not on file     Minutes per session: Not on file    Stress: Not on file   Relationships    Social connections:     Talks on phone: Not on file     Gets together: Not on file     Attends Judaism service: Not on file     Active member of club or organization: Not on file     Attends meetings of clubs or organizations: Not on file     Relationship status: Not on file    Intimate partner violence:     Fear of current or ex partner: Not on file     Emotionally abused: Not on file     Physically abused: Not on file     Forced sexual activity: Not on file   Other Topics Concern    Not on file   Social History Narrative    Not on file       Contraceptive method:  none    Patient's medications, allergies, past medical, surgical,social and family histories were reviewed and updated as appropriate. Review of Systems  Review of Systems   All other systems reviewed and are negative.     Physical exam :   General Appearance: alert and oriented to person, place and time, well-developedand well-nourished, in no acute distress  Skin: warm and dry, no rash or erythema  Eyes: pupils equal, round, and reactive to light, extraocular eye movements intact,conjunctivae normal  Neurological : A & O X 3   Pelvic: Deferred    Results:   Results for orders placed or performed during the hospital encounter of 12/10/19   Urine Culture   Result Value Ref Range    Urine Culture, Routine       >50,000 CFU/ml of mixed [place]  Multiple organisms isolated, no predominance. Culture  indicates probable contamination. Please review colony count  and clinical indications to determine if a repeat culture is  necessary. No further workup to be done.      CBC Auto Differential   Result Value Ref Range    WBC 10.2 4.8 - 10.8 K/uL    RBC 4.19 (L) 4.20 - 5.40 M/uL    Hemoglobin 13.8 12.0 - 16.0 g/dL    Hematocrit 40.6 37.0 - 47.0 %    MCV 96.7 82.0 - 100.0 fL    MCH 32.9 (H) 27.0 - 31.3 pg    MCHC 34.0 33.0 - 37.0 %    RDW 12.9 11.5 - 14.5 %    Platelets 333 411 - 933 K/uL    Neutrophils % 57.5 %    Lymphocytes % 34.2 %    Monocytes % 5.6 %    Eosinophils % 1.9 %    Basophils % 0.8 %    Neutrophils Absolute 5.9 1.4 - 6.5 K/uL    Lymphocytes Absolute 3.5 1.0 - 4.8 K/uL    Monocytes Absolute 0.6 0.2 - 0.8 K/uL    Eosinophils Absolute 0.2 0.0 - 0.7 K/uL    Basophils Absolute 0.1 0.0 - 0.2 K/uL   Comprehensive Metabolic Panel   Result Value Ref Range    Sodium 136 135 - 144 mEq/L    Potassium 4.6 3.4 - 4.9 mEq/L    Chloride 99 95 - 107 mEq/L    CO2 23 20 - 31 mEq/L    Anion Gap 14 9 - 15 mEq/L    Glucose 83 70 - 99 mg/dL    BUN 3 (L) 6 - 20 mg/dL    CREATININE 0.42 (L) 0.50 - 0.90 mg/dL    GFR Non-African American >60.0 >60    GFR  >60.0 >60    Calcium 9.5 8.5 - 9.9 mg/dL    Total Protein 8.2 (H) 6.3 - 8.0 g/dL    Alb 4.8 (H) 3.5 - 4.6 g/dL    Total Bilirubin <0.2 0.2 - 0.7 mg/dL    Alkaline Phosphatase 35 (L) 40 - 130 U/L    ALT 26 0 - 33 U/L    AST 30 0 - 35 U/L    Globulin 3.4 2.3 - 3.5 g/dL   Protime-INR   Result Value Ref Range    Protime 13.8 12.3 - 14.9 sec    INR 1.0    APTT   Result Value Ref Range    aPTT 32.9 24.4 - 36.8 sec   Urinalysis Reflex to Culture   Result Value Ref Range    Color, UA Yellow Straw/Yellow    Clarity, UA CLOUDY (A) Clear    Glucose, Ur Negative Negative mg/dL    Bilirubin Urine Negative Negative    Ketones, Urine Negative Negative mg/dL    Specific [place], UA CM PROBABLE RIGHT OVARIAN CORPUS LUTEUM CYST. NO SIGNIFICANT FREE FLUID, OR OTHER COMPLICATION IDENTIFIED. Us Ob Transvaginal    Result Date: 12/10/2019  US OB LESS THAN 14 WEEKS SINGLE OR FIRST GESTATION, US OB TRANSVAGINAL: 12/10/2019 CLINICAL HISTORY:  bleeding; no prenal care . COMPARISON: There are no prior studies available for this pregnancy. TECHNIQUE: Transabdominal imaging was obtained to evaluate the entirety of the pelvis. Transvaginal scanning was performed for detailed analysis of the uterus and ovaries. FINDINGS: A gestational sac is noted within the fundal aspect of the central endometrial canal without significant surrounding subchorionic hemorrhage. A fetal pole is noted measuring approximately 2.5 mm in length, which corresponds to 9 weeks 2 days, +/- 4 days. Fetal cardiac activity is not observed, consistent with fetal demise. An approximately 1.9 x 1.2 x 1.3 cm simple cyst within an otherwise normal-appearing right ovary is most likely a corpus luteum. There is no free fluid, or other findings of concern identified. The uterus measures approximately 8.5 x 5.6 x 6.5 cm. The lower uterine segment and cervix are closed. The right ovary measures 3.1 x 1.8 x 1.4 cm. The left ovary measures 3.3 x 2.1 x 1.7 cm. There is equivalent blood flow on Doppler analysis. FETAL DEMISE OF AN EARLY INTRAUTERINE PREGNANCY CORRESPONDING TO 9 WEEKS 2 DAYS. APPROXIMATELY 2 CM PROBABLE RIGHT OVARIAN CORPUS LUTEUM CYST. NO SIGNIFICANT FREE FLUID, OR OTHER COMPLICATION IDENTIFIED. Assessment:      Diagnosis Orders   1.  S/P D&C (status post dilation and curettage)  HCG, Quantitative, Pregnancy          Plan:     Doing well postop   Discussed results with patient   HCG ordered  Patient scheduled for hysterectomy due to AUB soon ,.    Orders Placed This Encounter   Procedures    HCG, Quantitative, Pregnancy     Standing Status:   Future     Number of Occurrences:   1     Standing Expiration Date:   1/3/2021 No orders of the defined types were placed in this encounter. Follow up:  Return for scheduled for surgery. Risks, benefits and alternative therapies fortreatment discussed. Pt elects surgical treatment for therapy.          Norma Dunn MD

## 2020-01-06 ENCOUNTER — TELEPHONE (OUTPATIENT)
Dept: OBGYN CLINIC | Age: 30
End: 2020-01-06

## 2020-01-06 ENCOUNTER — TELEPHONE (OUTPATIENT)
Dept: FAMILY MEDICINE CLINIC | Age: 30
End: 2020-01-06

## 2020-01-06 RX ORDER — SUMATRIPTAN 100 MG/1
100 TABLET, FILM COATED ORAL
Qty: 9 TABLET | Refills: 5 | Status: SHIPPED | OUTPATIENT
Start: 2020-01-06 | End: 2020-06-29 | Stop reason: ALTCHOICE

## 2020-01-06 NOTE — TELEPHONE ENCOUNTER
Talked to mom and she was given the providers message. Patient also has an appointment in feb with neurology. I let mom know that she might want to call and see if she can get in sooner.

## 2020-01-06 NOTE — TELEPHONE ENCOUNTER
I called her a higher dose of the sumatriptan in. But she should not take it anymore than twice a week. Have also put her on daily magnesium.   We did she see her neurologist?

## 2020-01-06 NOTE — TELEPHONE ENCOUNTER
Patient calling into the office stating that the Sumatriptan is not even touching her migraines, and would like something else.

## 2020-01-07 ENCOUNTER — HOSPITAL ENCOUNTER (OUTPATIENT)
Dept: NON INVASIVE DIAGNOSTICS | Age: 30
Discharge: HOME | End: 2020-01-07
Payer: COMMERCIAL

## 2020-01-07 PROCEDURE — 93017 CV STRESS TEST TRACING ONLY: CPT

## 2020-01-07 PROCEDURE — 93350 STRESS TTE ONLY: CPT

## 2020-01-07 NOTE — PROGRESS NOTES
Hx,allergies and medications reviewed. Procedure explained and informed consent obtained. 1119 Baseline four echo images started. Tolerated treadmill well for 9:38 minutes. Reached 74% target HR. Patient gave max effort,felt dizzy and asked to stop treadmill. For patient safety exam was terminated    Final four echo images obtained post exercise. Vertigo reported. Returned to baseline in recovery. Denies chest pain or pressure. EKG shows S Hardik no ectopy.

## 2020-01-10 ENCOUNTER — TELEPHONE (OUTPATIENT)
Dept: FAMILY MEDICINE CLINIC | Age: 30
End: 2020-01-10

## 2020-01-10 RX ORDER — SUMATRIPTAN 6 MG/.5ML
6 INJECTION, SOLUTION SUBCUTANEOUS
Qty: 0.5 ML | Refills: 3 | Status: SHIPPED | OUTPATIENT
Start: 2020-01-10 | End: 2020-06-29 | Stop reason: SDUPTHER

## 2020-01-14 ENCOUNTER — TELEPHONE (OUTPATIENT)
Dept: FAMILY MEDICINE CLINIC | Age: 30
End: 2020-01-14

## 2020-01-17 ENCOUNTER — HOSPITAL ENCOUNTER (OUTPATIENT)
Dept: PREADMISSION TESTING | Age: 30
Discharge: HOME | End: 2020-01-21
Payer: COMMERCIAL

## 2020-01-17 VITALS
TEMPERATURE: 97 F | DIASTOLIC BLOOD PRESSURE: 75 MMHG | RESPIRATION RATE: 16 BRPM | SYSTOLIC BLOOD PRESSURE: 106 MMHG | HEART RATE: 70 BPM | BODY MASS INDEX: 19.04 KG/M2 | OXYGEN SATURATION: 98 % | HEIGHT: 60 IN | WEIGHT: 97 LBS

## 2020-01-17 LAB
ABO/RH: NORMAL
ANION GAP SERPL CALCULATED.3IONS-SCNC: 13 MEQ/L (ref 9–15)
ANTIBODY SCREEN: NORMAL
BUN BLDV-MCNC: 7 MG/DL (ref 6–20)
CALCIUM SERPL-MCNC: 9.6 MG/DL (ref 8.5–9.9)
CHLORIDE BLD-SCNC: 106 MEQ/L (ref 95–107)
CO2: 18 MEQ/L (ref 20–31)
CREAT SERPL-MCNC: 0.69 MG/DL (ref 0.5–0.9)
GFR AFRICAN AMERICAN: >60
GFR NON-AFRICAN AMERICAN: >60
GLUCOSE BLD-MCNC: 99 MG/DL (ref 70–99)
HCT VFR BLD CALC: 36.6 % (ref 37–47)
HEMOGLOBIN: 12.8 G/DL (ref 12–16)
MCH RBC QN AUTO: 34 PG (ref 27–31.3)
MCHC RBC AUTO-ENTMCNC: 34.8 % (ref 33–37)
MCV RBC AUTO: 97.5 FL (ref 82–100)
PDW BLD-RTO: 12.7 % (ref 11.5–14.5)
PLATELET # BLD: 261 K/UL (ref 130–400)
POTASSIUM SERPL-SCNC: 4.7 MEQ/L (ref 3.4–4.9)
RBC # BLD: 3.76 M/UL (ref 4.2–5.4)
SODIUM BLD-SCNC: 137 MEQ/L (ref 135–144)
WBC # BLD: 9 K/UL (ref 4.8–10.8)

## 2020-01-17 PROCEDURE — 80048 BASIC METABOLIC PNL TOTAL CA: CPT

## 2020-01-17 PROCEDURE — 86901 BLOOD TYPING SEROLOGIC RH(D): CPT

## 2020-01-17 PROCEDURE — 85027 COMPLETE CBC AUTOMATED: CPT

## 2020-01-17 PROCEDURE — 86850 RBC ANTIBODY SCREEN: CPT

## 2020-01-17 PROCEDURE — 86900 BLOOD TYPING SEROLOGIC ABO: CPT

## 2020-01-17 RX ORDER — SODIUM CHLORIDE 0.9 % (FLUSH) 0.9 %
10 SYRINGE (ML) INJECTION PRN
Status: CANCELLED | OUTPATIENT
Start: 2020-01-23

## 2020-01-17 RX ORDER — SODIUM CHLORIDE 0.9 % (FLUSH) 0.9 %
10 SYRINGE (ML) INJECTION EVERY 12 HOURS SCHEDULED
Status: CANCELLED | OUTPATIENT
Start: 2020-01-23

## 2020-01-17 RX ORDER — CLINDAMYCIN PHOSPHATE 900 MG/50ML
900 INJECTION INTRAVENOUS
Status: CANCELLED | OUTPATIENT
Start: 2020-01-23 | End: 2020-01-23

## 2020-01-17 RX ORDER — LIDOCAINE HYDROCHLORIDE 10 MG/ML
1 INJECTION, SOLUTION EPIDURAL; INFILTRATION; INTRACAUDAL; PERINEURAL
Status: CANCELLED | OUTPATIENT
Start: 2020-01-23 | End: 2020-01-23

## 2020-01-17 RX ORDER — SODIUM CHLORIDE, SODIUM LACTATE, POTASSIUM CHLORIDE, CALCIUM CHLORIDE 600; 310; 30; 20 MG/100ML; MG/100ML; MG/100ML; MG/100ML
INJECTION, SOLUTION INTRAVENOUS CONTINUOUS
Status: CANCELLED | OUTPATIENT
Start: 2020-01-23

## 2020-01-23 ENCOUNTER — ANESTHESIA (OUTPATIENT)
Dept: OPERATING ROOM | Age: 30
End: 2020-01-23
Payer: COMMERCIAL

## 2020-01-23 ENCOUNTER — ANESTHESIA EVENT (OUTPATIENT)
Dept: OPERATING ROOM | Age: 30
End: 2020-01-23
Payer: COMMERCIAL

## 2020-01-23 ENCOUNTER — HOSPITAL ENCOUNTER (OUTPATIENT)
Age: 30
Setting detail: OUTPATIENT SURGERY
Discharge: HOME | End: 2020-01-23
Attending: OBSTETRICS & GYNECOLOGY | Admitting: OBSTETRICS & GYNECOLOGY
Payer: COMMERCIAL

## 2020-01-23 VITALS — OXYGEN SATURATION: 100 % | TEMPERATURE: 95.2 F | SYSTOLIC BLOOD PRESSURE: 135 MMHG | DIASTOLIC BLOOD PRESSURE: 66 MMHG

## 2020-01-23 VITALS
WEIGHT: 97 LBS | BODY MASS INDEX: 19.04 KG/M2 | RESPIRATION RATE: 16 BRPM | TEMPERATURE: 98.7 F | HEART RATE: 62 BPM | DIASTOLIC BLOOD PRESSURE: 83 MMHG | SYSTOLIC BLOOD PRESSURE: 151 MMHG | HEIGHT: 60 IN | OXYGEN SATURATION: 98 %

## 2020-01-23 LAB
HCG, URINE, POC: NEGATIVE
Lab: NORMAL
NEGATIVE QC PASS/FAIL: NORMAL
POSITIVE QC PASS/FAIL: NORMAL

## 2020-01-23 PROCEDURE — 7100000001 HC PACU RECOVERY - ADDTL 15 MIN: Performed by: OBSTETRICS & GYNECOLOGY

## 2020-01-23 PROCEDURE — 7100000011 HC PHASE II RECOVERY - ADDTL 15 MIN: Performed by: OBSTETRICS & GYNECOLOGY

## 2020-01-23 PROCEDURE — 58571 TLH W/T/O 250 G OR LESS: CPT | Performed by: OBSTETRICS & GYNECOLOGY

## 2020-01-23 PROCEDURE — 2580000003 HC RX 258: Performed by: NURSE ANESTHETIST, CERTIFIED REGISTERED

## 2020-01-23 PROCEDURE — C1760 CLOSURE DEV, VASC: HCPCS | Performed by: OBSTETRICS & GYNECOLOGY

## 2020-01-23 PROCEDURE — 6370000000 HC RX 637 (ALT 250 FOR IP): Performed by: ANESTHESIOLOGY

## 2020-01-23 PROCEDURE — 6360000002 HC RX W HCPCS: Performed by: OBSTETRICS & GYNECOLOGY

## 2020-01-23 PROCEDURE — 2500000003 HC RX 250 WO HCPCS: Performed by: OBSTETRICS & GYNECOLOGY

## 2020-01-23 PROCEDURE — 2500000003 HC RX 250 WO HCPCS: Performed by: NURSE PRACTITIONER

## 2020-01-23 PROCEDURE — 3600000014 HC SURGERY LEVEL 4 ADDTL 15MIN: Performed by: OBSTETRICS & GYNECOLOGY

## 2020-01-23 PROCEDURE — 7100000000 HC PACU RECOVERY - FIRST 15 MIN: Performed by: OBSTETRICS & GYNECOLOGY

## 2020-01-23 PROCEDURE — 2500000003 HC RX 250 WO HCPCS: Performed by: ANESTHESIOLOGY

## 2020-01-23 PROCEDURE — 7100000010 HC PHASE II RECOVERY - FIRST 15 MIN: Performed by: OBSTETRICS & GYNECOLOGY

## 2020-01-23 PROCEDURE — 2500000003 HC RX 250 WO HCPCS: Performed by: NURSE ANESTHETIST, CERTIFIED REGISTERED

## 2020-01-23 PROCEDURE — 88307 TISSUE EXAM BY PATHOLOGIST: CPT

## 2020-01-23 PROCEDURE — 76942 ECHO GUIDE FOR BIOPSY: CPT | Performed by: ANESTHESIOLOGY

## 2020-01-23 PROCEDURE — 6360000002 HC RX W HCPCS: Performed by: ANESTHESIOLOGY

## 2020-01-23 PROCEDURE — 6370000000 HC RX 637 (ALT 250 FOR IP)

## 2020-01-23 PROCEDURE — 3700000001 HC ADD 15 MINUTES (ANESTHESIA): Performed by: OBSTETRICS & GYNECOLOGY

## 2020-01-23 PROCEDURE — 2580000003 HC RX 258: Performed by: OBSTETRICS & GYNECOLOGY

## 2020-01-23 PROCEDURE — 3600000004 HC SURGERY LEVEL 4 BASE: Performed by: OBSTETRICS & GYNECOLOGY

## 2020-01-23 PROCEDURE — 2720000010 HC SURG SUPPLY STERILE: Performed by: OBSTETRICS & GYNECOLOGY

## 2020-01-23 PROCEDURE — 2580000003 HC RX 258: Performed by: NURSE PRACTITIONER

## 2020-01-23 PROCEDURE — 6370000000 HC RX 637 (ALT 250 FOR IP): Performed by: OBSTETRICS & GYNECOLOGY

## 2020-01-23 PROCEDURE — 3700000000 HC ANESTHESIA ATTENDED CARE: Performed by: OBSTETRICS & GYNECOLOGY

## 2020-01-23 PROCEDURE — 6360000002 HC RX W HCPCS: Performed by: NURSE ANESTHETIST, CERTIFIED REGISTERED

## 2020-01-23 PROCEDURE — 2709999900 HC NON-CHARGEABLE SUPPLY: Performed by: OBSTETRICS & GYNECOLOGY

## 2020-01-23 RX ORDER — LIDOCAINE HYDROCHLORIDE AND EPINEPHRINE 10; 10 MG/ML; UG/ML
INJECTION, SOLUTION INFILTRATION; PERINEURAL PRN
Status: DISCONTINUED | OUTPATIENT
Start: 2020-01-23 | End: 2020-01-23 | Stop reason: SDUPTHER

## 2020-01-23 RX ORDER — DOCUSATE SODIUM 100 MG/1
100 CAPSULE, LIQUID FILLED ORAL 2 TIMES DAILY
Status: DISCONTINUED | OUTPATIENT
Start: 2020-01-23 | End: 2020-01-23 | Stop reason: HOSPADM

## 2020-01-23 RX ORDER — FENTANYL CITRATE 50 UG/ML
25 INJECTION, SOLUTION INTRAMUSCULAR; INTRAVENOUS EVERY 10 MIN PRN
Status: DISCONTINUED | OUTPATIENT
Start: 2020-01-23 | End: 2020-01-23 | Stop reason: HOSPADM

## 2020-01-23 RX ORDER — ROCURONIUM BROMIDE 10 MG/ML
INJECTION, SOLUTION INTRAVENOUS PRN
Status: DISCONTINUED | OUTPATIENT
Start: 2020-01-23 | End: 2020-01-23 | Stop reason: SDUPTHER

## 2020-01-23 RX ORDER — SODIUM CHLORIDE, SODIUM LACTATE, POTASSIUM CHLORIDE, CALCIUM CHLORIDE 600; 310; 30; 20 MG/100ML; MG/100ML; MG/100ML; MG/100ML
INJECTION, SOLUTION INTRAVENOUS CONTINUOUS PRN
Status: DISCONTINUED | OUTPATIENT
Start: 2020-01-23 | End: 2020-01-23 | Stop reason: SDUPTHER

## 2020-01-23 RX ORDER — ONDANSETRON 2 MG/ML
4 INJECTION INTRAMUSCULAR; INTRAVENOUS
Status: DISCONTINUED | OUTPATIENT
Start: 2020-01-23 | End: 2020-01-23 | Stop reason: HOSPADM

## 2020-01-23 RX ORDER — SODIUM CHLORIDE 0.9 % (FLUSH) 0.9 %
10 SYRINGE (ML) INJECTION PRN
Status: DISCONTINUED | OUTPATIENT
Start: 2020-01-23 | End: 2020-01-23 | Stop reason: HOSPADM

## 2020-01-23 RX ORDER — DOCUSATE SODIUM 100 MG/1
100 CAPSULE, LIQUID FILLED ORAL 2 TIMES DAILY PRN
Qty: 60 CAPSULE | Refills: 2 | Status: SHIPPED | OUTPATIENT
Start: 2020-01-23 | End: 2020-06-29 | Stop reason: ALTCHOICE

## 2020-01-23 RX ORDER — PROPOFOL 10 MG/ML
INJECTION, EMULSION INTRAVENOUS PRN
Status: DISCONTINUED | OUTPATIENT
Start: 2020-01-23 | End: 2020-01-23 | Stop reason: SDUPTHER

## 2020-01-23 RX ORDER — LIDOCAINE HYDROCHLORIDE 20 MG/ML
INJECTION, SOLUTION INTRAVENOUS PRN
Status: DISCONTINUED | OUTPATIENT
Start: 2020-01-23 | End: 2020-01-23 | Stop reason: SDUPTHER

## 2020-01-23 RX ORDER — TRAMADOL HYDROCHLORIDE 50 MG/1
25 TABLET ORAL ONCE
Status: COMPLETED | OUTPATIENT
Start: 2020-01-23 | End: 2020-01-23

## 2020-01-23 RX ORDER — ONDANSETRON 2 MG/ML
INJECTION INTRAMUSCULAR; INTRAVENOUS PRN
Status: DISCONTINUED | OUTPATIENT
Start: 2020-01-23 | End: 2020-01-23 | Stop reason: SDUPTHER

## 2020-01-23 RX ORDER — MAGNESIUM HYDROXIDE 1200 MG/15ML
LIQUID ORAL CONTINUOUS PRN
Status: COMPLETED | OUTPATIENT
Start: 2020-01-23 | End: 2020-01-23

## 2020-01-23 RX ORDER — ACETAMINOPHEN 325 MG/1
650 TABLET ORAL EVERY 4 HOURS PRN
Status: DISCONTINUED | OUTPATIENT
Start: 2020-01-23 | End: 2020-01-23 | Stop reason: HOSPADM

## 2020-01-23 RX ORDER — ROPIVACAINE HYDROCHLORIDE 5 MG/ML
INJECTION, SOLUTION EPIDURAL; INFILTRATION; PERINEURAL PRN
Status: DISCONTINUED | OUTPATIENT
Start: 2020-01-23 | End: 2020-01-23 | Stop reason: SDUPTHER

## 2020-01-23 RX ORDER — SODIUM CHLORIDE 0.9 % (FLUSH) 0.9 %
10 SYRINGE (ML) INJECTION EVERY 12 HOURS SCHEDULED
Status: DISCONTINUED | OUTPATIENT
Start: 2020-01-23 | End: 2020-01-23 | Stop reason: HOSPADM

## 2020-01-23 RX ORDER — LIDOCAINE HYDROCHLORIDE 10 MG/ML
1 INJECTION, SOLUTION EPIDURAL; INFILTRATION; INTRACAUDAL; PERINEURAL
Status: COMPLETED | OUTPATIENT
Start: 2020-01-23 | End: 2020-01-23

## 2020-01-23 RX ORDER — SIMETHICONE 80 MG
80 TABLET,CHEWABLE ORAL 4 TIMES DAILY PRN
Qty: 180 TABLET | Refills: 1 | Status: SHIPPED | OUTPATIENT
Start: 2020-01-23

## 2020-01-23 RX ORDER — METOCLOPRAMIDE HYDROCHLORIDE 5 MG/ML
10 INJECTION INTRAMUSCULAR; INTRAVENOUS
Status: DISCONTINUED | OUTPATIENT
Start: 2020-01-23 | End: 2020-01-23 | Stop reason: HOSPADM

## 2020-01-23 RX ORDER — CLINDAMYCIN PHOSPHATE 900 MG/50ML
900 INJECTION INTRAVENOUS
Status: COMPLETED | OUTPATIENT
Start: 2020-01-23 | End: 2020-01-23

## 2020-01-23 RX ORDER — TRAMADOL HYDROCHLORIDE 50 MG/1
50 TABLET ORAL EVERY 6 HOURS PRN
Qty: 12 TABLET | Refills: 0 | Status: SHIPPED | OUTPATIENT
Start: 2020-01-23 | End: 2020-01-26

## 2020-01-23 RX ORDER — SIMETHICONE 80 MG
80 TABLET,CHEWABLE ORAL 4 TIMES DAILY PRN
Qty: 180 TABLET | Refills: 1 | Status: SHIPPED | OUTPATIENT
Start: 2020-01-23 | End: 2020-06-29 | Stop reason: SDUPTHER

## 2020-01-23 RX ORDER — ACETAMINOPHEN 500 MG
1000 TABLET ORAL EVERY 6 HOURS PRN
Qty: 60 TABLET | Refills: 1 | Status: SHIPPED | OUTPATIENT
Start: 2020-01-23 | End: 2020-06-29

## 2020-01-23 RX ORDER — SODIUM CHLORIDE, SODIUM LACTATE, POTASSIUM CHLORIDE, CALCIUM CHLORIDE 600; 310; 30; 20 MG/100ML; MG/100ML; MG/100ML; MG/100ML
INJECTION, SOLUTION INTRAVENOUS CONTINUOUS
Status: DISCONTINUED | OUTPATIENT
Start: 2020-01-23 | End: 2020-01-23 | Stop reason: HOSPADM

## 2020-01-23 RX ORDER — MEPERIDINE HYDROCHLORIDE 25 MG/ML
12.5 INJECTION INTRAMUSCULAR; INTRAVENOUS; SUBCUTANEOUS EVERY 5 MIN PRN
Status: DISCONTINUED | OUTPATIENT
Start: 2020-01-23 | End: 2020-01-23 | Stop reason: HOSPADM

## 2020-01-23 RX ORDER — DEXAMETHASONE SODIUM PHOSPHATE 10 MG/ML
INJECTION INTRAMUSCULAR; INTRAVENOUS PRN
Status: DISCONTINUED | OUTPATIENT
Start: 2020-01-23 | End: 2020-01-23 | Stop reason: SDUPTHER

## 2020-01-23 RX ORDER — DIPHENHYDRAMINE HYDROCHLORIDE 50 MG/ML
12.5 INJECTION INTRAMUSCULAR; INTRAVENOUS
Status: DISCONTINUED | OUTPATIENT
Start: 2020-01-23 | End: 2020-01-23 | Stop reason: HOSPADM

## 2020-01-23 RX ORDER — MIDAZOLAM HYDROCHLORIDE 1 MG/ML
INJECTION INTRAMUSCULAR; INTRAVENOUS PRN
Status: DISCONTINUED | OUTPATIENT
Start: 2020-01-23 | End: 2020-01-23 | Stop reason: SDUPTHER

## 2020-01-23 RX ORDER — ONDANSETRON 2 MG/ML
4 INJECTION INTRAMUSCULAR; INTRAVENOUS EVERY 6 HOURS PRN
Status: DISCONTINUED | OUTPATIENT
Start: 2020-01-23 | End: 2020-01-23 | Stop reason: HOSPADM

## 2020-01-23 RX ADMIN — CLINDAMYCIN IN 5 PERCENT DEXTROSE 900 MG: 18 INJECTION, SOLUTION INTRAVENOUS at 09:10

## 2020-01-23 RX ADMIN — LIDOCAINE HYDROCHLORIDE AND EPINEPHRINE 15 ML: 10; 10 INJECTION, SOLUTION INFILTRATION; PERINEURAL at 08:57

## 2020-01-23 RX ADMIN — SUGAMMADEX 88 MG: 100 INJECTION, SOLUTION INTRAVENOUS at 10:33

## 2020-01-23 RX ADMIN — SODIUM CHLORIDE, POTASSIUM CHLORIDE, SODIUM LACTATE AND CALCIUM CHLORIDE: 600; 310; 30; 20 INJECTION, SOLUTION INTRAVENOUS at 10:01

## 2020-01-23 RX ADMIN — ROCURONIUM BROMIDE 10 MG: 10 INJECTION INTRAVENOUS at 09:31

## 2020-01-23 RX ADMIN — LIDOCAINE HYDROCHLORIDE 0.1 ML: 10 INJECTION, SOLUTION EPIDURAL; INFILTRATION; INTRACAUDAL; PERINEURAL at 08:45

## 2020-01-23 RX ADMIN — GENTAMICIN SULFATE 66 MG: 40 INJECTION, SOLUTION INTRAMUSCULAR; INTRAVENOUS at 09:20

## 2020-01-23 RX ADMIN — DEXAMETHASONE SODIUM PHOSPHATE 10 MG: 10 INJECTION INTRAMUSCULAR; INTRAVENOUS at 09:45

## 2020-01-23 RX ADMIN — SODIUM CHLORIDE, POTASSIUM CHLORIDE, SODIUM LACTATE AND CALCIUM CHLORIDE: 600; 310; 30; 20 INJECTION, SOLUTION INTRAVENOUS at 08:45

## 2020-01-23 RX ADMIN — PHENYLEPHRINE HYDROCHLORIDE 100 MCG: 10 INJECTION INTRAVENOUS at 09:34

## 2020-01-23 RX ADMIN — LIDOCAINE HYDROCHLORIDE 50 MG: 20 INJECTION, SOLUTION INTRAVENOUS at 09:19

## 2020-01-23 RX ADMIN — MIDAZOLAM HYDROCHLORIDE 2 MG: 2 INJECTION, SOLUTION INTRAMUSCULAR; INTRAVENOUS at 08:52

## 2020-01-23 RX ADMIN — ROCURONIUM BROMIDE 40 MG: 10 INJECTION INTRAVENOUS at 09:19

## 2020-01-23 RX ADMIN — ONDANSETRON 4 MG: 2 INJECTION INTRAMUSCULAR; INTRAVENOUS at 09:45

## 2020-01-23 RX ADMIN — PROPOFOL 130 MG: 10 INJECTION, EMULSION INTRAVENOUS at 09:19

## 2020-01-23 RX ADMIN — FENTANYL CITRATE 25 MCG: 50 INJECTION, SOLUTION INTRAMUSCULAR; INTRAVENOUS at 11:30

## 2020-01-23 RX ADMIN — MEPERIDINE HYDROCHLORIDE 12.5 MG: 25 INJECTION INTRAMUSCULAR; INTRAVENOUS; SUBCUTANEOUS at 10:50

## 2020-01-23 RX ADMIN — TRAMADOL HYDROCHLORIDE 25 MG: 50 TABLET ORAL at 13:25

## 2020-01-23 RX ADMIN — ROPIVACAINE HYDROCHLORIDE 20 ML: 5 INJECTION, SOLUTION EPIDURAL; INFILTRATION; PERINEURAL at 08:57

## 2020-01-23 ASSESSMENT — PULMONARY FUNCTION TESTS
PIF_VALUE: 18
PIF_VALUE: 15
PIF_VALUE: 18
PIF_VALUE: 13
PIF_VALUE: 13
PIF_VALUE: 14
PIF_VALUE: 1
PIF_VALUE: 17
PIF_VALUE: 13
PIF_VALUE: 18
PIF_VALUE: 14
PIF_VALUE: 13
PIF_VALUE: 19
PIF_VALUE: 18
PIF_VALUE: 18
PIF_VALUE: 14
PIF_VALUE: 18
PIF_VALUE: 12
PIF_VALUE: 4
PIF_VALUE: 19
PIF_VALUE: 18
PIF_VALUE: 18
PIF_VALUE: 14
PIF_VALUE: 12
PIF_VALUE: 13
PIF_VALUE: 3
PIF_VALUE: 0
PIF_VALUE: 18
PIF_VALUE: 13
PIF_VALUE: 1
PIF_VALUE: 13
PIF_VALUE: 13
PIF_VALUE: 18
PIF_VALUE: 13
PIF_VALUE: 14
PIF_VALUE: 13
PIF_VALUE: 15
PIF_VALUE: 1
PIF_VALUE: 18
PIF_VALUE: 16
PIF_VALUE: 16
PIF_VALUE: 18
PIF_VALUE: 19
PIF_VALUE: 18
PIF_VALUE: 2
PIF_VALUE: 17
PIF_VALUE: 12
PIF_VALUE: 18
PIF_VALUE: 13
PIF_VALUE: 13
PIF_VALUE: 7
PIF_VALUE: 14
PIF_VALUE: 18
PIF_VALUE: 17
PIF_VALUE: 1
PIF_VALUE: 13
PIF_VALUE: 18
PIF_VALUE: 17
PIF_VALUE: 0
PIF_VALUE: 13
PIF_VALUE: 3
PIF_VALUE: 10
PIF_VALUE: 14
PIF_VALUE: 3
PIF_VALUE: 14
PIF_VALUE: 18
PIF_VALUE: 7
PIF_VALUE: 17
PIF_VALUE: 0
PIF_VALUE: 18
PIF_VALUE: 17
PIF_VALUE: 10
PIF_VALUE: 19
PIF_VALUE: 18
PIF_VALUE: 24
PIF_VALUE: 12
PIF_VALUE: 17
PIF_VALUE: 18
PIF_VALUE: 12
PIF_VALUE: 13
PIF_VALUE: 18
PIF_VALUE: 13
PIF_VALUE: 13

## 2020-01-23 ASSESSMENT — PAIN DESCRIPTION - LOCATION
LOCATION: ABDOMEN
LOCATION: ABDOMEN

## 2020-01-23 ASSESSMENT — PAIN SCALES - GENERAL
PAINLEVEL_OUTOF10: 8
PAINLEVEL_OUTOF10: 6
PAINLEVEL_OUTOF10: 7
PAINLEVEL_OUTOF10: 6

## 2020-01-23 ASSESSMENT — PAIN DESCRIPTION - PAIN TYPE
TYPE: SURGICAL PAIN
TYPE: SURGICAL PAIN

## 2020-01-23 ASSESSMENT — LIFESTYLE VARIABLES: SMOKING_STATUS: 1

## 2020-01-23 ASSESSMENT — PAIN - FUNCTIONAL ASSESSMENT: PAIN_FUNCTIONAL_ASSESSMENT: 0-10

## 2020-01-23 NOTE — PROGRESS NOTES
To phase 2, states need to void immediately. ,  Assisted to bathroom, juliana pad clean and dry.   Mesh panties provided to pt

## 2020-01-23 NOTE — BRIEF OP NOTE
Brief Postoperative Note  ______________________________________________________________    Patient: Melvina Grayson  YOB: 1990  MRN: [id number]  Date of Procedure: 1/23/2020    Pre-Op Diagnosis: AUB    Post-Op Diagnosis: Same       Procedure(s):  TLH with bilateral salpingectomies    Anesthesia: Regional, General    Surgeon(s):  Grabiel Horn MD    Assistant: surgical staff     Estimated Blood Loss (mL): less than 10 cc    Complications: None    Specimens:   ID Type Source Tests Collected by Time Destination   A : uterus, bilateral tubes Tissue Uterus SURGICAL PATHOLOGY Grabiel Horn MD 1/23/2020 1023        Implants:  * No implants in log *      Drains:   Urethral Catheter Non-latex 16 fr (Active)       Findings: normal ovaries     Grabiel Horn MD  Date: 1/23/2020  Time: 10:26 AM

## 2020-01-23 NOTE — ANESTHESIA POSTPROCEDURE EVALUATION
Department of Anesthesiology  Postprocedure Note    Patient: Gaby Marlow  MRN: [id number]  YOB: 1990  Date of evaluation: 1/23/2020  Time:  10:51 AM     Procedure Summary     Date:  01/23/20 Room / Location:  [address]    Anesthesia Start:  0915 Anesthesia Stop:      Procedure:  TLH, BILATERAL SALPINGECTOMY (N/A Abdomen) Diagnosis:  (AUB)    Surgeon:  Andrea Downs MD Responsible Provider:  Ree Baum MD    Anesthesia Type:  regional, general ASA Status:  2          Anesthesia Type: regional, general    Alex Phase I:      Alex Phase II:      Last vitals: Reviewed and per EMR flowsheets.        Anesthesia Post Evaluation    Patient location during evaluation: PACU  Patient participation: complete - patient participated  Level of consciousness: awake and alert  Pain score: 0  Airway patency: patent  Nausea & Vomiting: no nausea  Complications: no  Cardiovascular status: hemodynamically stable  Respiratory status: acceptable  Hydration status: stable

## 2020-01-23 NOTE — ANESTHESIA PROCEDURE NOTES
Peripheral Block    Patient location during procedure: pre-op  Start time: 1/23/2020 8:54 AM  End time: 1/23/2020 9:00 AM  Staffing  Anesthesiologist: Jannet Rodriguez MD  Performed: anesthesiologist   Preanesthetic Checklist  Completed: patient identified, site marked, surgical consent, pre-op evaluation, timeout performed, IV checked, risks and benefits discussed, monitors and equipment checked, anesthesia consent given, oxygen available and patient being monitored  Peripheral Block  Patient position: sitting  Prep: ChloraPrep  Patient monitoring: cardiac monitor, continuous pulse ox, frequent blood pressure checks and IV access  Block type: Erector spinae  Laterality: bilateral  Injection technique: single-shot  Procedures: ultrasound guided and nerve stimulator  Local infiltration: ropivacaine and lidocaine  Infiltration strength: 0.5 %  Dose: 20 mL  Provider prep: mask and sterile gloves (Sterile probe cover)  Local infiltration: ropivacaine and lidocaine  Needle  Needle type: combined needle/nerve stimulator   Needle gauge: 22 G  Needle length: 5 cm  Needle localization: anatomical landmarks and ultrasound guidance  Assessment  Injection assessment: negative aspiration for heme, no paresthesia on injection and local visualized surrounding nerve on ultrasound  Paresthesia pain: immediately resolved  Slow fractionated injection: yes  Hemodynamics: stable  Additional Notes  Ultrasound image printed and saved in patient chart.     Sterile probe cover used    Reason for block: post-op pain management and at surgeon's request

## 2020-01-23 NOTE — OP NOTE
slightly below the level of the umbilicus and 8 cm lateral to the midline, a 12mm incision was made through which a 12 mm trocar was inserted, then on the same side approximately 15 cm lateral to the midline and at the level of the anterior superior iliac spine, a 5 mm incision was made through which a 5 mm trocar was inserted under direct visualization as well. On the right side, 12 cm lateral to the midline and 5 cm below the level of the umbilicus another 5 mm incision was made through which a 5 mm trocar was inserted. Patient was then placed in T-molina and a survey of the abdomen and pelvis was performed which revealed enlarged ovary with a left ovarian cyst .Ureters were identified bilaterally. The left utero-ovarian ligament was then grasped , coagulated and cut using the Harmonic ace device from [employer]. The bipolar cautery was also used on the field for any non hemostatic areas or bleeding pedicles and was used sequentially with the harmonic ace device. After the left utero-ovarian ligament , then the left round ligament was coagulated and cut, followed by the anterior and posterior leaf of the broad ligament which was coagulated and cut multiple times all the way down to the uterine vessels on the left side. The anterior leaf of the broad ligament was dissected all the way anteriorly over the lower uterine ligament on the left side, when the bladder flap was also dissected from that side as well. The posterior leaf was also dissected posteriorly arround the edge of the V-care cup. By completing the dissection of the broad ligament, the uterine vessels were skeletonized, i used the bipolar coagulator to grasp the vessels and were coagulated multiple times, then the harmonic ace was used and the vessels were transected completely, hemostasis was intact. Same steps were carried out on the right side all the way down to the uterine vessels which were coagulated and cut in same fashion.  The bladder flap dissection was then completed from the right side, and the blasser was pushed down in the pelvis completely freeing the anterior lower uterine segment. The v-care cup edge was then seen clearly arround the vaginal cuff. Uterus was noted to have blanched at that time. I then used the monopolar spatula to cut through the vaginal cuff surrounding the cervix and uterus, arround the edge of the v-care cup used as guidance , pushing up on the v-care cup during the procedure to prevent any ureteral injury. After the uterus and cervix were completely freed from surrounding vaginal tissue, the uterus was exteriorized vaginally. Attention was then towards the right and left fallopian tubes were then removed using the harmonic ace device cutting through mesosalpinx. The right and left ovary were preserved. The vaginal cuff was then closed using 0-Vicryl sutures , in an interrupted fashion which were tied utilizing extracorporeal knot tying techniques. 6 Vicryl sutures were used to closed cuff completely. Care was taken to incorporate distal portion of uterosacral ligaments with the cuff closure to prevent future prolapse. Irrigation was then carried out and pedicles rechecked and found to be hemostatic. The 12mm facial incision was approximated using the endo loop closure device with 0-vicryl suture utilized. At that point gas was emptied , All  the instruments were removed from the abdomen. The counts were correct . The skin incisions were closed with a subcuticular fashion. She was sent to the recovery room in good condition. Instrument, sponge, and needle counts were correct prior to  closure and at the conclusion of the case. Chantelle Morrissey M.D., F.A.C.O. G

## 2020-01-23 NOTE — ANESTHESIA PRE PROCEDURE
Department of Anesthesiology  Preprocedure Note       Name:  Shalom Ruiz   Age:  26 y.o.  :  1990                                          MRN:  [id number]         Date:  2020      Surgeon: Kristie Kingston):  Jamila Long MD    Procedure: [facility] (N/A )    Medications prior to admission:   Prior to Admission medications    Medication Sig Start Date End Date Taking?  Authorizing Provider   propranolol (INDERAL LA) 60 MG extended release capsule Take 1 capsule by mouth daily 20  Yes Abhishek Lewis MD   ranolazine (RANEXA) 500 MG extended release tablet Take 1 tablet by mouth 2 times daily 19  Yes Lino Noel,    nitroGLYCERIN (NITROSTAT) 0.4 MG SL tablet Place 1 tablet under the tongue every 5 minutes as needed for Chest pain 19  Yes Lino Noel,    polyethylene glycol (MIRALAX) powder Use ONE CAPEFUL at 10 am and then 2 pm on day prior to procedure 19  Yes Jamila Long MD   clindamycin (CLEOCIN) 2 % vaginal cream USE one applicatorful vaginally nightly daily for 1 week prior to procedure 19  Yes Jaimla Long MD   famotidine (PEPCID) 20 MG tablet Take 1 tablet by mouth 2 times daily 19  Yes Galindo Prado MD   metoclopramide (REGLAN) 10 MG tablet Take 1 tablet by mouth every 6 hours as needed (N/V) 19  Yes Galindo Prado MD   SUMAtriptan (IMITREX) 6 MG/0.5ML SOLN injection Inject 0.5 mLs into the skin once as needed for Migraine 1/10/20 1/10/20  Abhishek Lewis MD   SUMAtriptan (IMITREX) 100 MG tablet Take 1 tablet by mouth once as needed for Migraine 20  Abhishek Lewis MD   SUMAtriptan (IMITREX) 50 MG tablet Take 1 tablet by mouth once as needed for Migraine 20  Abhishek Lewis MD   acetaminophen (APAP EXTRA STRENGTH) 500 MG tablet Take 2 tablets by mouth every 6 hours as needed for Pain or Fever 19   Camilo Nicole DO       Current medications:    Current Facility-Administered Medications   Medication Dose Route Frequency Provider Date     01/17/2020    K 4.7 01/17/2020     01/17/2020    CO2 18 01/17/2020    BUN 7 01/17/2020    CREATININE 0.69 01/17/2020    GFRAA >60.0 01/17/2020    LABGLOM >60.0 01/17/2020    GLUCOSE 99 01/17/2020    PROT 8.2 12/10/2019    CALCIUM 9.6 01/17/2020    BILITOT <0.2 12/10/2019    ALKPHOS 35 12/10/2019    AST 30 12/10/2019    ALT 26 12/10/2019       POC Tests: No results for input(s): POCGLU, POCNA, POCK, POCCL, POCBUN, POCHEMO, POCHCT in the last 72 hours. Coags:   Lab Results   Component Value Date    PROTIME 13.8 12/10/2019    INR 1.0 12/10/2019    APTT 32.9 12/10/2019       HCG (If Applicable):   Lab Results   Component Value Date    PREGTESTUR negative 11/04/2018        ABGs: No results found for: PHART, PO2ART, NKQ7TNU, JUS5PQJ, BEART, [id number]     Type & Screen (If Applicable):  No results found for: LABABO, LABRH    Anesthesia Evaluation    Airway: Mallampati: II  TM distance: >3 FB   Neck ROM: full  Mouth opening: > = 3 FB Dental:    (+) edentulous      Pulmonary: breath sounds clear to auscultation  (+) current smoker          Patient did not smoke on day of surgery. Cardiovascular:Negative CV ROS          ECG reviewed  Rhythm: regular                      Neuro/Psych:   (+) headaches: migraine headaches,             GI/Hepatic/Renal:   (+) GERD:,           Endo/Other: Negative Endo/Other ROS                    Abdominal:           Vascular: negative vascular ROS. Anesthesia Plan      regional and general     ASA 2     (US guided Erector Spinae Plane block)  Induction: intravenous. MIPS: Prophylactic antiemetics administered. Anesthetic plan and risks discussed with patient. Plan discussed with CRNA.     Attending anesthesiologist reviewed and agrees with Pre Eval content              Mary Mark MD   1/23/2020

## 2020-01-24 ENCOUNTER — TELEPHONE (OUTPATIENT)
Dept: OBGYN CLINIC | Age: 30
End: 2020-01-24

## 2020-01-24 NOTE — TELEPHONE ENCOUNTER
Pt had surgery 1/23/20, she is calling for a prescription for nausea medicine. She said all the medication is making her sick to her stomach.

## 2020-01-29 RX ORDER — ONDANSETRON HYDROCHLORIDE 8 MG/1
8 TABLET, FILM COATED ORAL EVERY 8 HOURS PRN
Qty: 40 TABLET | Refills: 0 | Status: SHIPPED | OUTPATIENT
Start: 2020-01-29 | End: 2020-06-29 | Stop reason: SDUPTHER

## 2020-02-05 ENCOUNTER — OFFICE VISIT (OUTPATIENT)
Dept: OBGYN CLINIC | Age: 30
End: 2020-02-05

## 2020-02-05 VITALS
DIASTOLIC BLOOD PRESSURE: 60 MMHG | HEART RATE: 60 BPM | BODY MASS INDEX: 19.96 KG/M2 | WEIGHT: 99 LBS | SYSTOLIC BLOOD PRESSURE: 94 MMHG | HEIGHT: 59 IN

## 2020-02-05 PROCEDURE — 99024 POSTOP FOLLOW-UP VISIT: CPT | Performed by: OBSTETRICS & GYNECOLOGY

## 2020-02-10 NOTE — PROGRESS NOTES
children: Not on file    Years of education: Not on file    Highest education level: Not on file   Occupational History    Not on file   Social Needs    Financial resource strain: Not on file    Food insecurity:     Worry: Not on file     Inability: Not on file    Transportation needs:     Medical: Not on file     Non-medical: Not on file   Tobacco Use    Smoking status: Light Tobacco Smoker     Packs/day: 0.25     Years: 20.00     Pack years: 5.00     Types: Cigarettes     Start date: 1/19/1997    Smokeless tobacco: Never Used   Substance and Sexual Activity    Alcohol use: No     Alcohol/week: 0.0 standard drinks    Drug use: No    Sexual activity: Yes     Partners: Male   Lifestyle    Physical activity:     Days per week: Not on file     Minutes per session: Not on file    Stress: Not on file   Relationships    Social connections:     Talks on phone: Not on file     Gets together: Not on file     Attends Restoration service: Not on file     Active member of club or organization: Not on file     Attends meetings of clubs or organizations: Not on file     Relationship status: Not on file    Intimate partner violence:     Fear of current or ex partner: Not on file     Emotionally abused: Not on file     Physically abused: Not on file     Forced sexual activity: Not on file   Other Topics Concern    Not on file   Social History Narrative    Not on file       Contraceptive method:  none    Patient's medications, allergies, past medical, surgical,social and family histories were reviewed and updated as appropriate. Review of Systems  Review of Systems   All other systems reviewed and are negative. Review of Systems  Constitutional: Negative for chills and fever. Respiratory: Negative for coughand shortness of breath. Cardiovascular: Negative for chestpain and palpitations. Gastrointestinal: Negative for nauseaand vomiting. Genitourinary: Negative for dysuria, frequencyand urgency.

## 2020-06-29 ENCOUNTER — VIRTUAL VISIT (OUTPATIENT)
Dept: FAMILY MEDICINE CLINIC | Age: 30
End: 2020-06-29
Payer: COMMERCIAL

## 2020-06-29 PROCEDURE — G8427 DOCREV CUR MEDS BY ELIG CLIN: HCPCS | Performed by: FAMILY MEDICINE

## 2020-06-29 PROCEDURE — 99214 OFFICE O/P EST MOD 30 MIN: CPT | Performed by: FAMILY MEDICINE

## 2020-06-29 RX ORDER — RANOLAZINE 500 MG/1
500 TABLET, EXTENDED RELEASE ORAL 2 TIMES DAILY
Qty: 60 TABLET | Refills: 5 | Status: SHIPPED | OUTPATIENT
Start: 2020-06-29 | End: 2020-07-27 | Stop reason: SDUPTHER

## 2020-06-29 RX ORDER — ONDANSETRON HYDROCHLORIDE 8 MG/1
8 TABLET, FILM COATED ORAL EVERY 8 HOURS PRN
Qty: 40 TABLET | Refills: 0 | Status: SHIPPED | OUTPATIENT
Start: 2020-06-29 | End: 2021-05-05 | Stop reason: SDUPTHER

## 2020-06-29 RX ORDER — TOPIRAMATE 50 MG/1
50 TABLET, FILM COATED ORAL DAILY
Qty: 90 TABLET | Refills: 1 | Status: SHIPPED | OUTPATIENT
Start: 2020-06-29 | End: 2020-12-21

## 2020-06-29 RX ORDER — SUMATRIPTAN 6 MG/.5ML
6 INJECTION, SOLUTION SUBCUTANEOUS
Qty: 0.5 ML | Refills: 5 | Status: SHIPPED | OUTPATIENT
Start: 2020-06-29 | End: 2020-09-28

## 2020-06-29 RX ORDER — PROPRANOLOL HCL 60 MG
60 CAPSULE, EXTENDED RELEASE 24HR ORAL DAILY
Qty: 30 CAPSULE | Refills: 5 | Status: SHIPPED | OUTPATIENT
Start: 2020-06-29 | End: 2020-10-22

## 2020-06-29 ASSESSMENT — PATIENT HEALTH QUESTIONNAIRE - PHQ9
SUM OF ALL RESPONSES TO PHQ QUESTIONS 1-9: 0
2. FEELING DOWN, DEPRESSED OR HOPELESS: 0
1. LITTLE INTEREST OR PLEASURE IN DOING THINGS: 0
SUM OF ALL RESPONSES TO PHQ9 QUESTIONS 1 & 2: 0
SUM OF ALL RESPONSES TO PHQ QUESTIONS 1-9: 0

## 2020-06-29 NOTE — PROGRESS NOTES
benefits of converting to a virtual visit were discussed in light of the current infectious disease epidemic. Patient also understood that insurance coverage and co-pays are up to their individual insurance plans and this is a billable visit. Pursuant to the emergency declaration under the [facility], [facility] waiver authority and the [employer] and [employer] Act, this Virtual  Visit was conducted, with patient's consent, to reduce the patient's risk of exposure to COVID-19 and provide continuity of care for an established patient. Services were provided through a video discussion virtually to substitute for in-person clinic visit. The patient was located home and myself, the provider is located home. Patient opal Nieto (:  [birth year] ) has requested an audio/video evaluation for the following concern(s):     HPI:      Since I last saw her she had a spontaneous ab and a hysterectomy. She is havig more severe migraines and is out of meds. She is eating OK, but is tired and has a history of anemia. Has not been able to get into see a specialist about her migraines. Review of Systems    Constitutional: Npos for fatigue . No fever and sweats. HEENT: Negative for eye discharge and vision loss. Negative for ear drainage, hearing loss and nasal drainage. Respiratory: Negative for cough, dyspnea and wheezing. Cardiovascular:  Negative for chest pain, claudication and irregular heartbeat/palpitations. Gastrointestinal: Negative for abdominal pain, nausea, constipation and diarrhea. Genitourinary: Negative for dysuria,   Metabolic/Endocrine: Negative for cold intolerance, heat intolerance, polydipsia and polyphagia. No unintended weight loss or weight gain. Neuro/Psychiatric: Negative for gait disturbance. Negative for psychiatric symptoms. Dermatologic: Negative for pruritus and rash.   Musculoskeletal: Negative for bone/joint

## 2020-07-27 ENCOUNTER — TELEPHONE (OUTPATIENT)
Dept: FAMILY MEDICINE CLINIC | Age: 30
End: 2020-07-27

## 2020-07-27 DIAGNOSIS — G43.019 INTRACTABLE MIGRAINE WITHOUT AURA AND WITHOUT STATUS MIGRAINOSUS: ICD-10-CM

## 2020-07-27 DIAGNOSIS — G93.40 ENCEPHALOPATHY: ICD-10-CM

## 2020-07-27 DIAGNOSIS — R53.83 FATIGUE, UNSPECIFIED TYPE: ICD-10-CM

## 2020-07-27 DIAGNOSIS — R07.2 PRECORDIAL PAIN: ICD-10-CM

## 2020-07-27 LAB
ALBUMIN SERPL-MCNC: 4.6 G/DL (ref 3.5–4.6)
ALP BLD-CCNC: 44 U/L (ref 40–130)
ALT SERPL-CCNC: 9 U/L (ref 0–33)
ANION GAP SERPL CALCULATED.3IONS-SCNC: 13 MEQ/L (ref 9–15)
AST SERPL-CCNC: 15 U/L (ref 0–35)
BASOPHILS ABSOLUTE: 0 K/UL (ref 0–0.2)
BASOPHILS RELATIVE PERCENT: 0.5 %
BILIRUB SERPL-MCNC: 0.3 MG/DL (ref 0.2–0.7)
BUN BLDV-MCNC: 9 MG/DL (ref 6–20)
CALCIUM SERPL-MCNC: 9.3 MG/DL (ref 8.5–9.9)
CHLORIDE BLD-SCNC: 100 MEQ/L (ref 95–107)
CO2: 23 MEQ/L (ref 20–31)
CREAT SERPL-MCNC: 0.72 MG/DL (ref 0.5–0.9)
EOSINOPHILS ABSOLUTE: 0.1 K/UL (ref 0–0.7)
EOSINOPHILS RELATIVE PERCENT: 1.6 %
GFR AFRICAN AMERICAN: >60
GFR NON-AFRICAN AMERICAN: >60
GLOBULIN: 2.7 G/DL (ref 2.3–3.5)
GLUCOSE BLD-MCNC: 116 MG/DL (ref 70–99)
HCT VFR BLD CALC: 40.1 % (ref 37–47)
HEMOGLOBIN: 13.8 G/DL (ref 12–16)
LYMPHOCYTES ABSOLUTE: 3.2 K/UL (ref 1–4.8)
LYMPHOCYTES RELATIVE PERCENT: 41.2 %
MCH RBC QN AUTO: 33.6 PG (ref 27–31.3)
MCHC RBC AUTO-ENTMCNC: 34.5 % (ref 33–37)
MCV RBC AUTO: 97.6 FL (ref 82–100)
MONOCYTES ABSOLUTE: 0.4 K/UL (ref 0.2–0.8)
MONOCYTES RELATIVE PERCENT: 5.2 %
NEUTROPHILS ABSOLUTE: 4 K/UL (ref 1.4–6.5)
NEUTROPHILS RELATIVE PERCENT: 51.5 %
PDW BLD-RTO: 12.7 % (ref 11.5–14.5)
PLATELET # BLD: 209 K/UL (ref 130–400)
POTASSIUM SERPL-SCNC: 4.4 MEQ/L (ref 3.4–4.9)
RBC # BLD: 4.11 M/UL (ref 4.2–5.4)
SEDIMENTATION RATE, ERYTHROCYTE: 5 MM (ref 0–20)
SODIUM BLD-SCNC: 136 MEQ/L (ref 135–144)
TOTAL PROTEIN: 7.3 G/DL (ref 6.3–8)
TSH REFLEX: 1.24 UIU/ML (ref 0.44–3.86)
WBC # BLD: 7.8 K/UL (ref 4.8–10.8)

## 2020-07-27 RX ORDER — RANOLAZINE 500 MG/1
500 TABLET, EXTENDED RELEASE ORAL 2 TIMES DAILY
Qty: 60 TABLET | Refills: 5 | Status: SHIPPED | OUTPATIENT
Start: 2020-07-27 | End: 2021-01-11

## 2020-07-29 NOTE — TELEPHONE ENCOUNTER
Pt stopped in regarding her Ranexa. She's stating that the pharmacy has been trying to contact Dr. Yun Verde stating that a pre-authorization is required. I don't see any indication that the pharmacy has asked for a PA on this patient/medication.

## 2020-07-30 ENCOUNTER — VIRTUAL VISIT (OUTPATIENT)
Dept: FAMILY MEDICINE CLINIC | Age: 30
End: 2020-07-30
Payer: COMMERCIAL

## 2020-07-30 ENCOUNTER — TELEPHONE (OUTPATIENT)
Dept: FAMILY MEDICINE CLINIC | Age: 30
End: 2020-07-30

## 2020-07-30 PROCEDURE — 99442 PR PHYS/QHP TELEPHONE EVALUATION 11-20 MIN: CPT | Performed by: FAMILY MEDICINE

## 2020-07-30 NOTE — TELEPHONE ENCOUNTER
Patient is calling in stating that she needs an alternative to the [facility]exa. [facility] .. they told her to call and get an alternative prescribed. Please advise.  TY

## 2020-07-30 NOTE — PROGRESS NOTES
Patient: Javier Garcia    YOB: 1990    Date: 20    Chief Complaint   Patient presents with    Medication Problem       Patient Active Problem List    Diagnosis Date Noted    Encephalopathy     Abnormal uterine bleeding (AUB)     Precordial pain 2019    , missed     Postpartum depression 2018    Patient underweight 2018    Anxiety 2018    Abnormal glucose tolerance in mother complicating pregnancy     Pelvic pain affecting pregnancy in third trimester, antepartum     Supervision of normal pregnancy 2017    Intractable migraine without aura and without status migrainosus 2017    Heart palpitations 2017    Chronic diarrhea of unknown origin 2017    Back pain affecting pregnancy in second trimester 2017    Previous  delivery affecting pregnancy 2017     (vaginal birth after )     Leakage of amniotic fluid      contractions     34 weeks gestation of pregnancy     Anemia affecting pregnancy        Allergies   Allergen Reactions    Acyclovir And Related Swelling    Morphine Itching    Pcn [Penicillins] Anaphylaxis and Hives    Amoxicillin Hives and Swelling     Throat swelling    Cephalexin Hives    Ibuprofen Hives    Macrobid [Nitrofurantoin Macrocrystal] Hives    Hydrocodone-Acetaminophen Itching    Oxycodone-Acetaminophen Itching           There were no vitals filed for this visit. There is no height or weight on file to calculate BMI. HPI    TELEHEALTH EVALUATION -- Audio/Visual (During [id number]-[id number] public health emergency        Due to COVID 19 outbreak, patient's office visit was converted to a virtual visit. The patient was identified at the start of the visit. Patient was contacted and agreed to proceed with a virtual visit via Telephone Visit Time spent in visit with management in direct patient care 12 minutes.   The risks and benefits of converting to a virtual visit were discussed in light of the current infectious disease epidemic. Patient also understood that insurance coverage and co-pays are up to their individual insurance plans and this is a billable visit. Pursuant to the emergency declaration under the [facility], [facility] waiver authority and the [employer] and [employer] Act, this Virtual  Visit was conducted, with patient's consent, to reduce the patient's risk of exposure to COVID-19 and provide continuity of care for an established patient. Services were provided through a phone discussion virtually to substitute for in-person clinic visit. The patient was located home and myself, the provider is located office. Patient opal Souza (:  [birth year] ) has requested an audio/video evaluation for the following concern(s):     HPI:    She was having trouble getting the pharmacy to fill her Ranexa prescription. We had renewed that for as a paper to cardiology as it was helping her. However I do not have documentation to do a prior off of this medicine for her. Have asked her to call cardiology and straighten this out and I will put the referral in case she is due to follow-up with them in person. She is also follow-up filed for Social Security disability and I have not see that paperwork but will keep an eye out for it I reviewed the office procedure for this including copy of all the records for their determination. She is feeling well otherwise. Review of Systems    Constitutional: Negative for fatigue, fever and sweats. HEENT: Negative for eye discharge and vision loss. Negative for ear drainage, hearing loss and nasal drainage. Respiratory: Negative for cough, dyspnea and wheezing. Cardiovascular:  Negative for chest pain, claudication and irregular heartbeat/palpitations.   Gastrointestinal: Negative for abdominal pain, nausea, constipation and diarrhea. Genitourinary: Negative for dysuria, hematuria, polyuria, dysmenorrhea, menorrhagia and vaginal discharge. Metabolic/Endocrine: Negative for cold intolerance, heat intolerance, polydipsia and polyphagia. No unintended weight loss or weight gain. Neuro/Psychiatric: Negative for gait disturbance. Negative for psychiatric symptoms. Dermatologic: Negative for pruritus and rash. Musculoskeletal: Negative for bone/joint symptoms. No numbness or tingling. No loss of function. Hematology: Negative for bleeding and easy bruising. Immunology:  Negative for environmental allergies and food allergies. Physical Exam    Not able to be done as this was a phone visit. Patient was alert, oriented, appropriate, not SOB with talking and not in distress. Assessment:   Diagnosis Orders   1.  Other chest pain  [address] [facility], [place]              Plan:  Current Outpatient Medications   Medication Sig Dispense Refill    ranolazine (RANEXA) 500 MG extended release tablet Take 1 tablet by mouth 2 times daily 60 tablet 5    propranolol (INDERAL LA) 60 MG extended release capsule Take 1 capsule by mouth daily 30 capsule 5    SUMAtriptan (IMITREX) 6 MG/0.5ML SOLN injection Inject 0.5 mLs into the skin once as needed for Migraine 0.5 mL 5    topiramate (TOPAMAX) 50 MG tablet Take 1 tablet by mouth daily 90 tablet 1    ondansetron (ZOFRAN) 8 MG tablet Take 1 tablet by mouth every 8 hours as needed for Nausea or Vomiting 40 tablet 0    simethicone (MYLICON) 80 MG chewable tablet Take 1 tablet by mouth 4 times daily as needed for Flatulence 180 tablet 1    nitroGLYCERIN (NITROSTAT) 0.4 MG SL tablet Place 1 tablet under the tongue every 5 minutes as needed for Chest pain 25 tablet 3    famotidine (PEPCID) 20 MG tablet Take 1 tablet by mouth 2 times daily 60 tablet 5    metoclopramide (REGLAN) 10 MG tablet Take 1 tablet by mouth every 6 hours as needed (N/V) 30 tablet 0     No current facility-administered medications for this visit. Orders Placed This Encounter   Procedures   [address], [place], [address], Invasive Cardiology, [place]     Referral Priority:   Routine     Referral Type:   Eval and Treat     Referral Reason:   Specialty Services Required     Referred to Provider:   Macy Vincent DO     Requested Specialty:   Cardiology     Number of Visits Requested:   1       No orders of the defined types were placed in this encounter. Return if symptoms worsen or fail to improve.     Dr. Grant Bateman      7/30/20  3:38 PM

## 2020-08-03 ENCOUNTER — TELEPHONE (OUTPATIENT)
Dept: FAMILY MEDICINE CLINIC | Age: 30
End: 2020-08-03

## 2020-08-13 ENCOUNTER — OFFICE VISIT (OUTPATIENT)
Dept: NEUROLOGY | Age: 30
End: 2020-08-13
Payer: COMMERCIAL

## 2020-08-13 VITALS
BODY MASS INDEX: 20 KG/M2 | WEIGHT: 99 LBS | DIASTOLIC BLOOD PRESSURE: 92 MMHG | HEART RATE: 65 BPM | SYSTOLIC BLOOD PRESSURE: 132 MMHG

## 2020-08-13 PROCEDURE — 4004F PT TOBACCO SCREEN RCVD TLK: CPT | Performed by: NURSE PRACTITIONER

## 2020-08-13 PROCEDURE — 99205 OFFICE O/P NEW HI 60 MIN: CPT | Performed by: NURSE PRACTITIONER

## 2020-08-13 PROCEDURE — G8427 DOCREV CUR MEDS BY ELIG CLIN: HCPCS | Performed by: NURSE PRACTITIONER

## 2020-08-13 PROCEDURE — G8420 CALC BMI NORM PARAMETERS: HCPCS | Performed by: NURSE PRACTITIONER

## 2020-08-13 RX ORDER — UBROGEPANT 50 MG/1
50 TABLET ORAL DAILY PRN
Qty: 10 TABLET | Refills: 3 | Status: SHIPPED | OUTPATIENT
Start: 2020-08-13 | End: 2020-09-28

## 2020-08-13 RX ORDER — PEN NEEDLE, DIABETIC 29 G X1/2"
NEEDLE, DISPOSABLE MISCELLANEOUS
COMMUNITY
Start: 2020-07-27

## 2020-08-13 RX ORDER — TIZANIDINE 4 MG/1
4 TABLET ORAL NIGHTLY
Qty: 30 TABLET | Refills: 0 | Status: SHIPPED | OUTPATIENT
Start: 2020-08-13 | End: 2021-02-24 | Stop reason: ALTCHOICE

## 2020-08-13 ASSESSMENT — ENCOUNTER SYMPTOMS
BACK PAIN: 0
COLOR CHANGE: 0
ABDOMINAL DISTENTION: 0
DIARRHEA: 0
TROUBLE SWALLOWING: 0
VOMITING: 1
NAUSEA: 1
CHEST TIGHTNESS: 0
CONSTIPATION: 0
WHEEZING: 0
ABDOMINAL PAIN: 0
PHOTOPHOBIA: 0
COUGH: 0
SHORTNESS OF BREATH: 0

## 2020-08-13 NOTE — PROGRESS NOTES
Subjective:      Patient ID: Jordan Dia is a 31 y.o. female who presents today for:  Chief Complaint   Patient presents with    Follow-up     consistant migraines pressure behind left eye, and loses control of mobility pt muscle spasms and migel horses she fell the other day when she had a muscle spasm and migel horse at the same time       HPI  Pt seen and examined in the office for neurology consult. Patient is a 65-year-old  female with past medical history of asthma, chest pain, postpartum depression, nephrolithiasis, intractable migraine, encephalopathy, anemia who presents as referred by primary care for migraine headaches. She reports that she has had migraine headaches since childhood at the approximate age of 8. She reports that she is getting daily headaches and that sometimes her migraine headaches will last for a week. She describes them as pain and pressure behind her eye but also states that she gets sharp shooting pains all over her head and pain at the base of her neck and down her spine. Patient reports that she rates pain on a 10. She denies any visual aura. She does have associated nausea and vomiting, dizziness, phonophobia. She denies photophobia. Patient denies history of significant head or neck trauma. Denies history of CVA or seizure. Patient denies alcohol or drug use. She does smoke approximately 1/4 pack/day. She denies any significant stress, anxiety, depression. Patient does have issues with insomnia and is taking melatonin which is helping her to sleep at night. She denies snoring or history of sleep apnea. Patient is tried multiple medications in the past including over-the-counter NSAIDs and acetaminophen, amitriptyline, Topamax and propranolol as well as Imitrex. Patient also reports a plethora of other symptoms. She states that she has been getting muscle cramps all over her body but mainly in her legs.   She states that sometimes the cramping will be so bad it will make her fall to the ground. Patient also reports that she has altered symptoms of taste, pressure in her head, and sensation of fluid flowing down her spine. She reports that she has weakness of extremities at times and that she will drop things often. She reports frequent lightheadedness with standing. Patient reports difficulty with ambulation at times and feeling off balance. She denies any significant weight loss that was unplanned, rash, joint pain or swelling. No reports of diplopia, dysphagia, dysarthria, paresthesias or one-sided weakness. Patient does have history of chest pain for which she has seen cardiology and had a work-up done in the past.  She is currently on Ranexa and propranolol. Patient reports that often times she will be severely fatigued and will not have energy to go about her day.   Past Medical History:   Diagnosis Date    Anemia     Encephalopathy     Intractable migraine without aura and without status migrainosus 2017    Kidney stones     Postpartum depression 2018    Precordial pain 2019    Unspecified asthma(493.90)      Past Surgical History:   Procedure Laterality Date     SECTION      DILATION AND CURETTAGE OF UTERUS N/A 2019    SUCTION DILATATION AND CURETTAGE performed by Lance Hamilton MD at [address] [place] [address], VAGINAL N/A 2020    [facility], BILATERAL SALPINGECTOMY performed by Lance Hamilton MD at [place] [address]      laparascopic sx    TONSILLECTOMY       Social History     Socioeconomic History    Marital status:      Spouse name: Not on file    Number of children: Not on file    Years of education: Not on file    Highest education level: Not on file   Occupational History    Not on file   Social Needs    Financial resource strain: Not on file    Food insecurity     Worry: Not on file     Inability: Not on file    Transportation needs     Medical: Not on file Non-medical: Not on file   Tobacco Use    Smoking status: Light Tobacco Smoker     Packs/day: 0.25     Years: 20.00     Pack years: 5.00     Types: Cigarettes     Start date: 1/19/1997    Smokeless tobacco: Never Used   Substance and Sexual Activity    Alcohol use: No     Alcohol/week: 0.0 standard drinks    Drug use: No    Sexual activity: Yes     Partners: Male   Lifestyle    Physical activity     Days per week: Not on file     Minutes per session: Not on file    Stress: Not on file   Relationships    Social connections     Talks on phone: Not on file     Gets together: Not on file     Attends Christian service: Not on file     Active member of club or organization: Not on file     Attends meetings of clubs or organizations: Not on file     Relationship status: Not on file    Intimate partner violence     Fear of current or ex partner: Not on file     Emotionally abused: Not on file     Physically abused: Not on file     Forced sexual activity: Not on file   Other Topics Concern    Not on file   Social History Narrative    Not on file     Family History   Problem Relation Age of Onset    Asthma Mother     High Blood Pressure Mother     Asthma Father     Heart Disease Maternal Grandmother     Asthma Maternal Grandmother     Diabetes Maternal Grandmother     Kidney Disease Maternal Grandmother     Other Maternal Grandmother     Cancer Paternal Grandfather      Allergies   Allergen Reactions    Acyclovir And Related Swelling    Morphine Itching    Pcn [Penicillins] Anaphylaxis and Hives    Amoxicillin Hives and Swelling     Throat swelling    Cephalexin Hives    Ibuprofen Hives    Macrobid [Nitrofurantoin Macrocrystal] Hives    Hydrocodone-Acetaminophen Itching    Oxycodone-Acetaminophen Itching     Current Outpatient Medications on File Prior to Visit   Medication Sig Dispense Refill    ranolazine (RANEXA) 500 MG extended release tablet Take 1 tablet by mouth 2 times daily 60 tablet 5  propranolol (INDERAL LA) 60 MG extended release capsule Take 1 capsule by mouth daily 30 capsule 5    SUMAtriptan (IMITREX) 6 MG/0.5ML SOLN injection Inject 0.5 mLs into the skin once as needed for Migraine 0.5 mL 5    topiramate (TOPAMAX) 50 MG tablet Take 1 tablet by mouth daily 90 tablet 1    ondansetron (ZOFRAN) 8 MG tablet Take 1 tablet by mouth every 8 hours as needed for Nausea or Vomiting 40 tablet 0    simethicone (MYLICON) 80 MG chewable tablet Take 1 tablet by mouth 4 times daily as needed for Flatulence 180 tablet 1    nitroGLYCERIN (NITROSTAT) 0.4 MG SL tablet Place 1 tablet under the tongue every 5 minutes as needed for Chest pain 25 tablet 3    famotidine (PEPCID) 20 MG tablet Take 1 tablet by mouth 2 times daily 60 tablet 5    metoclopramide (REGLAN) 10 MG tablet Take 1 tablet by mouth every 6 hours as needed (N/V) 30 tablet 0    BD INSULIN SYRINGE U/F 31G X 5/16\" 0.5 ML MISC USE AS DIRECTED WITH SUMATRIPTAN       No current facility-administered medications on file prior to visit. Review of Systems   Constitutional: Positive for fatigue. Negative for appetite change, chills and fever. HENT: Positive for ear pain. Negative for hearing loss and trouble swallowing. Eyes: Negative for photophobia and visual disturbance. Respiratory: Negative for cough, chest tightness, shortness of breath and wheezing. Cardiovascular: Positive for chest pain. Negative for palpitations and leg swelling. Gastrointestinal: Positive for nausea and vomiting. Negative for abdominal distention, abdominal pain, constipation and diarrhea. Genitourinary: Negative for difficulty urinating. Musculoskeletal: Positive for gait problem, myalgias and neck pain. Negative for arthralgias, back pain, joint swelling and neck stiffness. Skin: Negative for color change and rash. Neurological: Positive for weakness, light-headedness and headaches.  Negative for dizziness, tremors, seizures, syncope, facial asymmetry, speech difficulty and numbness. Psychiatric/Behavioral: Positive for sleep disturbance. Negative for agitation, confusion, decreased concentration, hallucinations and suicidal ideas. The patient is not nervous/anxious. Objective:   BP (!) 132/92   Pulse 65   Wt 99 lb (44.9 kg)   LMP 01/03/2020   BMI 20.00 kg/m²     Physical Exam  Vitals signs reviewed. Constitutional:       General: She is not in acute distress. Appearance: She is not ill-appearing or diaphoretic. HENT:      Head: Normocephalic and atraumatic. Eyes:      General: No visual field deficit. Extraocular Movements: Extraocular movements intact. Pupils: Pupils are equal, round, and reactive to light. Neck:      Musculoskeletal: No neck rigidity or muscular tenderness. Cardiovascular:      Rate and Rhythm: Normal rate and regular rhythm. Pulmonary:      Effort: Pulmonary effort is normal. No respiratory distress. Breath sounds: Normal breath sounds. Abdominal:      General: Bowel sounds are normal. There is no distension. Palpations: Abdomen is soft. Tenderness: There is no abdominal tenderness. Lymphadenopathy:      Cervical: No cervical adenopathy. Skin:     General: Skin is warm and dry. Neurological:      General: No focal deficit present. Mental Status: She is alert and oriented to person, place, and time. Cranial Nerves: No cranial nerve deficit, dysarthria or facial asymmetry. Sensory: No sensory deficit. Motor: No weakness, tremor, atrophy, abnormal muscle tone, seizure activity or pronator drift. Coordination: Romberg sign negative. Coordination normal. Finger-Nose-Finger Test and Heel to [facility] Test normal.      Gait: Gait normal.      Deep Tendon Reflexes: Reflexes normal.      Reflex Scores:       Brachioradialis reflexes are 2+ on the right side and 2+ on the left side.        Patellar reflexes are 2+ on the right side and 2+ on the left side.        No results found. Lab Results   Component Value Date    WBC 7.8 07/27/2020    RBC 4.11 07/27/2020    HGB 13.8 07/27/2020    HCT 40.1 07/27/2020    MCV 97.6 07/27/2020    MCH 33.6 07/27/2020    MCHC 34.5 07/27/2020    RDW 12.7 07/27/2020     07/27/2020    MPV 10.2 10/07/2014     Lab Results   Component Value Date     07/27/2020    K 4.4 07/27/2020     07/27/2020    CO2 23 07/27/2020    BUN 9 07/27/2020    CREATININE 0.72 07/27/2020    GFRAA >60.0 07/27/2020    LABGLOM >60.0 07/27/2020    GLUCOSE 116 07/27/2020    PROT 7.3 07/27/2020    LABALBU 4.6 07/27/2020    CALCIUM 9.3 07/27/2020    BILITOT 0.3 07/27/2020    ALKPHOS 44 07/27/2020    AST 15 07/27/2020    ALT 9 07/27/2020     Lab Results   Component Value Date    PROTIME 13.8 12/10/2019    INR 1.0 12/10/2019     Lab Results   Component Value Date    TSH 1.610 08/07/2018     Lab Results   Component Value Date    TRIG 151 08/07/2018    HDL 61 08/07/2018    LDLCALC 28 08/07/2018     Lab Results   Component Value Date    LABAMPH Neg 11/22/2019    BARBSCNU Neg 11/22/2019    LABBENZ Neg 11/22/2019    LABMETH Neg 11/22/2019    OPIATESCREENURINE Neg 11/22/2019    PHENCYCLIDINESCREENURINE Neg 11/22/2019     No results found for: LITHIUM, DILFRTOT, VALPROATE    Assessment and Plan:      1. Other migraine without status migrainosus, intractable  -Patient being seen for migraine headache that began in childhood at the approximate age of 8. Patient reports continual daily migraine headaches that will sometimes last up to 2 weeks. She denies associated visual aura. She does have associated nausea and vomiting, dizziness, phonophobia. Patient is tried multiple medications in the past including over-the-counter acetaminophen and NSAIDs, amitriptyline, Topamax, propranolol, sumatriptan. Patient reports little relief with preventative meds. She does get some relief from the sumatriptan.   We had long discussion regarding options for preventative treatment. Recommended CGRP blocker. Patient declined this as she is fearful of allergies. Patient has noted long list of allergies to medications. Advised patient that with her underlying cardiac condition with chest pain we advised her to get the okay from cardiology before continuing any further with the use of Imitrex. We will send in prescription for Ubrelvy for rescue medication. Patient agreeable to try this. Patient with a plethora of constitutional symptoms including fatigue, lightheadedness, difficulty with ambulation, muscle cramps, weakness of extremities. We will obtain MRI to rule out any intracranial pathology. Some laboratory testing has already been done by primary care including CBC, CMP, sed rate, TSH on 7/27/2020 with findings all essentially within normal limits. Patient's neurological examination is normal today. Will further assess CK and magnesium levels due to patient's reports of muscle cramping.  - Ubrogepant (UBRELVY) 50 MG TABS; Take 50 mg by mouth daily as needed (migraine) May take 2nd dose in 2 hrs if headache persists  Dispense: 10 tablet; Refill: 3  - MRI BRAIN WO CONTRAST; Future    2. Tension headache  - tiZANidine (ZANAFLEX) 4 MG tablet; Take 1 tablet by mouth nightly  Dispense: 30 tablet; Refill: 0    3. Muscle cramps  - CK; Future  - Magnesium; Future  - tiZANidine (ZANAFLEX) 4 MG tablet; Take 1 tablet by mouth nightly  Dispense: 30 tablet; Refill: 0    4. Ataxic gait  - MRI BRAIN WO CONTRAST; Future  - Anti-Dna Antibody, Double-Stranded; Future  - Lupus Anticoagulant; Future  - ETHAN; Future    5. Cervicalgia  - Xray cervical spine    6. Chest pain, unspecified type  -Patient being followed by cardiology. She is currently on Ranexa and propranolol. Advised her that she will need to get the okay from cardiology before continuing any further with Imitrex.     7. Encounter to establish care  -We will await above work-up and further advise    Return in about

## 2020-08-26 ENCOUNTER — TELEPHONE (OUTPATIENT)
Dept: NEUROLOGY | Age: 30
End: 2020-08-26

## 2020-08-27 NOTE — TELEPHONE ENCOUNTER
Patient advised , imaging will be done Friday and labs soon.  Advised patient to call once everything was completed

## 2020-08-28 ENCOUNTER — CLINICAL DOCUMENTATION (OUTPATIENT)
Dept: MRI IMAGING | Age: 30
End: 2020-08-28

## 2020-08-28 ENCOUNTER — TELEPHONE (OUTPATIENT)
Dept: NEUROLOGY | Age: 30
End: 2020-08-28

## 2020-09-08 DIAGNOSIS — R25.2 MUSCLE CRAMPS: ICD-10-CM

## 2020-09-08 DIAGNOSIS — R26.0 ATAXIC GAIT: ICD-10-CM

## 2020-09-08 LAB
MAGNESIUM: 2.1 MG/DL (ref 1.7–2.4)
TOTAL CK: 78 U/L (ref 0–170)

## 2020-09-09 ENCOUNTER — TELEPHONE (OUTPATIENT)
Dept: NEUROLOGY | Age: 30
End: 2020-09-09

## 2020-09-09 NOTE — TELEPHONE ENCOUNTER
Denial received for Ubrelvy. Patient has tried Imitrex oral and injections in the past without good effect and adverse side effect of nausea. Patient is also tried multiple preventative meds including amitriptyline, Topamax, propranolol. She tried sample Ubrelvy and reports that this worked well for her.   Will proceed with appeal.

## 2020-09-10 LAB
ANA PATTERN: ABNORMAL
ANA TITER: ABNORMAL
ANTINUCLEAR AB INTERPRETIVE COMMENT: ABNORMAL
ANTINUCLEAR ANTIBODY, HEP-2, IGG: DETECTED
DOUBLE STRANDED DNA AB, IGG: NORMAL
DRVVT CONFIRMATION TEST: NORMAL RATIO
DRVVT SCREEN: 37 SEC (ref 33–44)
DRVVT,DIL: NORMAL SEC (ref 33–44)
HEXAGONAL PHOSPHOLIPID NEUTRALIZAT TEST: NORMAL
LUPUS ANTICOAG INTERP: NORMAL
PLT NEUTA: NORMAL
PT D: 12.4 SEC (ref 12–15.5)
PTT D: 41 SEC (ref 32–48)
PTT-D CORR REFLEX: NORMAL SEC (ref 32–48)
PTT-HEPARIN NEUTRALIZED: NORMAL SEC (ref 32–48)
REPTILASE TIME: NORMAL SEC
THROMBIN TIME: NORMAL SEC (ref 14.7–19.5)

## 2020-09-15 ENCOUNTER — HOSPITAL ENCOUNTER (OUTPATIENT)
Dept: GENERAL RADIOLOGY | Age: 30
Discharge: HOME | End: 2020-09-17
Payer: COMMERCIAL

## 2020-09-15 ENCOUNTER — HOSPITAL ENCOUNTER (OUTPATIENT)
Dept: MRI IMAGING | Age: 30
Discharge: HOME | End: 2020-09-17
Payer: COMMERCIAL

## 2020-09-15 ENCOUNTER — TELEPHONE (OUTPATIENT)
Dept: NEUROLOGY | Age: 30
End: 2020-09-15

## 2020-09-15 PROCEDURE — 70551 MRI BRAIN STEM W/O DYE: CPT

## 2020-09-15 PROCEDURE — 72050 X-RAY EXAM NECK SPINE 4/5VWS: CPT

## 2020-09-15 NOTE — TELEPHONE ENCOUNTER
Received results of antinuclear antibody testing which was positive. Given patient's plethora of symptoms we will refer to rheumatology. Patient was called and advised that this is a very nonspecific test and that we would like further input from rheumatology. She is to call her insurance and find out what rheumatologist is covered and schedule an appointment. She was instructed to call if she needs further assistance with this.

## 2020-09-16 ENCOUNTER — TELEPHONE (OUTPATIENT)
Dept: NEUROLOGY | Age: 30
End: 2020-09-16

## 2020-09-16 NOTE — TELEPHONE ENCOUNTER
Please call and advise patient that we had recommended CGRP for her. she declined this at her visit. Please find out if she is willing to try it at this time and that she is failed multiple medications.

## 2020-09-17 RX ORDER — UBROGEPANT 50 MG/1
50 TABLET ORAL PRN
Qty: 2 TABLET | Refills: 0 | COMMUNITY
Start: 2020-09-17 | End: 2020-09-28

## 2020-09-17 NOTE — TELEPHONE ENCOUNTER
Call patient to go over results of MRI. No answer. Message left for her to call the office to discuss further.

## 2020-09-17 NOTE — TELEPHONE ENCOUNTER
Patient called back and I discussed results of MRI with her. She did see rheumatology yesterday and had further testing done. We discussed treatment for headaches and patient wishes to await results of appeal for Ubrelvy before pursuing any other medications.

## 2020-09-28 ENCOUNTER — VIRTUAL VISIT (OUTPATIENT)
Dept: FAMILY MEDICINE CLINIC | Age: 30
End: 2020-09-28
Payer: COMMERCIAL

## 2020-09-28 PROCEDURE — G8428 CUR MEDS NOT DOCUMENT: HCPCS | Performed by: FAMILY MEDICINE

## 2020-09-28 PROCEDURE — 99214 OFFICE O/P EST MOD 30 MIN: CPT | Performed by: FAMILY MEDICINE

## 2020-09-28 RX ORDER — PREDNISONE 10 MG/1
TABLET ORAL
Qty: 30 TABLET | Refills: 0 | Status: SHIPPED | OUTPATIENT
Start: 2020-09-28 | End: 2021-02-24 | Stop reason: ALTCHOICE

## 2020-09-28 RX ORDER — OMEPRAZOLE 20 MG/1
20 CAPSULE, DELAYED RELEASE ORAL
Qty: 90 CAPSULE | Refills: 1 | Status: SHIPPED | OUTPATIENT
Start: 2020-09-28 | End: 2021-05-05 | Stop reason: SDUPTHER

## 2020-09-28 NOTE — PROGRESS NOTES
Patient: Sherice Corral    YOB: 1990    Date: 20    Chief Complaint   Patient presents with    Back Pain       Patient Active Problem List    Diagnosis Date Noted    Encephalopathy     Abnormal uterine bleeding (AUB)     Precordial pain 2019    , missed     Postpartum depression 2018    Patient underweight 2018    Anxiety 2018    Abnormal glucose tolerance in mother complicating pregnancy 8429    Pelvic pain affecting pregnancy in third trimester, antepartum     Supervision of normal pregnancy 2017    Intractable migraine without aura and without status migrainosus 2017    Heart palpitations 2017    Chronic diarrhea of unknown origin 2017    Back pain affecting pregnancy in second trimester 2017    Previous  delivery affecting pregnancy 2017     (vaginal birth after )     Leakage of amniotic fluid      contractions     34 weeks gestation of pregnancy     Anemia affecting pregnancy        Allergies   Allergen Reactions    Acyclovir And Related Swelling    Morphine Itching    Pcn [Penicillins] Anaphylaxis and Hives    Amoxicillin Hives and Swelling     Throat swelling    Cephalexin Hives    Ibuprofen Hives    Macrobid [Nitrofurantoin Macrocrystal] Hives    Hydrocodone-Acetaminophen Itching    Oxycodone-Acetaminophen Itching           There were no vitals filed for this visit. There is no height or weight on file to calculate BMI. HPI    TELEHEALTH EVALUATION -- Audio/Visual (During [id number]-97 public health emergency        Due to COVID 19 outbreak, patient's office visit was converted to a virtual visit. The patient was identified at the start of the visit. Patient was contacted and agreed to proceed with a virtual visit via Doxy. me Time spent in visit with management in direct patient care 25 minutes.   The risks and benefits of converting to a virtual visit were discussed in light of the current infectious disease epidemic. Patient also understood that insurance coverage and co-pays are up to their individual insurance plans and this is a billable visit. Pursuant to the emergency declaration under the [facility] [place] [address] [facility] authority and the [employer] Act, this Virtual  Visit was conducted, with patient's consent, to reduce the patient's risk of exposure to COVID-19 and provide continuity of care for an established patient. Services were provided through a video discussion virtually to substitute for in-person clinic visit. The patient was located home and myself, the provider is located home. Patient opal Currie (:  [birth year] ) has requested an audio/video evaluation for the following concern(s):     HPI:  Over the last week she has had increasing mid back pain occasionally radiating down her legs. No fall or injury. She has been seeing her neurologist who stopped her sumatriptan and gave her Zanaflex for muscle spasm. That is not helping. Flexing forward hurts her a lot. No change in bowel or bladder control. Discussed recent XR. Needs med refilled. Review of Systems    Constitutional: Negative for fatigue, fever and sweats. HEENT: Negative for eye discharge and vision loss. Negative for ear drainage, hearing loss and nasal drainage. Respiratory: Negative for cough, dyspnea and wheezing. Cardiovascular:  Negative for chest pain, claudication and irregular heartbeat/palpitations. Gastrointestinal: Negative for abdominal pain, nausea, constipation and diarrhea. Genitourinary: Negative for dysuria, hematuria, polyuria, dysmenorrhea, menorrhagia and vaginal discharge. Metabolic/Endocrine: Negative for cold intolerance, heat intolerance, polydipsia and polyphagia. No unintended weight loss or weight gain. Neuro/Psychiatric: Negative for gait disturbance. metoclopramide (REGLAN) 10 MG tablet Take 1 tablet by mouth every 6 hours as needed (N/V) 30 tablet 0     No current facility-administered medications for this visit. Orders Placed This Encounter   Procedures    XR LUMBAR SPINE (MIN 4 VIEWS)     Standing Status:   Future     Standing Expiration Date:   2021       Orders Placed This Encounter   Medications    predniSONE (DELTASONE) 10 MG tablet     Si PO each day for three days, 3 PO each day for three days, 2 PO each day for three days ,1 PO each day until gone     Dispense:  30 tablet     Refill:  0    omeprazole (PRILOSEC) 20 MG delayed release capsule     Sig: Take 1 capsule by mouth every morning (before breakfast)     Dispense:  90 capsule     Refill:  1             No follow-ups on file.     Dr. Crista Fox      20  10:17 AM

## 2020-09-29 ENCOUNTER — TELEPHONE (OUTPATIENT)
Dept: FAMILY MEDICINE CLINIC | Age: 30
End: 2020-09-29

## 2020-09-29 NOTE — TELEPHONE ENCOUNTER
I doubt it is the prednisone as prednisone is used to treat reaction such as this. Also her body makes prednisone. I would try 1 pill to see how it affects her and let us know how she does today.

## 2020-09-29 NOTE — TELEPHONE ENCOUNTER
Pt called office post yesterday's vv. Jennifer Youngblood states last night she woke in out of a \"dead sleep\" about 3:30am w/ face feeling like she was turning red flushed feeling  She states she then has sensation of prickly from her chest up to her face. Pt took Benadryl at 4am and this morning, pt states the sx wore off and has not taken dose of prednisone today. Asking Dr Lazara Monge advise.     conf local pharm [facility]

## 2020-10-22 RX ORDER — PROPRANOLOL HCL 60 MG
CAPSULE, EXTENDED RELEASE 24HR ORAL
Qty: 90 CAPSULE | Refills: 3 | Status: SHIPPED | OUTPATIENT
Start: 2020-10-22 | End: 2021-05-05 | Stop reason: SDUPTHER

## 2020-11-11 PROBLEM — R25.2 MUSCLE CRAMPS: Status: ACTIVE | Noted: 2020-11-11

## 2020-11-11 PROBLEM — G44.209 TENSION HEADACHE: Status: ACTIVE | Noted: 2020-11-11

## 2020-11-11 PROBLEM — R26.0 ATAXIC GAIT: Status: ACTIVE | Noted: 2020-11-11

## 2020-11-11 PROBLEM — M54.2 CERVICALGIA: Status: ACTIVE | Noted: 2020-11-11

## 2020-11-11 PROBLEM — R51.9 HEADACHE: Status: ACTIVE | Noted: 2018-11-05

## 2020-11-12 ENCOUNTER — VIRTUAL VISIT (OUTPATIENT)
Dept: NEUROLOGY | Age: 30
End: 2020-11-12
Payer: COMMERCIAL

## 2020-11-12 PROCEDURE — G8484 FLU IMMUNIZE NO ADMIN: HCPCS | Performed by: NURSE PRACTITIONER

## 2020-11-12 PROCEDURE — 4004F PT TOBACCO SCREEN RCVD TLK: CPT | Performed by: NURSE PRACTITIONER

## 2020-11-12 PROCEDURE — 99213 OFFICE O/P EST LOW 20 MIN: CPT | Performed by: NURSE PRACTITIONER

## 2020-11-12 PROCEDURE — G8420 CALC BMI NORM PARAMETERS: HCPCS | Performed by: NURSE PRACTITIONER

## 2020-11-12 PROCEDURE — G8427 DOCREV CUR MEDS BY ELIG CLIN: HCPCS | Performed by: NURSE PRACTITIONER

## 2020-11-12 RX ORDER — ERENUMAB-AOOE 140 MG/ML
140 INJECTION, SOLUTION SUBCUTANEOUS
Qty: 1 PEN | Refills: 5 | Status: SHIPPED | OUTPATIENT
Start: 2020-11-12 | End: 2021-02-24

## 2020-11-12 RX ORDER — SUMATRIPTAN 6 MG/.5ML
6 INJECTION, SOLUTION SUBCUTANEOUS
Qty: 0.5 ML | Refills: 3 | Status: CANCELLED | OUTPATIENT
Start: 2020-11-12 | End: 2020-11-12

## 2020-11-12 RX ORDER — SUMATRIPTAN 6 MG/.5ML
6 INJECTION, SOLUTION SUBCUTANEOUS
COMMUNITY
Start: 2020-06-29 | End: 2021-04-14

## 2020-11-12 ASSESSMENT — ENCOUNTER SYMPTOMS
COLOR CHANGE: 0
VOMITING: 1
TROUBLE SWALLOWING: 0
CHEST TIGHTNESS: 0
WHEEZING: 0
NAUSEA: 1
PHOTOPHOBIA: 1
SHORTNESS OF BREATH: 0
COUGH: 0

## 2020-11-12 NOTE — PROGRESS NOTES
Subjective:      Patient ID: Santos Turner is a 31 y.o. female who presents today for:  Chief Complaint   Patient presents with    Follow-up     Pt states that things are not going good. imetrex is helping some. She states that she has gotten 3 this week on tuesday it lasted all day and then yesterday it lasted a few hours. HPI  Pt contacted via telephone for virtual visit for follow-up regarding migraine headaches. I was in my office and she was at home. 11/12/2020:  Patient currently alert and oriented x3. She reports ongoing migraine headaches that are continuous and daily. Patient was prescribed Zanaflex in August and reported no improvement with her headaches with this treatment. She has tried and failed multiple treatments as documented at her initial visit. When last seen she was reluctant to start preventative treatment as she is a fear of side effects/allergic reaction due to her multiple allergies. She has Imitrex for rescue medication although we are reluctant to continue prescribing this as she reports that she has continued chest pain and is on Ranexa and follows with cardiology. We have prescribed Ubrelvy for her which is under appeal currently. She did have MRI of the brain done on 9/15/2020 that was negative for any acute intracranial changes, ischemia or hemorrhage. She did have dysgenesis of corpus callosum which could explain some of her other underlying symptoms. Also micrognathia was noted. Patient had positive ETHAN and was referred to rheumatology for further work-up. She has since had a visit with them and has further laboratory testing pending. There are no new or acute neuro complaints or concerns voiced today. Due to COVID 19 outbreak, patient's office visit was converted to a virtual visit.   Patient was contacted and agreed to proceed with a virtual visit via Telephone Visit  The risks and benefits of converting to a virtual visit were discussed in light of the has weakness of extremities at times and that she will drop things often. She reports frequent lightheadedness with standing. Patient reports difficulty with ambulation at times and feeling off balance. She denies any significant weight loss that was unplanned, rash, joint pain or swelling. No reports of diplopia, dysphagia, dysarthria, paresthesias or one-sided weakness. Patient does have history of chest pain for which she has seen cardiology and had a work-up done in the past.  She is currently on Ranexa and propranolol. Patient reports that often times she will be severely fatigued and will not have energy to go about her day.   Past Medical History:   Diagnosis Date    Anemia     Congenital anomaly of corpus callosum (HCC)     Encephalopathy     Intractable migraine without aura and without status migrainosus 2017    Kidney stones     Micrognathia     Postpartum depression 2018    Precordial pain 2019    Unspecified asthma(493.90)      Past Surgical History:   Procedure Laterality Date     SECTION      DILATION AND CURETTAGE OF UTERUS N/A 2019    SUCTION DILATATION AND CURETTAGE performed by Shana Sultana MD at [address] [place] [address], VAGINAL N/A 2020    [facility], BILATERAL SALPINGECTOMY performed by Shana Sultana MD at [place] [address]      laparascopic sx    TONSILLECTOMY       Social History     Socioeconomic History    Marital status:      Spouse name: Not on file    Number of children: Not on file    Years of education: Not on file    Highest education level: Not on file   Occupational History    Not on file   Social Needs    Financial resource strain: Not on file    Food insecurity     Worry: Not on file     Inability: Not on file    Transportation needs     Medical: Not on file     Non-medical: Not on file   Tobacco Use    Smoking status: Light Tobacco Smoker     Packs/day: 0.25     Years: 20.00     Pack years: 5.00 three days, 3 PO each day for three days, 2 PO each day for three days ,1 PO each day until gone 30 tablet 0    omeprazole (PRILOSEC) 20 MG delayed release capsule Take 1 capsule by mouth every morning (before breakfast) 90 capsule 1    BD INSULIN SYRINGE U/F 31G X 5/16\" 0.5 ML MISC USE AS DIRECTED WITH SUMATRIPTAN      tiZANidine (ZANAFLEX) 4 MG tablet Take 1 tablet by mouth nightly 30 tablet 0    ranolazine (RANEXA) 500 MG extended release tablet Take 1 tablet by mouth 2 times daily 60 tablet 5    topiramate (TOPAMAX) 50 MG tablet Take 1 tablet by mouth daily 90 tablet 1    ondansetron (ZOFRAN) 8 MG tablet Take 1 tablet by mouth every 8 hours as needed for Nausea or Vomiting 40 tablet 0    simethicone (MYLICON) 80 MG chewable tablet Take 1 tablet by mouth 4 times daily as needed for Flatulence 180 tablet 1    nitroGLYCERIN (NITROSTAT) 0.4 MG SL tablet Place 1 tablet under the tongue every 5 minutes as needed for Chest pain 25 tablet 3    metoclopramide (REGLAN) 10 MG tablet Take 1 tablet by mouth every 6 hours as needed (N/V) 30 tablet 0     No current facility-administered medications on file prior to visit. Review of Systems   Constitutional: Negative for appetite change, chills, fatigue and fever. HENT: Negative for hearing loss and trouble swallowing. Eyes: Positive for photophobia. Negative for visual disturbance. Respiratory: Negative for cough, chest tightness, shortness of breath and wheezing. Cardiovascular: Negative for chest pain, palpitations and leg swelling. Gastrointestinal: Positive for nausea and vomiting. Musculoskeletal: Negative for gait problem. Skin: Negative for color change and rash. Neurological: Positive for dizziness and headaches. Negative for tremors, seizures, syncope, facial asymmetry, speech difficulty, weakness, light-headedness and numbness. Psychiatric/Behavioral: Positive for sleep disturbance.  Negative for agitation, confusion and hallucinations. The patient is not nervous/anxious. Objective:   LMP 01/03/2020     Physical Exam  Deferred due to telephone encounter  Xr Cervical Spine (4-5 Views)    Result Date: 9/15/2020  EXAMINATION:  CERVICAL SPINE CLINICAL HISTORY: Generalized pain. No history of trauma reported COMPARISON: March 11, 2009 5 views of the cervical spine are submitted. There is mild straightening of the normal expected cervical lordosis. Prevertebral soft tissues are unremarkable. Disk spaces are preserved. No acute fracture. No spondylolisthesis. Neural foramina are grossly patent. NO ACUTE FRACTURE     Mri Brain Wo Contrast    Result Date: 9/16/2020  EXAMINATION: MRI BRAIN WO CONTRAST CLINICAL HISTORY: R26.0 Ataxic gait ICD10 COMPARISONS: Brain MRI from January 22, 2019 TECHNIQUE: Multiplanar multisequence images of the brain were obtained without contrast. Diffusion perfusion imaging was obtained. FINDINGS:  There are no extra-axial collections. There is no evidence of hemorrhage. There are no areas of signal abnormality on perfusion diffusion imaging to suggest ischemia. The susceptibility images do not demonstrate evidence of hemosiderin deposition within  the brain parenchyma or the leptomeninges. There is preservation of the gray-white matter differentiation. There are no areas of signal abnormality within the brain parenchyma or the posterior fossa to suggest lesion. The sulci and ventricles are within normal limits without evidence of hydrocephalus. There is redemonstration of dysgenesis of the posterior body of the corpus callosum which is unchanged since the prior study. The region of the pineal gland and the sella turcica are unremarkable. There are no space-occupying lesions in the posterior fossa. The basilar cisterns are patent. The craniocervical junction is unremarkable. The visualized portions of the orbits are within normal limits, the globes are intact.  The visualized portions of the paranasal sinuses are within normal limits. The calvarium and soft tissues are unremarkable. There is the appearance of micrognathia. There are no acute intracranial changes. There is no evidence of ischemia or hemorrhage. There are no signal abnormalities or areas of abnormal enhancement after IV contrast administration. There is redemonstration of dysgenesis of corpus callosum, stable since most recent prior study from January 22, 2019, images of the face are suggestive of micrognathia. Etiology is uncertain, there may sometimes be an association with a genomic syndrome. Lab Results   Component Value Date    WBC 7.8 07/27/2020    RBC 4.11 07/27/2020    HGB 13.8 07/27/2020    HCT 40.1 07/27/2020    MCV 97.6 07/27/2020    MCH 33.6 07/27/2020    MCHC 34.5 07/27/2020    RDW 12.7 07/27/2020     07/27/2020    MPV 10.2 10/07/2014     Lab Results   Component Value Date     07/27/2020    K 4.4 07/27/2020     07/27/2020    CO2 23 07/27/2020    BUN 9 07/27/2020    CREATININE 0.72 07/27/2020    GFRAA >60.0 07/27/2020    LABGLOM >60.0 07/27/2020    GLUCOSE 116 07/27/2020    PROT 7.3 07/27/2020    LABALBU 4.6 07/27/2020    CALCIUM 9.3 07/27/2020    BILITOT 0.3 07/27/2020    ALKPHOS 44 07/27/2020    AST 15 07/27/2020    ALT 9 07/27/2020     Lab Results   Component Value Date    PROTIME 13.8 12/10/2019    INR 1.0 12/10/2019     Lab Results   Component Value Date    TSH 1.610 08/07/2018     Lab Results   Component Value Date    TRIG 151 08/07/2018    HDL 61 08/07/2018    LDLCALC 28 08/07/2018     Lab Results   Component Value Date    LABAMPH Neg 11/22/2019    BARBSCNU Neg 11/22/2019    LABBENZ Neg 11/22/2019    LABMETH Neg 11/22/2019    OPIATESCREENURINE Neg 11/22/2019    PHENCYCLIDINESCREENURINE Neg 11/22/2019     No results found for: LITHIUM, DILFRTOT, VALPROATE    Assessment and Plan:      1.  Intractable migraine without aura and without status migrainosus  - Patient being seen for migraine headache that began in childhood at the approximate age of 8. Patient reports continual daily migraine headaches that will sometimes last up to 2 weeks. She denies associated visual aura. She does have associated nausea and vomiting, dizziness, phonophobia. Patient is tried multiple medications in the past including over-the-counter acetaminophen and NSAIDs, amitriptyline, Topamax, propranolol, sumatriptan, zanaflex. Patient reports little relief with preventative meds. She does get some relief from the sumatriptan but at this time we advise against that given her ongoing complaints of chest pain and cardiac issues. Patient has been reluctant in the past to start new preventative medication as she has multiple allergies and is fear of having allergic reaction. She is agreeable today to try Aimovig. We will continue her on Ubrelvy, once approved, for rescue treatment. - Erenumab-aooe (AIMOVIG) 140 MG/ML SOAJ; Inject 140 mg into the skin every 30 days  Dispense: 1 pen; Refill: 5    Total telephone encounter time 27 minutes    Return in about 3 months (around 2/12/2021), or if symptoms worsen or fail to improve.     David Null, APRN - CNP

## 2020-11-13 ENCOUNTER — TELEPHONE (OUTPATIENT)
Dept: NEUROLOGY | Age: 30
End: 2020-11-13

## 2020-11-13 NOTE — TELEPHONE ENCOUNTER
Faxed PA for aimovig via [employer] is reviewing your PA request and will respond within 24 hours for Medicaid or up to 72 hours for non-Medicaid plans, based on the required timeframe determined by state or federal regulations. To check for an update later, open this request from your dashboard.       Key [id number]

## 2020-12-16 ENCOUNTER — VIRTUAL VISIT (OUTPATIENT)
Dept: FAMILY MEDICINE CLINIC | Age: 30
End: 2020-12-16
Payer: COMMERCIAL

## 2020-12-16 PROCEDURE — 99213 OFFICE O/P EST LOW 20 MIN: CPT | Performed by: FAMILY MEDICINE

## 2020-12-16 PROCEDURE — G8427 DOCREV CUR MEDS BY ELIG CLIN: HCPCS | Performed by: FAMILY MEDICINE

## 2020-12-16 RX ORDER — AZITHROMYCIN 250 MG/1
TABLET, FILM COATED ORAL
Qty: 2 PACKET | Refills: 0 | Status: SHIPPED | OUTPATIENT
Start: 2020-12-16 | End: 2020-12-26

## 2020-12-16 NOTE — PROGRESS NOTES
Patient: Shabnam Leung    YOB: 1990    Date: 20    Chief Complaint   Patient presents with    Congestion       Patient Active Problem List    Diagnosis Date Noted    Tension headache 2020    Muscle cramps 2020    Ataxic gait 2020    Cervicalgia 2020    Encephalopathy     Abnormal uterine bleeding (AUB)     Precordial pain 2019    , missed     Headache 2018    Postpartum depression 2018    Patient underweight 2018    Anxiety 2018    Abnormal glucose tolerance in mother complicating pregnancy     Pelvic pain affecting pregnancy in third trimester, antepartum     Supervision of normal pregnancy 2017    Intractable migraine without aura and without status migrainosus 2017    Heart palpitations 2017    Chronic diarrhea of unknown origin 2017    Back pain affecting pregnancy in second trimester 2017    Previous  delivery affecting pregnancy 2017     (vaginal birth after )     Leakage of amniotic fluid      contractions     34 weeks gestation of pregnancy     Anemia affecting pregnancy        Allergies   Allergen Reactions    Acyclovir And Related Swelling    Ibuprofen Hives    Morphine Itching    Pcn [Penicillins] Anaphylaxis and Hives    Amoxicillin Hives and Swelling     Throat swelling    Cephalexin Hives    Macrobid [Nitrofurantoin Macrocrystal] Hives    Hydrocodone-Acetaminophen Itching    Oxycodone-Acetaminophen Itching           There were no vitals filed for this visit. There is no height or weight on file to calculate BMI. HPI    TELEHEALTH EVALUATION -- Audio/Visual (During OVPYR-18 public health emergency        Due to COVID 19 outbreak, patient's office visit was converted to a virtual visit. The patient was identified at the start of the visit. Patient was contacted and agreed to proceed with a virtual visit via Doxy. me Time spent in visit with management in direct patient care 16 minutes. The risks and benefits of converting to a virtual visit were discussed in light of the current infectious disease epidemic. Patient also understood that insurance coverage and co-pays are up to their individual insurance plans and this is a billable visit. Pursuant to the emergency declaration under the [address] [place] [address] authority and the [employer] Act, this Virtual  Visit was conducted, with patient's consent, to reduce the patient's risk of exposure to COVID-19 and provide continuity of care for an established patient. Services were provided through a video discussion virtually to substitute for in-person clinic visit. The patient was located home and myself, the provider is located home. Patient opal Monae (:  [birth year] ) has requested an audio/video evaluation for the following concern(s):     HPI:  Kids were sick with sinusitis and on amoxil. Now she has congestion and green nasal discharge. No fever. Minimal cough. No exposure to COVID 19. Review of Systems    Constitutional: Negative for fatigue, fever and sweats. HEENT: Negative for eye discharge and vision loss. Negative for ear drainage, hearing loss and nasal drainage. Respiratory: Negative for cough, dyspnea and wheezing. Cardiovascular:  Negative for chest pain, claudication and irregular heartbeat/palpitations. Gastrointestinal: Negative for abdominal pain, nausea, constipation and diarrhea. Genitourinary: Negative for dysuria, hematuria, polyuria, dysmenorrhea, menorrhagia and vaginal discharge. Metabolic/Endocrine: Negative for cold intolerance, heat intolerance, polydipsia and polyphagia. No unintended weight loss or weight gain. Neuro/Psychiatric: Negative for gait disturbance. Negative for psychiatric symptoms. Dermatologic: Negative for pruritus and rash. Musculoskeletal: Negative for bone/joint symptoms. No numbness or tingling. No loss of function. Hematology: Negative for bleeding and easy bruising. Immunology:  Negative for environmental allergies and food allergies. Physical Exam    Patient seen on video and is alert, comfortable in no acute distress. The patient is comfortable and appropriate and oriented. Able to speak without difficulty. Assessment:   Diagnosis Orders   1. Acute recurrent maxillary sinusitis  azithromycin (ZITHROMAX Z-TUNG) 250 MG tablet              Plan:  Current Outpatient Medications   Medication Sig Dispense Refill    azithromycin (ZITHROMAX Z-TUNG) 250 MG tablet Complete entire pack as directed.  2 packet 0    SUMAtriptan (IMITREX) 6 MG/0.5ML SOLN injection Inject 6 mg into the skin      Erenumab-aooe (AIMOVIG) 140 MG/ML SOAJ Inject 140 mg into the skin every 30 days 1 pen 5    propranolol (INDERAL LA) 60 MG extended release capsule TAKE 1 CAPSULE BY MOUTH EVERY DAY 90 capsule 3    predniSONE (DELTASONE) 10 MG tablet 4 PO each day for three days, 3 PO each day for three days, 2 PO each day for three days ,1 PO each day until gone 30 tablet 0    omeprazole (PRILOSEC) 20 MG delayed release capsule Take 1 capsule by mouth every morning (before breakfast) 90 capsule 1    BD INSULIN SYRINGE U/F 31G X 5/16\" 0.5 ML MISC USE AS DIRECTED WITH SUMATRIPTAN      tiZANidine (ZANAFLEX) 4 MG tablet Take 1 tablet by mouth nightly 30 tablet 0    ranolazine (RANEXA) 500 MG extended release tablet Take 1 tablet by mouth 2 times daily 60 tablet 5    topiramate (TOPAMAX) 50 MG tablet Take 1 tablet by mouth daily 90 tablet 1    ondansetron (ZOFRAN) 8 MG tablet Take 1 tablet by mouth every 8 hours as needed for Nausea or Vomiting 40 tablet 0

## 2020-12-21 RX ORDER — TOPIRAMATE 50 MG/1
TABLET, FILM COATED ORAL
Qty: 90 TABLET | Refills: 1 | Status: SHIPPED | OUTPATIENT
Start: 2020-12-21 | End: 2021-02-02

## 2021-01-10 DIAGNOSIS — R07.2 PRECORDIAL PAIN: ICD-10-CM

## 2021-01-11 RX ORDER — RANOLAZINE 500 MG/1
TABLET, EXTENDED RELEASE ORAL
Qty: 180 TABLET | Refills: 1 | Status: SHIPPED | OUTPATIENT
Start: 2021-01-11 | End: 2021-05-05 | Stop reason: SDUPTHER

## 2021-01-11 NOTE — TELEPHONE ENCOUNTER
Requesting medication refill. Please approve or deny this request.    Rx requested:  Requested Prescriptions     Pending Prescriptions Disp Refills    ranolazine (RANEXA) 500 MG extended release tablet [Pharmacy Med Name: RANOLAZINE  MG TABLET] 180 tablet 1     Sig: TAKE 1 TABLET BY MOUTH TWICE A DAY       Last Office Visit:   12/16/2020    Last Filled:      Last Labs:      Next Visit Date:  No future appointments.

## 2021-01-20 ENCOUNTER — TELEPHONE (OUTPATIENT)
Dept: FAMILY MEDICINE CLINIC | Age: 31
End: 2021-01-20

## 2021-01-20 RX ORDER — FLUCONAZOLE 150 MG/1
150 TABLET ORAL ONCE
Qty: 1 TABLET | Refills: 0 | Status: SHIPPED | OUTPATIENT
Start: 2021-01-20 | End: 2021-01-20

## 2021-01-20 NOTE — TELEPHONE ENCOUNTER
Meg Hanson has a yeast infection. Would you send medication to [facility] for her?

## 2021-02-02 ENCOUNTER — TELEPHONE (OUTPATIENT)
Dept: FAMILY MEDICINE CLINIC | Age: 31
End: 2021-02-02

## 2021-02-02 DIAGNOSIS — G43.019 INTRACTABLE MIGRAINE WITHOUT AURA AND WITHOUT STATUS MIGRAINOSUS: ICD-10-CM

## 2021-02-02 RX ORDER — TOPIRAMATE 50 MG/1
50 TABLET, FILM COATED ORAL 2 TIMES DAILY
Qty: 180 TABLET | Refills: 1 | Status: SHIPPED | OUTPATIENT
Start: 2021-02-02 | End: 2021-04-14

## 2021-02-02 NOTE — TELEPHONE ENCOUNTER
She was in the office with her  and mentioned that her migraines are still breaking through the medicine that she had. She contacted her neurologist and has not heard back yet. I recommended we increase her Topamax and she will let me know how this affects her.

## 2021-02-19 ENCOUNTER — TELEPHONE (OUTPATIENT)
Dept: FAMILY MEDICINE CLINIC | Age: 31
End: 2021-02-19

## 2021-02-19 NOTE — TELEPHONE ENCOUNTER
Mary Mckeon calls to let you know that she has developed another cyst in front of the right ear. You wanted her to let you know. She may go to the [facility] to have it looked at.   KEYSHAWN

## 2021-02-24 ENCOUNTER — VIRTUAL VISIT (OUTPATIENT)
Dept: FAMILY MEDICINE CLINIC | Age: 31
End: 2021-02-24
Payer: COMMERCIAL

## 2021-02-24 DIAGNOSIS — J32.4 CHRONIC PANSINUSITIS: Primary | ICD-10-CM

## 2021-02-24 DIAGNOSIS — R04.0 POSTERIOR EPISTAXIS: ICD-10-CM

## 2021-02-24 PROCEDURE — G8484 FLU IMMUNIZE NO ADMIN: HCPCS | Performed by: FAMILY MEDICINE

## 2021-02-24 PROCEDURE — G8427 DOCREV CUR MEDS BY ELIG CLIN: HCPCS | Performed by: FAMILY MEDICINE

## 2021-02-24 PROCEDURE — 99214 OFFICE O/P EST MOD 30 MIN: CPT | Performed by: FAMILY MEDICINE

## 2021-02-24 PROCEDURE — 4004F PT TOBACCO SCREEN RCVD TLK: CPT | Performed by: FAMILY MEDICINE

## 2021-02-24 PROCEDURE — G8420 CALC BMI NORM PARAMETERS: HCPCS | Performed by: FAMILY MEDICINE

## 2021-02-24 RX ORDER — LEVOFLOXACIN 500 MG/1
500 TABLET, FILM COATED ORAL DAILY
Qty: 10 TABLET | Refills: 0 | Status: SHIPPED | OUTPATIENT
Start: 2021-02-24 | End: 2021-03-06

## 2021-02-24 RX ORDER — METHYLPREDNISOLONE 4 MG/1
TABLET ORAL
Qty: 1 KIT | Refills: 0 | Status: SHIPPED | OUTPATIENT
Start: 2021-02-24 | End: 2021-04-14

## 2021-02-24 SDOH — ECONOMIC STABILITY: FOOD INSECURITY: WITHIN THE PAST 12 MONTHS, YOU WORRIED THAT YOUR FOOD WOULD RUN OUT BEFORE YOU GOT MONEY TO BUY MORE.: NEVER TRUE

## 2021-02-24 SDOH — ECONOMIC STABILITY: TRANSPORTATION INSECURITY
IN THE PAST 12 MONTHS, HAS LACK OF TRANSPORTATION KEPT YOU FROM MEETINGS, WORK, OR FROM GETTING THINGS NEEDED FOR DAILY LIVING?: NO

## 2021-02-24 SDOH — ECONOMIC STABILITY: INCOME INSECURITY: HOW HARD IS IT FOR YOU TO PAY FOR THE VERY BASICS LIKE FOOD, HOUSING, MEDICAL CARE, AND HEATING?: NOT VERY HARD

## 2021-02-24 NOTE — PROGRESS NOTES
Patient: Laron Coley (: 1990) is a 31 y.o. female,Established patient, here for evaluation of the following chief complaint(s):  Chief Complaint   Patient presents with    Sinus Problem     She complains of having sinus pain, pressure, headache and sinus drainage. Bloody taste in mouth, feels like she has a lump in her throat. Date: 21    Allergies   Allergen Reactions    Acyclovir And Related Swelling    Ibuprofen Hives    Morphine Itching    Pcn [Penicillins] Anaphylaxis and Hives    Amoxicillin Hives and Swelling     Throat swelling    Cephalexin Hives    Macrobid [Nitrofurantoin Macrocrystal] Hives    Hydrocodone-Acetaminophen Itching    Oxycodone-Acetaminophen Itching       There were no vitals filed for this visit. There is no height or weight on file to calculate BMI. PHQ Scores 2020   PHQ2 Score 0 0   PHQ9 Score 0 0     Interpretation of Total Score Depression Severity: 1-4 = Minimal depression, 5-9 = Mild depression, 10-14 = Moderate depression, 15-19 = Moderately severe depression, 20-27 = Severe depression    HPI    Over the last two weeks increasing sinus pressure and PND tasting of blood. Has nose cauterized as a child. No fever. Has headache and facial pressure. No cough. Blood nauseates her. Is a smoker ans so is her     Review of Systems    Constitutional: Negative for fatigue, fever and sweats. HEENT: Negative for eye discharge and vision loss. Negative for ear drainage, hearing loss and positive for nasal drainage. Respiratory: Negative for cough, dyspnea and wheezing. Cardiovascular:  Negative for chest pain, claudication and irregular heartbeat/palpitations. Gastrointestinal: Negative for abdominal pain, nausea, constipation and diarrhea. Genitourinary: Negative for dysuria,patient had a hysterectomy. Metabolic/Endocrine: Negative for cold intolerance, heat intolerance, polydipsia and polyphagia.  No unintended weight loss or Coronavirus Preparedness and Response Supplemental Appropriations Act, this Virtual Visit was conducted with patient's (and/or legal guardian's) consent, to reduce the patient's risk of exposure to COVID-19 and provide necessary medical care. The patient (and/or legal guardian) has also been advised to contact this office for worsening conditions or problems, and seek emergency medical treatment and/or call 911 if deemed necessary. Physical Exam    Patient seen on video and is alert, comfortable in no acute distress. The patient is comfortable and appropriate and oriented. Able to speak without difficulty. Assessment:   Diagnosis Orders   1. Chronic pansinusitis  levoFLOXacin (LEVAQUIN) 500 MG tablet    methylPREDNISolone (MEDROL, TUNG,) 4 MG tablet    CT SINUS WO CONTRAST   2.  Posterior epistaxis  CT SINUS WO CONTRAST        If symptoms persist will need ENT eval    Plan:  Current Outpatient Medications   Medication Sig Dispense Refill    levoFLOXacin (LEVAQUIN) 500 MG tablet Take 1 tablet by mouth daily for 10 days 10 tablet 0    methylPREDNISolone (MEDROL, TUNG,) 4 MG tablet Take as directed 1 kit 0    topiramate (TOPAMAX) 50 MG tablet Take 1 tablet by mouth 2 times daily 180 tablet 1    ranolazine (RANEXA) 500 MG extended release tablet TAKE 1 TABLET BY MOUTH TWICE A  tablet 1    propranolol (INDERAL LA) 60 MG extended release capsule TAKE 1 CAPSULE BY MOUTH EVERY DAY 90 capsule 3    omeprazole (PRILOSEC) 20 MG delayed release capsule Take 1 capsule by mouth every morning (before breakfast) 90 capsule 1    BD INSULIN SYRINGE U/F 31G X 5/16\" 0.5 ML MISC USE AS DIRECTED WITH SUMATRIPTAN      ondansetron (ZOFRAN) 8 MG tablet Take 1 tablet by mouth every 8 hours as needed for Nausea or Vomiting 40 tablet 0    simethicone (MYLICON) 80 MG chewable tablet Take 1 tablet by mouth 4 times daily as needed for Flatulence 180 tablet 1    nitroGLYCERIN (NITROSTAT) 0.4 MG SL tablet Place 1 tablet under the tongue every 5 minutes as needed for Chest pain 25 tablet 3    metoclopramide (REGLAN) 10 MG tablet Take 1 tablet by mouth every 6 hours as needed (N/V) 30 tablet 0    SUMAtriptan (IMITREX) 6 MG/0.5ML SOLN injection Inject 6 mg into the skin       No current facility-administered medications for this visit. Orders Placed This Encounter   Procedures    CT SINUS WO CONTRAST     Standing Status:   Future     Standing Expiration Date:   2/24/2022       Orders Placed This Encounter   Medications    levoFLOXacin (LEVAQUIN) 500 MG tablet     Sig: Take 1 tablet by mouth daily for 10 days     Dispense:  10 tablet     Refill:  0    methylPREDNISolone (MEDROL, TUNG,) 4 MG tablet     Sig: Take as directed     Dispense:  1 kit     Refill:  0             Return in about 3 weeks (around 3/17/2021), or VV Doxy [place]. Services were provided through a video synchronous discussion virtually to substitute for in-person clinic visit. Patient and provider were located at their individual homes or non office locations. An electronic signature was used to authenticate this note.   Dr. Claudean Rosales      2/24/21  11:12 AM

## 2021-03-07 ENCOUNTER — HOSPITAL ENCOUNTER (OUTPATIENT)
Dept: CT IMAGING | Age: 31
Discharge: HOME | End: 2021-03-09
Payer: COMMERCIAL

## 2021-03-07 DIAGNOSIS — R04.0 POSTERIOR EPISTAXIS: ICD-10-CM

## 2021-03-07 DIAGNOSIS — J32.4 CHRONIC PANSINUSITIS: ICD-10-CM

## 2021-03-07 PROCEDURE — 70486 CT MAXILLOFACIAL W/O DYE: CPT

## 2021-03-09 DIAGNOSIS — J32.4 CHRONIC PANSINUSITIS: Primary | ICD-10-CM

## 2021-03-12 ENCOUNTER — TELEPHONE (OUTPATIENT)
Dept: FAMILY MEDICINE CLINIC | Age: 31
End: 2021-03-12

## 2021-03-12 NOTE — TELEPHONE ENCOUNTER
----- Message from Noelle Jin MD sent at 3/9/2021  9:52 AM EST -----  Her insurance company will not agree to the CT of her sinuses. I have ordered an XR to get some information. Let her know.  Thanks

## 2021-04-14 ENCOUNTER — OFFICE VISIT (OUTPATIENT)
Dept: NEUROLOGY | Age: 31
End: 2021-04-14
Payer: COMMERCIAL

## 2021-04-14 VITALS
BODY MASS INDEX: 20.4 KG/M2 | SYSTOLIC BLOOD PRESSURE: 121 MMHG | WEIGHT: 101 LBS | HEART RATE: 81 BPM | DIASTOLIC BLOOD PRESSURE: 87 MMHG

## 2021-04-14 DIAGNOSIS — G43.019 INTRACTABLE MIGRAINE WITHOUT AURA AND WITHOUT STATUS MIGRAINOSUS: Primary | ICD-10-CM

## 2021-04-14 DIAGNOSIS — G44.209 TENSION HEADACHE: ICD-10-CM

## 2021-04-14 PROCEDURE — 99213 OFFICE O/P EST LOW 20 MIN: CPT | Performed by: NURSE PRACTITIONER

## 2021-04-14 PROCEDURE — G8420 CALC BMI NORM PARAMETERS: HCPCS | Performed by: NURSE PRACTITIONER

## 2021-04-14 PROCEDURE — 4004F PT TOBACCO SCREEN RCVD TLK: CPT | Performed by: NURSE PRACTITIONER

## 2021-04-14 PROCEDURE — G8427 DOCREV CUR MEDS BY ELIG CLIN: HCPCS | Performed by: NURSE PRACTITIONER

## 2021-04-14 ASSESSMENT — ENCOUNTER SYMPTOMS
COUGH: 0
NAUSEA: 0
COLOR CHANGE: 0
WHEEZING: 0
TROUBLE SWALLOWING: 0
VOMITING: 0
SHORTNESS OF BREATH: 0
CHEST TIGHTNESS: 0

## 2021-04-14 NOTE — PROGRESS NOTES
Subjective:      Patient ID: Celso Whitman is a 27 y.o. female who presents today for:  Chief Complaint   Patient presents with    Follow-up     Pt states that the topamax and sumatriptin did not do anything for her and she stopped taking it. She also states she has tried all the injections as well and they did nothing for her. She says that she has a constant migraine, everyday pt claims over 25 migriaines within a weeks time. She states that she knows what is causing them is from a whole in her brain and she wants to know if there is something that can be one to fix it. She says she becomes disoriented and causes her a lot of other issues.  Other     She says even right now she has a horrible migraine, that she can litterally feel the blood pumping through her head. She states that she almost fell out of the shower yesterday her  had to catch her because she continues with being disoriented all the time. She says that her eyes hurt her badly because there is a build up of pressure right between he eyes. HPI     4/14/2021:  Patient seen for 6-month follow-up for migraine headaches with tension type headaches. Patient is currently alert and oriented x3 and accompanied by a family member. Patient very upset during her visit. Tearful and crying. Yelling at times and smacking her hands together. Stating that she knows that something is wrong with her and that she can feel a hole in her head and feel fluid draining down the back of her neck. She reports that she continues to have daily headaches that are located on top of her head and frontally. She reports that they are interfering with her quality of life and her ability to perform activities of daily living. Of note patient has not called our office 1 time the last 6 months to request further assistant for headache management or to notify us that her headaches were still ongoing.   She has been on multiple treatments for migraine headaches including amitriptyline, Topamax, propranolol, sumatriptan, Zanaflex and Aimovig. Andrea Snyder was prescribed most recently on 11/12/2020. Patient states she did 1 injection in February and did not have good results and did not do any subsequent monthly injections. The only medication that gave her any relief was sumatriptan, which was minimal, but it is contraindicated given her ongoing chest pain and cardiac issues. 11/12/2020:  Patient currently alert and oriented x3. She reports ongoing migraine headaches that are continuous and daily. Patient was prescribed Zanaflex in August and reported no improvement with her headaches with this treatment. She has tried and failed multiple treatments as documented at her initial visit. When last seen she was reluctant to start preventative treatment as she is a fear of side effects/allergic reaction due to her multiple allergies. She has Imitrex for rescue medication although we are reluctant to continue prescribing this as she reports that she has continued chest pain and is on Ranexa and follows with cardiology. We have prescribed Ubrelvy for her which is under appeal currently. She did have MRI of the brain done on 9/15/2020 that was negative for any acute intracranial changes, ischemia or hemorrhage. She did have dysgenesis of corpus callosum which could explain some of her other underlying symptoms. Also micrognathia was noted. Patient had positive ETHAN and was referred to rheumatology for further work-up. She has since had a visit with them and has further laboratory testing pending. There are no new or acute neuro complaints or concerns voiced today. Due to COVID 19 outbreak, patient's office visit was converted to a virtual visit.   Patient was contacted and agreed to proceed with a virtual visit via Telephone Visit  The risks and benefits of converting to a virtual visit were discussed in light of the current infectious disease extremities at times and that she will drop things often.  She reports frequent lightheadedness with standing.  Patient reports difficulty with ambulation at times and feeling off balance.  She denies any significant weight loss that was unplanned, rash, joint pain or swelling.  No reports of diplopia, dysphagia, dysarthria, paresthesias or one-sided weakness.  Patient does have history of chest pain for which she has seen cardiology and had a work-up done in the past. Britton Parson is currently on Ranexa and propranolol.  Patient reports that often times she will be severely fatigued and will not have energy to go about her day.      Past Medical History:   Diagnosis Date    Anemia     Congenital anomaly of corpus callosum (HCC)     Encephalopathy     Intractable migraine without aura and without status migrainosus 2017    Kidney stones     Micrognathia     Postpartum depression 2018    Precordial pain 2019    Unspecified asthma(493.90)      Past Surgical History:   Procedure Laterality Date     SECTION      DILATION AND CURETTAGE OF UTERUS N/A 2019    SUCTION DILATATION AND CURETTAGE performed by Tessa Simmons MD at [address] [place] [address], VAGINAL N/A 2020    [facility], BILATERAL SALPINGECTOMY performed by Tessa Simmons MD at [place] [address]      laparascopic sx    TONSILLECTOMY       Social History     Socioeconomic History    Marital status:      Spouse name: Not on file    Number of children: Not on file    Years of education: Not on file    Highest education level: Not on file   Occupational History    Not on file   Social Needs    Financial resource strain: Not very hard    Food insecurity     Worry: Never true     Inability: Never true    Transportation needs     Medical: No     Non-medical: No   Tobacco Use    Smoking status: Light Tobacco Smoker     Packs/day: 0.25     Years: 20.00     Pack years: 5.00     Types: Cigarettes     Start COMPARISON: July 7, 2008 TECHNIQUE: Multiple unenhanced serial axial images of the of the maxillofacial region with both sagittal and coronal reconstructions was performed. FINDINGS The visualized basal brain shows no acute intra-axial or extra-axial findings. Both globes are intact no gross preseptal or post septal findings.: The frontal sinuses, anterior posterior ethmoids, sphenoid and maxillary sinuses are well aerated. No significant mucoperiosteal thickening or air-fluid levels. No retention cyst or. The ostiomeatal units are patent. Visualized portions the mastoids are aerated. . There are no acute fractures or focal bony abnormalities. UNREMARKABLE CT SCAN OF THE PARANASAL SINUSES All CT scans at this facility use dose modulation, iterative reconstruction, and/or weight based dosing when appropriate to reduce radiation dose to as low as reasonably achievable.        Lab Results   Component Value Date    WBC 7.8 07/27/2020    RBC 4.11 07/27/2020    HGB 13.8 07/27/2020    HCT 40.1 07/27/2020    MCV 97.6 07/27/2020    MCH 33.6 07/27/2020    MCHC 34.5 07/27/2020    RDW 12.7 07/27/2020     07/27/2020    MPV 10.2 10/07/2014     Lab Results   Component Value Date     07/27/2020    K 4.4 07/27/2020     07/27/2020    CO2 23 07/27/2020    BUN 9 07/27/2020    CREATININE 0.72 07/27/2020    GFRAA >60.0 07/27/2020    LABGLOM >60.0 07/27/2020    GLUCOSE 116 07/27/2020    PROT 7.3 07/27/2020    LABALBU 4.6 07/27/2020    CALCIUM 9.3 07/27/2020    BILITOT 0.3 07/27/2020    ALKPHOS 44 07/27/2020    AST 15 07/27/2020    ALT 9 07/27/2020     Lab Results   Component Value Date    PROTIME 13.8 12/10/2019    INR 1.0 12/10/2019     Lab Results   Component Value Date    TSH 1.610 08/07/2018     Lab Results   Component Value Date    TRIG 151 08/07/2018    HDL 61 08/07/2018    LDLCALC 28 08/07/2018     Lab Results   Component Value Date    LABAMPH Neg 11/22/2019    BARBSCNU Neg 11/22/2019    LABBENZ Neg 11/22/2019 LABMETH Neg 11/22/2019    OPIATESCREENURINE Neg 11/22/2019    PHENCYCLIDINESCREENURINE Neg 11/22/2019     No results found for: LITHIUM, DILFRTOT, VALPROATE    Assessment and Plan:      1. Intractable migraine without aura and without status migrainosus  2. Tension headache  -Established care in our office in August 2020. Today is her third visit with us. She is being seen for migraine headaches and tension type headaches. See the details listed above in HPI for visit history. Patient has been tried on multiple medications including  amitriptyline, Topamax, propranolol, sumatriptan, Zanaflex and Aimovig with no improvement. At our initial visit patient declined any preventative treatment as she was nervous of side effects. She also had initially presented with a mixed bag of various constitutional symptoms therefore MRI of the brain was obtained on 9/15/2020 showed no acute intracranial changes or areas of signal abnormalities. There is noted dysgenesis of corpus callosum that is stable since January 2019. 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 ETHAN was positive. Patient was referred to rheumatology for further work-up and evaluation. Patient presents today extremely upset. Yelling at times and slapping her hands together. She reports that she has had no relief of her headaches. He had been prescribed Aimovig at our last visit on 11/12/2020. She only did one Aimovig injection in February 2021 and did not subsequently use anymore as she reports it did not help. She never called our office to notify us of this or call us to notify us that headaches were still ongoing. I offered to increase her propranolol or make further medication adjustments or initiate new medications today and patient declined at this time. She kept stating that she has a hole in her head that she knows is there. At this time she is requesting a second opinion.   I will refer her to [facility] headache clinic for further evaluation and recommendations. No follow-ups on file.     DELMER Kay - CNP     Collaborating Physician Dr Crawford Para

## 2021-04-15 ENCOUNTER — OFFICE VISIT (OUTPATIENT)
Dept: FAMILY MEDICINE CLINIC | Age: 31
End: 2021-04-15
Payer: COMMERCIAL

## 2021-04-15 VITALS
SYSTOLIC BLOOD PRESSURE: 120 MMHG | HEIGHT: 59 IN | HEART RATE: 72 BPM | DIASTOLIC BLOOD PRESSURE: 80 MMHG | TEMPERATURE: 97.7 F | OXYGEN SATURATION: 98 % | WEIGHT: 101.5 LBS | BODY MASS INDEX: 20.46 KG/M2

## 2021-04-15 DIAGNOSIS — L23.7 POISON IVY DERMATITIS: Primary | ICD-10-CM

## 2021-04-15 DIAGNOSIS — L29.9 GENERALIZED PRURITUS: ICD-10-CM

## 2021-04-15 PROCEDURE — G8420 CALC BMI NORM PARAMETERS: HCPCS | Performed by: PHYSICIAN ASSISTANT

## 2021-04-15 PROCEDURE — G8427 DOCREV CUR MEDS BY ELIG CLIN: HCPCS | Performed by: PHYSICIAN ASSISTANT

## 2021-04-15 PROCEDURE — 99213 OFFICE O/P EST LOW 20 MIN: CPT | Performed by: PHYSICIAN ASSISTANT

## 2021-04-15 PROCEDURE — 4004F PT TOBACCO SCREEN RCVD TLK: CPT | Performed by: PHYSICIAN ASSISTANT

## 2021-04-15 RX ORDER — HYDROXYZINE HYDROCHLORIDE 25 MG/1
25 TABLET, FILM COATED ORAL EVERY 8 HOURS PRN
Qty: 30 TABLET | Refills: 0 | Status: SHIPPED | OUTPATIENT
Start: 2021-04-15 | End: 2021-04-25

## 2021-04-15 RX ORDER — PREDNISONE 20 MG/1
40 TABLET ORAL DAILY
Qty: 20 TABLET | Refills: 0 | Status: SHIPPED | OUTPATIENT
Start: 2021-04-15 | End: 2021-04-25

## 2021-04-15 RX ORDER — CLOBETASOL PROPIONATE 0.5 MG/G
OINTMENT TOPICAL
Qty: 1 TUBE | Refills: 2 | Status: SHIPPED | OUTPATIENT
Start: 2021-04-15

## 2021-04-15 ASSESSMENT — ENCOUNTER SYMPTOMS
COUGH: 0
TROUBLE SWALLOWING: 0
DIARRHEA: 0
SINUS PRESSURE: 0
BACK PAIN: 0
ABDOMINAL PAIN: 0
CHEST TIGHTNESS: 0
VOMITING: 0
SHORTNESS OF BREATH: 0

## 2021-04-15 ASSESSMENT — PATIENT HEALTH QUESTIONNAIRE - PHQ9
2. FEELING DOWN, DEPRESSED OR HOPELESS: 0
SUM OF ALL RESPONSES TO PHQ QUESTIONS 1-9: 0
SUM OF ALL RESPONSES TO PHQ9 QUESTIONS 1 & 2: 0
SUM OF ALL RESPONSES TO PHQ QUESTIONS 1-9: 0
SUM OF ALL RESPONSES TO PHQ QUESTIONS 1-9: 0
1. LITTLE INTEREST OR PLEASURE IN DOING THINGS: 0

## 2021-04-15 NOTE — PATIENT INSTRUCTIONS
Patient Education        Poison RASH[place], [place], and [place]: Care Instructions  Your Care Instructions     Poison ivy, poison oak, and poison sumac are plants that can cause a skin rash upon contact. The red, itchy rash often shows up in lines or streaks and may cause fluid-filled blisters or large, raised hives. The rash is caused by an allergic reaction to an oil in poison ivy, oak, and sumac. The rash may occur when you touch the plant or when you touch clothing, pet fur, sporting gear, gardening tools, or other objects that have come in contact with one of these plants. You cannot catch or spread the rash, even if you touch it or the blister fluid, because the plant oil will already have been absorbed or washed off the skin. The rash may seem to be spreading, but either it is still developing from earlier contact or you have touched something that still has the plant oil on it. Follow-up care is a key part of your treatment and safety. Be sure to make and go to all appointments, and call your doctor if you are having problems. It's also a good idea to know your test results and keep a list of the medicines you take. How can you care for yourself at home? · If your doctor prescribed a cream, use it as directed. If your doctor prescribed medicine, take it exactly as prescribed. Call your doctor if you think you are having a problem with your medicine. · Use cold, wet cloths to reduce itching. · Keep cool, and stay out of the sun. · Leave the rash open to the air. · Wash all clothing or other things that may have come in contact with the plant oil. · Avoid most lotions and ointments until the rash heals. Calamine lotion may help relieve symptoms of a plant rash. Use it 3 or 4 times a day. To prevent poison ivy exposure  If you know that you will be near poison ivy, oak, or sumac, you can try these options:  · Use a product designed to help prevent plant oil from getting on the skin.  These products, such as RASHAD-CHÂTILLON X Pre-Contact Skin Solution, come in lotions, sprays, or towelettes. You put the product on your skin right before you go outdoors. · If you did not use a preventive product and you have had contact with plant oil, clean it off your skin as soon as possible. Use a product such as [employer] Original Outdoor Skin Cleanser. These products can also be used to clean plant oil from clothing or tools. When should you call for help? Call your doctor now or seek immediate medical care if:    · Your rash gets worse, and you start to feel bad and have a fever, a stiff neck, nausea, and vomiting.     · You have signs of infection, such as:  ? Increased pain, swelling, warmth, or redness. ? Red streaks leading from the rash. ? Pus draining from the rash. ? A fever. Watch closely for changes in your health, and be sure to contact your doctor if:    · You have new blisters or bruises, or the rash spreads and looks like a sunburn.     · The rash gets worse, or it comes back after nearly disappearing.     · You think a medicine you are using is making your rash worse.     · Your rash does not clear up after 1 to 2 weeks of home treatment.     · You have joint aches or body aches with your rash. Where can you learn more? Go to [email or web address] org and sign in to your [employer] account. Enter Q197 in the [facility] box to learn more about \"Poison RASHAD[place], [place], and [place]: Care Instructions. \"     If you do not have an account, please click on the \"Sign Up Now\" link. Current as of: July 2, 2020               Content Version: 12.8  © 5398-9233 [employer]. Care instructions adapted under license by [facility] ([facility]). If you have questions about a medical condition or this instruction, always ask your healthcare professional. [address] any warranty or liability for your use of this information.

## 2021-04-15 NOTE — PROGRESS NOTES
Subjective:      Patient ID: Allison Guzman is a 27 y.o. female who presents today for:  Chief Complaint   Patient presents with    Other     possible poison ivy       HPI  27year old female who presents with an itchy rash to her forearm. Both her and her  have it. She was exposed several days ago.      Past Medical History:   Diagnosis Date    Anemia     Congenital anomaly of corpus callosum (HCC)     Encephalopathy     Intractable migraine without aura and without status migrainosus 2017    Kidney stones     Micrognathia     Postpartum depression 2018    Precordial pain 2019    Unspecified asthma(493.90)      Past Surgical History:   Procedure Laterality Date     SECTION      DILATION AND CURETTAGE OF UTERUS N/A 2019    SUCTION DILATATION AND CURETTAGE performed by Kina Riuz MD at [address] [place] [address], VAGINAL N/A 2020    [facility], BILATERAL SALPINGECTOMY performed by Kina Ruiz MD at [place] [address]      laparascopic sx    TONSILLECTOMY       Social History     Socioeconomic History    Marital status:      Spouse name: Not on file    Number of children: Not on file    Years of education: Not on file    Highest education level: Not on file   Occupational History    Not on file   Social Needs    Financial resource strain: Not very hard    Food insecurity     Worry: Never true     Inability: Never true    Transportation needs     Medical: No     Non-medical: No   Tobacco Use    Smoking status: Light Tobacco Smoker     Packs/day: 0.25     Years: 20.00     Pack years: 5.00     Types: Cigarettes     Start date: 1997    Smokeless tobacco: Never Used   Substance and Sexual Activity    Alcohol use: No     Alcohol/week: 0.0 standard drinks    Drug use: No    Sexual activity: Yes     Partners: Male   Lifestyle    Physical activity     Days per week: Not on file     Minutes per session: Not on file    Stress: Not on file   Relationships    Social connections     Talks on phone: Not on file     Gets together: Not on file     Attends Nondenominational service: Not on file     Active member of club or organization: Not on file     Attends meetings of clubs or organizations: Not on file     Relationship status: Not on file    Intimate partner violence     Fear of current or ex partner: Not on file     Emotionally abused: Not on file     Physically abused: Not on file     Forced sexual activity: Not on file   Other Topics Concern    Not on file   Social History Narrative    Not on file     Family History   Problem Relation Age of Onset    Asthma Mother     High Blood Pressure Mother     Asthma Father     Heart Disease Maternal Grandmother     Asthma Maternal Grandmother     Diabetes Maternal Grandmother     Kidney Disease Maternal Grandmother     Other Maternal Grandmother     Cancer Paternal Grandfather      Allergies   Allergen Reactions    Acyclovir And Related Swelling    Ibuprofen Hives    Morphine Itching    Pcn [Penicillins] Anaphylaxis and Hives    Amoxicillin Hives and Swelling     Throat swelling    Cephalexin Hives    Macrobid [Nitrofurantoin Macrocrystal] Hives    Hydrocodone-Acetaminophen Itching    Oxycodone-Acetaminophen Itching     Current Outpatient Medications   Medication Sig Dispense Refill    predniSONE (DELTASONE) 20 MG tablet Take 2 tablets by mouth daily for 10 days 20 tablet 0    clobetasol (TEMOVATE) 0.05 % ointment Apply topically 2 times daily.  1 Tube 2    hydrOXYzine (ATARAX) 25 MG tablet Take 1 tablet by mouth every 8 hours as needed for Itching 30 tablet 0    ranolazine (RANEXA) 500 MG extended release tablet TAKE 1 TABLET BY MOUTH TWICE A  tablet 1    propranolol (INDERAL LA) 60 MG extended release capsule TAKE 1 CAPSULE BY MOUTH EVERY DAY 90 capsule 3    omeprazole (PRILOSEC) 20 MG delayed release capsule Take 1 capsule by mouth every morning (before breakfast) 90 capsule 1  BD INSULIN SYRINGE U/F 31G X 5/16\" 0.5 ML MISC USE AS DIRECTED WITH SUMATRIPTAN      ondansetron (ZOFRAN) 8 MG tablet Take 1 tablet by mouth every 8 hours as needed for Nausea or Vomiting 40 tablet 0    simethicone (MYLICON) 80 MG chewable tablet Take 1 tablet by mouth 4 times daily as needed for Flatulence 180 tablet 1    nitroGLYCERIN (NITROSTAT) 0.4 MG SL tablet Place 1 tablet under the tongue every 5 minutes as needed for Chest pain 25 tablet 3    metoclopramide (REGLAN) 10 MG tablet Take 1 tablet by mouth every 6 hours as needed (N/V) 30 tablet 0     No current facility-administered medications for this visit. Review of Systems   Constitutional: Negative for activity change, appetite change, chills, fever and unexpected weight change. HENT: Negative for drooling, ear pain, nosebleeds, sinus pressure and trouble swallowing. Respiratory: Negative for cough, chest tightness and shortness of breath. Cardiovascular: Negative for chest pain and leg swelling. Gastrointestinal: Negative for abdominal pain, diarrhea and vomiting. Endocrine: Negative for polydipsia and polyphagia. Genitourinary: Negative for dysuria, flank pain and frequency. Musculoskeletal: Negative for back pain and myalgias. Skin: Positive for rash (right forearm). Negative for pallor. Neurological: Negative for syncope, weakness and headaches. Hematological: Does not bruise/bleed easily. Psychiatric/Behavioral: Negative for agitation, behavioral problems and confusion. All other systems reviewed and are negative. Objective:   /80 (Site: Left Upper Arm, Position: Sitting, Cuff Size: Medium Adult)   Pulse 72   Temp 97.7 °F (36.5 °C)   Ht 4' 11\" (1.499 m)   Wt 101 lb 8 oz (46 kg)   LMP 01/03/2020   SpO2 98%   BMI 20.50 kg/m²     Physical Exam  Vitals signs and nursing note reviewed. Constitutional:       General: She is awake. She is not in acute distress.      Appearance: Normal appearance. She is well-developed and normal weight. She is not ill-appearing, toxic-appearing or diaphoretic. Comments: No photophobia. No phonophobia. HENT:      Head: Normocephalic and atraumatic. No Walsh's sign. Right Ear: External ear normal.      Left Ear: External ear normal.      Nose: Nose normal. No congestion or rhinorrhea. Mouth/Throat:      Mouth: Mucous membranes are moist.      Pharynx: Oropharynx is clear. No oropharyngeal exudate or posterior oropharyngeal erythema. Eyes:      General: No scleral icterus. Right eye: No foreign body or discharge. Left eye: No discharge. Extraocular Movements: Extraocular movements intact. Conjunctiva/sclera: Conjunctivae normal.      Left eye: No exudate. Pupils: Pupils are equal, round, and reactive to light. Neck:      Musculoskeletal: Normal range of motion and neck supple. No neck rigidity. Vascular: No JVD. Trachea: No tracheal deviation. Comments: No meningismus. Cardiovascular:      Rate and Rhythm: Normal rate and regular rhythm. Heart sounds: Normal heart sounds. Heart sounds not distant. No murmur. No friction rub. No gallop. Pulmonary:      Effort: Pulmonary effort is normal. No respiratory distress. Breath sounds: Normal breath sounds. No stridor. No wheezing, rhonchi or rales. Chest:      Chest wall: No tenderness. Abdominal:      General: Abdomen is flat. Bowel sounds are normal. There is no distension. Palpations: Abdomen is soft. There is no mass. Tenderness: There is no abdominal tenderness. There is no right CVA tenderness, left CVA tenderness, guarding or rebound. Hernia: No hernia is present. Musculoskeletal: Normal range of motion. General: No swelling, tenderness, deformity or signs of injury. Lymphadenopathy:      Head:      Right side of head: No submental adenopathy. Left side of head: No submental adenopathy.    Skin: General: Skin is warm and dry. Capillary Refill: Capillary refill takes less than 2 seconds. Coloration: Skin is not jaundiced or pale. Findings: Rash present. No bruising, erythema or lesion. Rash is urticarial.      Comments: Right forearm   Neurological:      General: No focal deficit present. Mental Status: She is alert and oriented to person, place, and time. Mental status is at baseline. Cranial Nerves: No cranial nerve deficit. Sensory: No sensory deficit. Motor: No weakness. Coordination: Coordination normal.      Deep Tendon Reflexes: Reflexes are normal and symmetric. Psychiatric:         Mood and Affect: Mood normal.         Behavior: Behavior normal. Behavior is cooperative. Thought Content: Thought content normal.         Judgment: Judgment normal.         Assessment:       Diagnosis Orders   1. Poison ivy dermatitis  hydrOXYzine (ATARAX) 25 MG tablet   2. Generalized pruritus  predniSONE (DELTASONE) 20 MG tablet    clobetasol (TEMOVATE) 0.05 % ointment    hydrOXYzine (ATARAX) 25 MG tablet     No results found for this visit on 04/15/21. Plan:     Assessment & Plan   Patricia Tirado was seen today for other. Diagnoses and all orders for this visit:    Poison ivy dermatitis  -     hydrOXYzine (ATARAX) 25 MG tablet; Take 1 tablet by mouth every 8 hours as needed for Itching    Generalized pruritus  -     predniSONE (DELTASONE) 20 MG tablet; Take 2 tablets by mouth daily for 10 days  -     clobetasol (TEMOVATE) 0.05 % ointment; Apply topically 2 times daily. -     hydrOXYzine (ATARAX) 25 MG tablet; Take 1 tablet by mouth every 8 hours as needed for Itching      No orders of the defined types were placed in this encounter.     Orders Placed This Encounter   Medications    predniSONE (DELTASONE) 20 MG tablet     Sig: Take 2 tablets by mouth daily for 10 days     Dispense:  20 tablet     Refill:  0    clobetasol (TEMOVATE) 0.05 % ointment     Sig: Apply topically 2 times daily. Dispense:  1 Tube     Refill:  2    hydrOXYzine (ATARAX) 25 MG tablet     Sig: Take 1 tablet by mouth every 8 hours as needed for Itching     Dispense:  30 tablet     Refill:  0     There are no discontinued medications. Return if symptoms worsen or fail to improve. Reviewed with the patient/family: current clinical status & medications. Side effects of the medication prescribed today, as well as treatment plan/rationale and result expectations have been discussed with the patient/family who expresses understanding. Patient will be discharged home in stable condition. Follow up with PCP to evaluate treatment results or return if symptoms worsen or fail to improve. Discussed signs and symptoms which require immediate follow-up in ED/call to 911. Understanding verbalized. I have reviewed the patient's medical history in detail and updated the computerized patient record.     BRIDGETTE Kan

## 2021-05-05 ENCOUNTER — VIRTUAL VISIT (OUTPATIENT)
Dept: FAMILY MEDICINE CLINIC | Age: 31
End: 2021-05-05
Payer: COMMERCIAL

## 2021-05-05 ENCOUNTER — TELEPHONE (OUTPATIENT)
Dept: FAMILY MEDICINE CLINIC | Age: 31
End: 2021-05-05

## 2021-05-05 DIAGNOSIS — R07.2 PRECORDIAL PAIN: ICD-10-CM

## 2021-05-05 DIAGNOSIS — G43.019 INTRACTABLE MIGRAINE WITHOUT AURA AND WITHOUT STATUS MIGRAINOSUS: ICD-10-CM

## 2021-05-05 DIAGNOSIS — K21.9 GASTROESOPHAGEAL REFLUX DISEASE WITHOUT ESOPHAGITIS: ICD-10-CM

## 2021-05-05 DIAGNOSIS — L23.7 POISON IVY DERMATITIS: Primary | ICD-10-CM

## 2021-05-05 PROCEDURE — 99213 OFFICE O/P EST LOW 20 MIN: CPT | Performed by: FAMILY MEDICINE

## 2021-05-05 PROCEDURE — G8427 DOCREV CUR MEDS BY ELIG CLIN: HCPCS | Performed by: FAMILY MEDICINE

## 2021-05-05 RX ORDER — PROPRANOLOL HCL 60 MG
CAPSULE, EXTENDED RELEASE 24HR ORAL
Qty: 90 CAPSULE | Refills: 3 | Status: SHIPPED | OUTPATIENT
Start: 2021-05-05 | End: 2022-02-04 | Stop reason: SDUPTHER

## 2021-05-05 RX ORDER — OMEPRAZOLE 20 MG/1
20 CAPSULE, DELAYED RELEASE ORAL
Qty: 90 CAPSULE | Refills: 1 | Status: SHIPPED | OUTPATIENT
Start: 2021-05-05

## 2021-05-05 RX ORDER — PREDNISONE 1 MG/1
TABLET ORAL
Qty: 30 TABLET | Refills: 0 | Status: SHIPPED | OUTPATIENT
Start: 2021-05-05 | End: 2021-08-19

## 2021-05-05 RX ORDER — BETAMETHASONE DIPROPIONATE 0.05 %
OINTMENT (GRAM) TOPICAL
Qty: 50 G | Refills: 0 | Status: SHIPPED | OUTPATIENT
Start: 2021-05-05

## 2021-05-05 RX ORDER — RANOLAZINE 500 MG/1
TABLET, EXTENDED RELEASE ORAL
Qty: 180 TABLET | Refills: 1 | Status: SHIPPED | OUTPATIENT
Start: 2021-05-05 | End: 2022-02-04 | Stop reason: SDUPTHER

## 2021-05-05 RX ORDER — ONDANSETRON HYDROCHLORIDE 8 MG/1
8 TABLET, FILM COATED ORAL EVERY 8 HOURS PRN
Qty: 40 TABLET | Refills: 0 | Status: SHIPPED | OUTPATIENT
Start: 2021-05-05 | End: 2021-07-22 | Stop reason: SDUPTHER

## 2021-05-05 NOTE — PROGRESS NOTES
Patient: Russ Vásquez (: 1990) is a 27 y.o. female,Established patient, here for evaluation of the following chief complaint(s):  Chief Complaint   Patient presents with   [address]     She complains of having poison ivy on her arms and hands, itchy       Date: 21    Allergies   Allergen Reactions    Acyclovir And Related Swelling    Ibuprofen Hives    Morphine Itching    Pcn [Penicillins] Anaphylaxis and Hives    Amoxicillin Hives and Swelling     Throat swelling    Cephalexin Hives    Macrobid [Nitrofurantoin Macrocrystal] Hives    Hydrocodone-Acetaminophen Itching    Oxycodone-Acetaminophen Itching       There were no vitals filed for this visit. There is no height or weight on file to calculate BMI. PHQ Scores 4/15/2021 2020 2018   PHQ2 Score 0 0 0   PHQ9 Score 0 0 0     Interpretation of Total Score Depression Severity: 1-4 = Minimal depression, 5-9 = Mild depression, 10-14 = Moderate depression, 15-19 = Moderately severe depression, 20-27 = Severe depression    HPI  She was doing yard work when she realized she had gotten into poison ivy. She has it on her hands and her legs and her arms. Is very itchy treated it usually takes a very long time to go away. She has no fever chills cough nausea or vomiting and she did need some of her regular medication refilled. Review of Systems    Constitutional: Negative for fatigue, fever and sweats. HEENT: Negative for eye discharge and vision loss. Negative for ear drainage, hearing loss and nasal drainage. Respiratory: Negative for cough, dyspnea and wheezing. Cardiovascular:  Negative for chest pain, claudication and irregular heartbeat/palpitations. Gastrointestinal: Negative for abdominal pain, nausea, constipation and diarrhea. Genitourinary: Negative for dysuria, patient had a hysterectomy. Metabolic/Endocrine: Negative for cold intolerance, heat intolerance, polydipsia and polyphagia.  No unintended weight loss or weight gain. Neuro/Psychiatric: Negative for gait disturbance. Negative for psychiatric symptoms. Dermatologic: Negative for pruritus and positive for rash. Musculoskeletal: Negative for bone/joint symptoms. No numbness or tingling. No loss of function. Hematology: Negative for bleeding and easy bruising. Immunology:  Negative for environmental allergies and food allergies. Patient: Maria De Jesus Germain (: 1990) is a 27 y.o. female,Established patient, here for evaluation of the following chief complaint(s):  Chief Complaint   Patient presents with   [facility]     She complains of having poison ivy on her arms and hands, itchy       YOB: 1990    Date: 21    Allergies   Allergen Reactions    Acyclovir And Related Swelling    Ibuprofen Hives    Morphine Itching    Pcn [Penicillins] Anaphylaxis and Hives    Amoxicillin Hives and Swelling     Throat swelling    Cephalexin Hives    Macrobid [Nitrofurantoin Macrocrystal] Hives    Hydrocodone-Acetaminophen Itching    Oxycodone-Acetaminophen Itching       There were no vitals filed for this visit. There is no height or weight on file to calculate BMI. On this date 21 I have spent 21 minutes reviewing previous notes, test results  discussion with the patient the diagnosis and importance of compliance with the treatment plan. Maria De Jesus Germain (: 1990) is a 27 y.o. female, being evaluated by a Virtual Visit (video visit) encounter to address concerns as mentioned above. A caregiver was present when appropriate. Due to this being a TeleHealth encounter (During [facility]- public [facility] emergency), evaluation of the following organ systems was limited: Vitals/Constitutional/EENT/Resp/CV/GI//MS/Neuro/Skin/Heme-Lymph-Imm.   Pursuant to the emergency declaration under the [facility], [facility] authority and the [facility] Act, this Virtual Visit was conducted with patient's (and/or legal guardian's) consent, to reduce the patient's risk of exposure to COVID-19 and provide necessary medical care. The patient (and/or legal guardian) has also been advised to contact this office for worsening conditions or problems, and seek emergency medical treatment and/or call 911 if deemed necessary. Physical Exam    Patient seen on video and is alert, comfortable in no acute distress. The patient is comfortable and appropriate and oriented. Able to speak without difficulty. I was able to see an erythematous rash on her hands forearms and legs during the discussion minimal bulla formation no spreading redness no swelling    Assessment:   Diagnosis Orders   1. Poison ivy dermatitis  betamethasone dipropionate (DIPROLENE) 0.05 % ointment    predniSONE (DELTASONE) 5 MG tablet   2. Intractable migraine without aura and without status migrainosus  propranolol (INDERAL LA) 60 MG extended release capsule    ondansetron (ZOFRAN) 8 MG tablet   3. Gastroesophageal reflux disease without esophagitis  omeprazole (PRILOSEC) 20 MG delayed release capsule   4. Precordial pain  ranolazine (RANEXA) 500 MG extended release tablet            Plan:  Current Outpatient Medications   Medication Sig Dispense Refill    propranolol (INDERAL LA) 60 MG extended release capsule TAKE 1 CAPSULE BY MOUTH EVERY DAY 90 capsule 3    omeprazole (PRILOSEC) 20 MG delayed release capsule Take 1 capsule by mouth every morning (before breakfast) 90 capsule 1    ranolazine (RANEXA) 500 MG extended release tablet TAKE 1 TABLET BY MOUTH TWICE A  tablet 1    ondansetron (ZOFRAN) 8 MG tablet Take 1 tablet by mouth every 8 hours as needed for Nausea or Vomiting 40 tablet 0    betamethasone dipropionate (DIPROLENE) 0.05 % ointment Apply topically daily.  50 g 0    predniSONE (DELTASONE) 5 MG tablet 4 PO each day for three days, 3 PO each day for three days, 2 PO each day for three days ,1 PO each day until gone 30 tablet 0    clobetasol (TEMOVATE) 0.05 % ointment Apply topically 2 times daily. 1 Tube 2    BD INSULIN SYRINGE U/F 31G X \" 0.5 ML MISC USE AS DIRECTED WITH SUMATRIPTAN      simethicone (MYLICON) 80 MG chewable tablet Take 1 tablet by mouth 4 times daily as needed for Flatulence 180 tablet 1    nitroGLYCERIN (NITROSTAT) 0.4 MG SL tablet Place 1 tablet under the tongue every 5 minutes as needed for Chest pain 25 tablet 3    metoclopramide (REGLAN) 10 MG tablet Take 1 tablet by mouth every 6 hours as needed (N/V) 30 tablet 0     No current facility-administered medications for this visit. No orders of the defined types were placed in this encounter. Orders Placed This Encounter   Medications    propranolol (INDERAL LA) 60 MG extended release capsule     Sig: TAKE 1 CAPSULE BY MOUTH EVERY DAY     Dispense:  90 capsule     Refill:  3    omeprazole (PRILOSEC) 20 MG delayed release capsule     Sig: Take 1 capsule by mouth every morning (before breakfast)     Dispense:  90 capsule     Refill:  1    ranolazine (RANEXA) 500 MG extended release tablet     Sig: TAKE 1 TABLET BY MOUTH TWICE A DAY     Dispense:  180 tablet     Refill:  1    ondansetron (ZOFRAN) 8 MG tablet     Sig: Take 1 tablet by mouth every 8 hours as needed for Nausea or Vomiting     Dispense:  40 tablet     Refill:  0    betamethasone dipropionate (DIPROLENE) 0.05 % ointment     Sig: Apply topically daily. Dispense:  50 g     Refill:  0    predniSONE (DELTASONE) 5 MG tablet     Si PO each day for three days, 3 PO each day for three days, 2 PO each day for three days ,1 PO each day until gone     Dispense:  30 tablet     Refill:  0             Return if symptoms worsen or fail to improve. Services were provided through a video synchronous discussion virtually to substitute for in-person clinic visit.  Patient and provider were located at their individual homes or non office

## 2021-05-05 NOTE — TELEPHONE ENCOUNTER
We received a fax from [facility] and Betamethasone oint is not covered by insurance.  They do not give us any alternatives

## 2021-05-06 RX ORDER — FLUOCINOLONE ACETONIDE 0.25 MG/G
CREAM TOPICAL
Qty: 60 G | Refills: 0 | Status: SHIPPED | OUTPATIENT
Start: 2021-05-06

## 2021-05-06 NOTE — TELEPHONE ENCOUNTER
Allergy medications such as over-the-counter generic Zyrtec can help with itching or Benadryl. The prednisone should also help.

## 2021-05-18 NOTE — PROGRESS NOTES
Ham Gonzalez MASSIEL 1975     Office/Outpatient Visit    Visit Date: Wed, Sep 4, 2019 10:28 am    Provider: Mariah Gonzalez N.P. (Assistant: Jaky Vasquez MA)    Location: [facility]        Electronically signed by Mariah Gonzalez N.P. on  2019 11:05:41 AM                             SUBJECTIVE:        CC:     Mrs. Gonzalez is a 43 year old White female.  Patient presents today for follow up visit;         HPI: Ham is here to follow up on her anxiety with depression. Symptoms include anxious mood, crying spells, fatigue and sadness.  Ham restarted her Citalopram 2 months ago. Has helped symptoms some. Suicidal thoughts improved. Has had some stressors at work and is looking for a new job.             PHQ-9 Depression Screening: Completed form scanned and in chart; Total Score 8 Alcohol Consumption Screening: Completed form scanned and in chart; Total Score 1     ROS:     CONSTITUTIONAL:  Negative for chills and fever.      EYES:  Negative for eye drainage and eye pain.      E/N/T:  Negative for ear pain and sore throat.      CARDIOVASCULAR:  Negative for chest pain and palpitations.      RESPIRATORY:  Negative for dyspnea and frequent wheezing.      NEUROLOGICAL:  Negative for dizziness and headaches.      PSYCHIATRIC:  Positive for anxiety and feelings of stress.   Negative for suicidal thoughts.          PMH/FMH/SH:     Last Reviewed on 2019 10:47 AM by Mariah Gonzalez    Past Medical History: chronic insomnia    h/o anxiety/depression    allergies    GERD    chronic rash     miscarriage    endometriosis                 PAST MEDICAL HISTORY             GYNECOLOGICAL HISTORY:     miscarriage 2         PREVENTIVE HEALTH MAINTENANCE             MAMMOGRAM: Done within last 2 years and results in are chart was last done 18 with normal results Dr. Newsome- scheduled 19     PAP SMEAR: was last done 3/2019 with normal results         Surgical History:         EGD ;    abdominal  laproscopies for endometriosis;    D& C X 2 for miscarriages;         Family History:         Positive for Myocardial Infarction ( mother ).      Positive for Breast Cancer ( mat. GM ) and Ovarian Cancer ( mat. GM ).          Social History:     Occupation: [employer];     Marital Status:  (x 1)     Children: 3 children         Tobacco/Alcohol/Supplements:     Last Reviewed on 9/04/2019 10:31 AM by Jaky Vasquez    Tobacco: She has never smoked.          Alcohol: Frequency: Socially         Substance Abuse History:     Last Reviewed on 11/16/2016 04:49 PM by Nathalia Gregory        Mental Health History:     Last Reviewed on 11/16/2016 04:49 PM by Nathalia Gregory        Communicable Diseases (eg STDs):     Last Reviewed on 11/16/2016 04:49 PM by Nathalia Gregory            Current Problems:     Last Reviewed on 11/16/2016 04:49 PM by Nathalia Gregory    Syphilis, unspecified     Anxiety with depression     Fatigue     Leukocytopenia, unspecified     Endometriosis, site unspecified     Diffuse arthralgia     Atopic dermatitis, other     GERD     Allergies     Chronic insomnia     Generalized anxiety disorder     Screening for depression     Flank pain         Immunizations:     Fluzone Quadrivalent (3+ years) 10/1/2018     Influenza Virus Vaccine, unspecified formulation 10/1/2017         Allergies:     Last Reviewed on 9/04/2019 10:30 AM by Jaky Vasquez    Penicillins:    Sulfonamides:    Morphine Sulfate:        Current Medications:     Last Reviewed on 9/04/2019 10:30 AM by Jaky Vasquez    Lo Loestrin Fe Biphasic Tablet Take 1 tablet(s) by mouth daily as directed.     Citalopram Hydrobromide 10mg Tablet 1 tab daily     folic acid OTC daily     Naproxen 500mg Tablet 1 tab bid     Cetirizine HCl 10mg Tablet 1 tab daily     Multivitamin/Mineral Supplement         OBJECTIVE:        Vitals:         Current: 9/4/2019 10:32:29 AM    Ht:  5 ft, 5 in;  Wt: 140.4 lbs;  BMI: 23.4    T: 98.2 F (oral);  trimester    4. Previous  delivery affecting pregnancy    5. Intractable migraine without aura and without status migrainosus        Plan:   Test of cure for chlamydia - negative   Ultrasound for anatomy - wnl . Patient encouraged to take prenatal vitamins  Magnesium oxide for migraines added - helps \" take th edge off\" , according to patient   Flexeril for back pain - improves symptoms. Referral for chronic diarrhea - pending . ECG and cardiac enzymes for , fluttering sensation chest - wnl . Orders Placed This Encounter   Procedures    Urine Culture     Standing Status:   Future     Standing Expiration Date:   2019     Order Specific Question:   Specify (ex-cath, midstream, cysto, etc)? Answer:   midstream    Urinalysis     Standing Status:   Future     Standing Expiration Date:   2019    POCT Urinalysis No Micro (Auto)      Testing Schedule     1) The patient will begin twice weekly fetal non stress testing at 32 weeks as well as weekly biophysical profiles. Fetal kick counts every 8 hours will begin at 28 weeks. 2) Growth ultrasounds will begin at 24 weeks     Indication : increased amniotic fluid, maternal inadequate weight gain during pregnancy      Plan:   Jesus Mathis MD  Return in 1 weeks.  BP: 116/80 mm Hg (right arm, sitting);  P: 71 bpm (right arm (BP Cuff), sitting);  sCr: 0.75 mg/dL;  GFR: 93.03        Exams:     PHYSICAL EXAM:     GENERAL: well developed, well nourished;  no apparent distress;     NECK: range of motion is normal; trachea is midline;     RESPIRATORY: normal respiratory rate and pattern with no distress; normal breath sounds with no rales, rhonchi, wheezes or rubs;     CARDIOVASCULAR: normal rate; rhythm is regular;     MUSCULOSKELETAL: normal gait;     NEUROLOGIC: mental status: alert and oriented x 3; GROSSLY INTACT     PSYCHIATRIC: appropriate affect and demeanor;         ASSESSMENT           300.4   F41.3  Anxiety with depression              DDx:     V79.0   Z13.31  Screening for depression              DDx:         ORDERS:         Meds Prescribed:       Refill of: Citalopram Hydrobromide 20mg Tablet 1 pill daily  #30 (Thirty) tablet(s) Refills: 2         Lab Orders:       APPTO  Appointment need  (In-House)           Other Orders:         Depression screen positive and follow up plan documented  (In-House)           Negative EtOH screen  (In-House)                   PLAN:          Anxiety with depression Will increase Citalopram to 20 mg daily. Seek care immediately for worsening symptoms such as increased suicidal ideation. Declines counseling.         FOLLOW-UP: Schedule a follow-up visit in 3 months.            Prescriptions:       Refill of: Citalopram Hydrobromide 20mg Tablet 1 pill daily  #30 (Thirty) tablet(s) Refills: 2           Orders:       APPTO  Appointment need  (In-House)            Screening for depression     MIPS PHQ-9 Depression Screening: Completed form scanned and in chart; Total Score 8 Positive Depression Screen: Suicide Risk Assessment completed--denies suicidal/homicidal ideation; Pharmacologic intervention initiated/modified Negative alcohol screen           Orders:         Depression screen positive and follow up plan documented   (In-House)           Negative EtOH screen  (In-House)               Patient Recommendations:        For  Anxiety with depression:     Schedule a follow-up visit in 3 months.              CHARGE CAPTURE           **Please note: ICD descriptions below are intended for billing purposes only and may not represent clinical diagnoses**        Primary Diagnosis:         300.4 Anxiety with depression            F41.3    Other mixed anxiety disorders              Orders:          75755   Office/outpatient visit; established patient, level 3  (In-House)             APPTO   Appointment need  (In-House)           V79.0 Screening for depression            Z13.31    Encounter for screening for depression              Orders:             Depression screen positive and follow up plan documented  (In-House)                Negative EtOH screen  (In-House)

## 2021-07-22 ENCOUNTER — VIRTUAL VISIT (OUTPATIENT)
Dept: FAMILY MEDICINE CLINIC | Age: 31
End: 2021-07-22
Payer: COMMERCIAL

## 2021-07-22 ENCOUNTER — TELEPHONE (OUTPATIENT)
Dept: FAMILY MEDICINE CLINIC | Age: 31
End: 2021-07-22

## 2021-07-22 DIAGNOSIS — G43.019 INTRACTABLE MIGRAINE WITHOUT AURA AND WITHOUT STATUS MIGRAINOSUS: Primary | ICD-10-CM

## 2021-07-22 PROCEDURE — G8427 DOCREV CUR MEDS BY ELIG CLIN: HCPCS | Performed by: INTERNAL MEDICINE

## 2021-07-22 PROCEDURE — 99213 OFFICE O/P EST LOW 20 MIN: CPT | Performed by: INTERNAL MEDICINE

## 2021-07-22 PROCEDURE — 4004F PT TOBACCO SCREEN RCVD TLK: CPT | Performed by: INTERNAL MEDICINE

## 2021-07-22 PROCEDURE — G8420 CALC BMI NORM PARAMETERS: HCPCS | Performed by: INTERNAL MEDICINE

## 2021-07-22 RX ORDER — RIMEGEPANT SULFATE 75 MG/75MG
TABLET, ORALLY DISINTEGRATING ORAL
Qty: 30 TABLET | COMMUNITY
Start: 2021-07-22

## 2021-07-22 RX ORDER — ONDANSETRON HYDROCHLORIDE 8 MG/1
8 TABLET, FILM COATED ORAL EVERY 8 HOURS PRN
Qty: 40 TABLET | Refills: 0 | Status: SHIPPED | OUTPATIENT
Start: 2021-07-22

## 2021-07-22 ASSESSMENT — ENCOUNTER SYMPTOMS
EYE ITCHING: 0
VOICE CHANGE: 0
COUGH: 0
EYE REDNESS: 0
PHOTOPHOBIA: 0
TROUBLE SWALLOWING: 0
BLOOD IN STOOL: 0
RHINORRHEA: 0
EYE DISCHARGE: 0
COLOR CHANGE: 0
NAUSEA: 0
SINUS PAIN: 0
SHORTNESS OF BREATH: 0
FACIAL SWELLING: 0
SINUS PRESSURE: 0
CHEST TIGHTNESS: 0
APNEA: 0
RECTAL PAIN: 0
ABDOMINAL PAIN: 0
CONSTIPATION: 0
EYE PAIN: 0
DIARRHEA: 0
WHEEZING: 0
VOMITING: 0
BACK PAIN: 0
SORE THROAT: 0
ABDOMINAL DISTENTION: 0

## 2021-07-22 NOTE — TELEPHONE ENCOUNTER
----- Message from Rosette Tripathi sent at 7/22/2021 12:04 PM EDT -----  Subject: Referral Request    QUESTIONS   Reason for referral request? Specialist in [facility], [address] neurology Phone   #[phone] & #[phone]. Patient was told that office would send   them the referral if she gave them the number. Has the physician seen you for this condition before? No   Preferred Specialist (if applicable)? Do you already have an appointment scheduled? No  Additional Information for Provider?   ---------------------------------------------------------------------------  --------------  CALL BACK INFO  What is the best way for the office to contact you? OK to leave message on   voicemail  Preferred Call Back Phone Number?  [phone]

## 2021-07-22 NOTE — PROGRESS NOTES
Subjective:      Patient ID: Gutierrez Medeiros is a 27 y.o. female Established patient, here for evaluation of the following chief complaint(s):  Chief Complaint   Patient presents with    Migraine     patients states shes been having migraines since she was 8years old    Nausea       HPI  Migraine headaches: persistent non-responsive to standard of care. At present he denies polyuria,  Polydipsia, constitutional, sinus, visual, cardiopulmonary, urologic, gastrointestinal, immunologic/hematologic, musculoskeletal, neurologic,dermatologic, or psychiatric complaints. Review of Systems   Constitutional: Negative for chills, diaphoresis, fatigue and fever. HENT: Negative for congestion, dental problem, drooling, ear discharge, ear pain, facial swelling, hearing loss, mouth sores, nosebleeds, postnasal drip, rhinorrhea, sinus pressure, sinus pain, sneezing, sore throat, tinnitus, trouble swallowing and voice change. Eyes: Negative for photophobia, pain, discharge, redness, itching and visual disturbance. Respiratory: Negative for apnea, cough, chest tightness, shortness of breath and wheezing. Cardiovascular: Negative for chest pain, palpitations and leg swelling. Gastrointestinal: Negative for abdominal distention, abdominal pain, blood in stool, constipation, diarrhea, nausea, rectal pain and vomiting. Endocrine: Negative for cold intolerance, heat intolerance, polydipsia, polyphagia and polyuria. Genitourinary: Negative for decreased urine volume, difficulty urinating, dysuria, flank pain, frequency, genital sores, hematuria and urgency. Musculoskeletal: Negative for arthralgias, back pain, gait problem, joint swelling, myalgias, neck pain and neck stiffness. Skin: Negative for color change, rash and wound. Allergic/Immunologic: Negative for environmental allergies and food allergies.    Neurological: Negative for dizziness, tremors, seizures, syncope, facial asymmetry, speech this office for worsening conditions or problems, and seek emergency medical treatment and/or call 911 if deemed necessary. Services were provided through a video synchronous discussion virtually to substitute for in-person clinic visit. Type of encounter was _x_ telephone encounter __ MyChart ___Facetime    Patient was located at their home. Provider was located at their ___ home or        ____ office. --Kevin Almonte MD on 7/22/2021 at 11:30 AM    An electronic signature was used to authenticate this note. Please note, this report has been partially produced using speech recognition software  and may cause  and /or contain errors related to that system including grammar, punctuation and spelling as well as words and phrases that may seem inappropriate. If there are questions or concerns please feel free to contact me to clarify.

## 2021-07-26 NOTE — TELEPHONE ENCOUNTER
Yumiko Carrillo did you fax the referral to the [facility], [facility] clinic. It looks like you closed the referral, but I did not see anything in the notes part where you faxed it.

## 2021-08-03 NOTE — TELEPHONE ENCOUNTER
Yes. Patient could not be seen in [facility] because that physician only takes patients from [facility]. She was told and said she would see someone else so I sent it to Clinic.

## 2021-08-19 ENCOUNTER — VIRTUAL VISIT (OUTPATIENT)
Dept: FAMILY MEDICINE CLINIC | Age: 31
End: 2021-08-19
Payer: COMMERCIAL

## 2021-08-19 DIAGNOSIS — J06.9 URI WITH COUGH AND CONGESTION: ICD-10-CM

## 2021-08-19 DIAGNOSIS — J40 BRONCHITIS: Primary | ICD-10-CM

## 2021-08-19 PROCEDURE — 99441 PR PHYS/QHP TELEPHONE EVALUATION 5-10 MIN: CPT | Performed by: NURSE PRACTITIONER

## 2021-08-19 RX ORDER — ALBUTEROL SULFATE 1.25 MG/3ML
1 SOLUTION RESPIRATORY (INHALATION) EVERY 4 HOURS PRN
Qty: 120 VIAL | Refills: 0 | Status: SHIPPED | OUTPATIENT
Start: 2021-08-19

## 2021-08-19 RX ORDER — BROMPHENIRAMINE MALEATE, PSEUDOEPHEDRINE HYDROCHLORIDE, AND DEXTROMETHORPHAN HYDROBROMIDE 2; 30; 10 MG/5ML; MG/5ML; MG/5ML
5 SYRUP ORAL 4 TIMES DAILY PRN
Qty: 180 ML | Refills: 0 | Status: SHIPPED | OUTPATIENT
Start: 2021-08-19

## 2021-08-19 RX ORDER — PREDNISONE 20 MG/1
40 TABLET ORAL DAILY
Qty: 10 TABLET | Refills: 0 | Status: SHIPPED | OUTPATIENT
Start: 2021-08-19 | End: 2021-08-24

## 2021-08-19 RX ORDER — ALBUTEROL SULFATE 90 UG/1
2 AEROSOL, METERED RESPIRATORY (INHALATION) 2 TIMES DAILY
Qty: 2 INHALER | Refills: 0 | Status: SHIPPED | OUTPATIENT
Start: 2021-08-19

## 2021-08-19 RX ORDER — AZITHROMYCIN 250 MG/1
TABLET, FILM COATED ORAL
Qty: 6 TABLET | Refills: 0 | Status: SHIPPED | OUTPATIENT
Start: 2021-08-19 | End: 2021-08-29

## 2021-08-19 ASSESSMENT — ENCOUNTER SYMPTOMS
WHEEZING: 0
SORE THROAT: 0
COUGH: 1
ABDOMINAL DISTENTION: 0
ABDOMINAL PAIN: 0
CHEST TIGHTNESS: 1
BACK PAIN: 0
VOMITING: 0
SINUS PAIN: 0
CONSTIPATION: 0
RHINORRHEA: 0
COLOR CHANGE: 0
TROUBLE SWALLOWING: 0
DIARRHEA: 0
SINUS PRESSURE: 1
NAUSEA: 0
SHORTNESS OF BREATH: 1

## 2021-08-19 NOTE — PATIENT INSTRUCTIONS
Patient Education        Bronchitis: Care Instructions  Your Care Instructions     Bronchitis is inflammation of the bronchial tubes, which carry air to the lungs. The tubes swell and produce mucus, or phlegm. The mucus and inflamed bronchial tubes make you cough. You may have trouble breathing. Most cases of bronchitis are caused by viruses like those that cause colds. Antibiotics usually do not help and they may be harmful. Bronchitis usually develops rapidly and lasts about 2 to 3 weeks in otherwise healthy people. Follow-up care is a key part of your treatment and safety. Be sure to make and go to all appointments, and call your doctor if you are having problems. It's also a good idea to know your test results and keep a list of the medicines you take. How can you care for yourself at home? · Take all medicines exactly as prescribed. Call your doctor if you think you are having a problem with your medicine. · Get some extra rest.  · Take an over-the-counter pain medicine, such as acetaminophen (Tylenol), ibuprofen (Advil, Motrin), or naproxen (Aleve) to reduce fever and relieve body aches. Read and follow all instructions on the label. · Do not take two or more pain medicines at the same time unless the doctor told you to. Many pain medicines have acetaminophen, which is Tylenol. Too much acetaminophen (Tylenol) can be harmful. · Take an over-the-counter cough medicine that contains dextromethorphan to help quiet a dry, hacking cough so that you can sleep. Avoid cough medicines that have more than one active ingredient. Read and follow all instructions on the label. · Breathe moist air from a humidifier, hot shower, or sink filled with hot water. The heat and moisture will thin mucus so you can cough it out. · Do not smoke. Smoking can make bronchitis worse. If you need help quitting, talk to your doctor about stop-smoking programs and medicines.  These can increase your chances of quitting for good.  When should you call for help? Call 911 anytime you think you may need emergency care. For example, call if:    · You have severe trouble breathing. Call your doctor now or seek immediate medical care if:    · You have new or worse trouble breathing.     · You cough up dark brown or bloody mucus (sputum).     · You have a new or higher fever.     · You have a new rash. Watch closely for changes in your health, and be sure to contact your doctor if:    · You cough more deeply or more often, especially if you notice more mucus or a change in the color of your mucus.     · You are not getting better as expected. Where can you learn more? Go to https://[employer]. org and sign in to your [employer] account. Enter H333 in the [employer] box to learn more about \"Bronchitis: Care Instructions. \"     If you do not have an account, please click on the \"Sign Up Now\" link. Current as of: October 26, 2020               Content Version: 12.9  © 2006-2021 [employer]. Care instructions adapted under license by [facility] ([facility]). If you have questions about a medical condition or this instruction, always ask your healthcare professional. [place] [address] any warranty or liability for your use of this information.

## 2021-08-19 NOTE — PROGRESS NOTES
2021    TELEHEALTH EVALUATION -- Audio/Visual (During GAEDQ-03 public health emergency)    Due to [place] 19 outbreak, patient's office visit was converted to a virtual visit. Patient was contacted and agreed to proceed with a virtual visit via Telephone Visit  The risks and benefits of converting to a virtual visit were discussed in light of the current infectious disease epidemic. Patient also understood that insurance coverage and co-pays are up to their individual insurance plans. HPI:    Guero Don (:  1990) has requested an audio/video evaluation for the following concern(s):    Started this morning, cough chest congestion aching with the cough, some mild sinus congestion. No fever. Has been vaccinated for Covid-19. Review of Systems   Constitutional: Negative for activity change, appetite change, chills, diaphoresis, fatigue and fever. HENT: Positive for congestion, postnasal drip and sinus pressure. Negative for ear pain, rhinorrhea, sinus pain, sore throat and trouble swallowing. Eyes: Negative for visual disturbance. Respiratory: Positive for cough, chest tightness and shortness of breath. Negative for wheezing. Cardiovascular: Negative for chest pain and palpitations. Gastrointestinal: Negative for abdominal distention, abdominal pain, constipation, diarrhea, nausea and vomiting. Genitourinary: Negative for difficulty urinating, dysuria, enuresis, flank pain, frequency and urgency. Musculoskeletal: Negative for arthralgias, back pain, myalgias, neck pain and neck stiffness. Skin: Negative for color change and rash. Neurological: Negative for dizziness, tremors, seizures, syncope, speech difficulty, weakness, light-headedness, numbness and headaches. Prior to Visit Medications    Medication Sig Taking?  Authorizing Provider   albuterol sulfate  (90 Base) MCG/ACT inhaler Inhale 2 puffs into the lungs 2 times daily Yes DELMER Thurston - CNP   albuterol (ACCUNEB) 1.25 MG/3ML nebulizer solution Inhale 3 mLs into the lungs every 4 hours as needed for Wheezing Yes DELMER Walton CNP   azithromycin (ZITHROMAX) 250 MG tablet Take 2 tabs DAY 1 then 1 tab DAYS 2-5 Yes DELMER Walton CNP   brompheniramine-pseudoephedrine-DM 2-30-10 MG/5ML syrup Take 5 mLs by mouth 4 times daily as needed for Congestion or Cough Yes DELMER Walton CNP   predniSONE (DELTASONE) 20 MG tablet Take 2 tablets by mouth daily for 5 days Yes DEMLER Walton CNP   topiramate (TOPAMAX) 50 MG tablet TAKE 1 TABLET BY MOUTH TWICE A DAY Yes Nasra Hyman PA-C   ondansetron (ZOFRAN) 8 MG tablet Take 1 tablet by mouth every 8 hours as needed for Nausea or Vomiting Yes Sandhya Holley MD   Rimegepant Sulfate (NURTEC) 75 MG TBDP Take 1 tablet orally once as needed for migraine headache. Yes Sandhya Holley MD   fluocinolone (SYNALAR) 0.025 % cream Apply topically 2 times daily. Yes Kristie Figueroa MD   propranolol (INDERAL LA) 60 MG extended release capsule TAKE 1 CAPSULE BY MOUTH EVERY DAY Yes Kristie Figueroa MD   omeprazole (PRILOSEC) 20 MG delayed release capsule Take 1 capsule by mouth every morning (before breakfast) Yes Kristie Figueroa MD   ranolazine (RANEXA) 500 MG extended release tablet TAKE 1 TABLET BY MOUTH TWICE A DAY Yes Kristie Figueroa MD   betamethasone dipropionate (DIPROLENE) 0.05 % ointment Apply topically daily. Yes Kristie Olivasday, MD   clobetasol (TEMOVATE) 0.05 % ointment Apply topically 2 times daily.  Yes BRIDGETTE Saeed   BD INSULIN SYRINGE U/F 31G X 5/16\" 0.5 ML MISC USE AS DIRECTED WITH SUMATRIPTAN Yes Historical Provider, MD   simethicone (MYLICON) 80 MG chewable tablet Take 1 tablet by mouth 4 times daily as needed for Flatulence Yes Ben Ferguson MD   nitroGLYCERIN (NITROSTAT) 0.4 MG SL tablet Place 1 tablet under the tongue every 5 minutes as needed for Chest pain Yes Lino Noel, DO   metoclopramide (REGLAN) 10 MG tablet Take 1 tablet by mouth every 6 hours as needed (N/V) Yes Yovani Yeh MD       Social History     Tobacco Use    Smoking status: Light Tobacco Smoker     Packs/day: 0.25     Years: 20.00     Pack years: 5.00     Types: Cigarettes     Start date: 1997    Smokeless tobacco: Never Used   Vaping Use    Vaping Use: Never used   Substance Use Topics    Alcohol use: No     Alcohol/week: 0.0 standard drinks    Drug use: No        Allergies   Allergen Reactions    Acyclovir And Related Swelling    Ibuprofen Hives    Morphine Itching    Pcn [Penicillins] Anaphylaxis and Hives    Amoxicillin Hives and Swelling     Throat swelling    Cephalexin Hives    Macrobid [Nitrofurantoin Macrocrystal] Hives    Hydrocodone-Acetaminophen Itching    Oxycodone-Acetaminophen Itching   ,   Past Medical History:   Diagnosis Date    Anemia     Congenital anomaly of corpus callosum (HCC)     Encephalopathy     Intractable migraine without aura and without status migrainosus 2017    Kidney stones     Micrognathia     Postpartum depression 2018    Precordial pain 2019    Unspecified asthma(493.90)    ,   Past Surgical History:   Procedure Laterality Date     SECTION      DILATION AND CURETTAGE OF UTERUS N/A 2019    SUCTION DILATATION AND CURETTAGE performed by Yovani Yeh MD at [address] [place] [address], VAGINAL N/A 2020    [facility], BILATERAL SALPINGECTOMY performed by Yovani Yeh MD at [place] [address]      laparascopic sx    TONSILLECTOMY     ,   Social History     Tobacco Use    Smoking status: Light Tobacco Smoker     Packs/day: 0.25     Years: 20.00     Pack years: 5.00     Types: Cigarettes     Start date: 1997    Smokeless tobacco: Never Used   Vaping Use    Vaping Use: Never used   Substance Use Topics    Alcohol use: No     Alcohol/week: 0.0 standard drinks    Drug use: No   ,   Family History   Problem Relation Age of Onset    Asthma Mother     High Blood Pressure Mother     Asthma Father     Heart Disease Maternal Grandmother     Asthma Maternal Grandmother     Diabetes Maternal Grandmother     Kidney Disease Maternal Grandmother     Other Maternal Grandmother     Cancer Paternal Grandfather    ,   Immunization History   Administered Date(s) Administered    COVID-19, Gaitan Peter, PF, 30mcg/0.3mL 05/30/2021, 06/21/2021    Influenza, Quadv, IM, PF (6 mo and older Fluzone, Flulaval, Fluarix, and 3 yrs and older Afluria) 09/13/2016    Tdap (Boostrix, Adacel) 03/16/2018   ,   Health Maintenance   Topic Date Due    Hepatitis C screen  Never done    Varicella vaccine (1 of 2 - 2-dose childhood series) Never done    Pneumococcal 0-64 years Vaccine (1 of 2 - PPSV23) Never done    Flu vaccine (1) 09/01/2021    DTaP/Tdap/Td vaccine (2 - Td or Tdap) 03/16/2028    COVID-19 Vaccine  Completed    HIV screen  Completed    Hepatitis A vaccine  Aged Out    Hepatitis B vaccine  Aged Out    Hib vaccine  Aged Out    Meningococcal (ACWY) vaccine  Aged Out       PHYSICAL EXAMINATION:  [ INSTRUCTIONS:  \"[x]\" Indicates a positive item  \"[]\" Indicates a negative item  -- DELETE ALL ITEMS NOT EXAMINED]  [x] Alert  [x] Oriented to person/place/time    [x] No apparent distress    [x] Breathing appears normal    [x] Normal Mood        Due to this being a TeleHealth encounter, evaluation of the following organ systems is limited: Vitals/Constitutional/EENT/Resp/CV/GI//MS/Neuro/Skin/Heme-Lymph-Imm. ASSESSMENT/PLAN:  1. Bronchitis    - albuterol sulfate  (90 Base) MCG/ACT inhaler; Inhale 2 puffs into the lungs 2 times daily  Dispense: 2 Inhaler; Refill: 0  - albuterol (ACCUNEB) 1.25 MG/3ML nebulizer solution; Inhale 3 mLs into the lungs every 4 hours as needed for Wheezing  Dispense: 120 vial; Refill: 0  - azithromycin (ZITHROMAX) 250 MG tablet; Take 2 tabs DAY 1 then 1 tab DAYS 2-5  Dispense: 6 tablet;  Refill: 0  - brompheniramine-pseudoephedrine-DM 2-30-10 MG/5ML syrup; Take 5 mLs by mouth 4 times daily as needed for Congestion or Cough  Dispense: 180 mL; Refill: 0  - predniSONE (DELTASONE) 20 MG tablet; Take 2 tablets by mouth daily for 5 days  Dispense: 10 tablet; Refill: 0    2. URI with cough and congestion    - albuterol sulfate  (90 Base) MCG/ACT inhaler; Inhale 2 puffs into the lungs 2 times daily  Dispense: 2 Inhaler; Refill: 0  - albuterol (ACCUNEB) 1.25 MG/3ML nebulizer solution; Inhale 3 mLs into the lungs every 4 hours as needed for Wheezing  Dispense: 120 vial; Refill: 0  - azithromycin (ZITHROMAX) 250 MG tablet; Take 2 tabs DAY 1 then 1 tab DAYS 2-5  Dispense: 6 tablet; Refill: 0  - brompheniramine-pseudoephedrine-DM 2-30-10 MG/5ML syrup; Take 5 mLs by mouth 4 times daily as needed for Congestion or Cough  Dispense: 180 mL; Refill: 0  - predniSONE (DELTASONE) 20 MG tablet; Take 2 tablets by mouth daily for 5 days  Dispense: 10 tablet; Refill: 0    Return in about 1 week (around 8/26/2021), or if symptoms worsen or fail to improve. An  electronic signature was used to authenticate this note. --DELMER Barone - CNP on 8/19/2021 at 12:55 PM        Pursuant to the emergency declaration under the [facility], [facility] waiver authority and the [employer] Act, this Virtual  Visit was conducted, with patient's consent, to reduce the patient's risk of exposure to COVID-19 and provide continuity of care for an established patient. Services were provided through a video synchronous discussion virtually to substitute for in-person clinic visit.

## 2022-02-02 ENCOUNTER — OFFICE VISIT (OUTPATIENT)
Dept: OBGYN CLINIC | Age: 32
End: 2022-02-02
Payer: COMMERCIAL

## 2022-02-02 VITALS
SYSTOLIC BLOOD PRESSURE: 108 MMHG | BODY MASS INDEX: 21.57 KG/M2 | HEART RATE: 80 BPM | WEIGHT: 107 LBS | HEIGHT: 59 IN | DIASTOLIC BLOOD PRESSURE: 62 MMHG

## 2022-02-02 DIAGNOSIS — R23.2 HOT FLASHES: ICD-10-CM

## 2022-02-02 DIAGNOSIS — R76.8 POSITIVE ANA (ANTINUCLEAR ANTIBODY): Primary | ICD-10-CM

## 2022-02-02 PROCEDURE — G8427 DOCREV CUR MEDS BY ELIG CLIN: HCPCS | Performed by: OBSTETRICS & GYNECOLOGY

## 2022-02-02 PROCEDURE — G8420 CALC BMI NORM PARAMETERS: HCPCS | Performed by: OBSTETRICS & GYNECOLOGY

## 2022-02-02 PROCEDURE — 99213 OFFICE O/P EST LOW 20 MIN: CPT | Performed by: OBSTETRICS & GYNECOLOGY

## 2022-02-02 PROCEDURE — G8484 FLU IMMUNIZE NO ADMIN: HCPCS | Performed by: OBSTETRICS & GYNECOLOGY

## 2022-02-02 PROCEDURE — 4004F PT TOBACCO SCREEN RCVD TLK: CPT | Performed by: OBSTETRICS & GYNECOLOGY

## 2022-02-02 NOTE — PROGRESS NOTES
Maria De Jesus Germain is a 27 y.o. female who presents here today for complaints of Other (Hot flashes. Would like to discuss getting blood work done and having thyroid checked.)    Hot flashes , fatigue.    .      Vitals:  /62 (Site: Right Upper Arm, Position: Sitting, Cuff Size: Medium Adult)   Pulse 80   Ht 4' 11\" (1.499 m)   Wt 107 lb (48.5 kg)   LMP 2020   BMI 21.61 kg/m²   Allergies:  Acyclovir and related, Ibuprofen, Morphine, Pcn [penicillins], Amoxicillin, Cephalexin, Macrobid [nitrofurantoin macrocrystal], Hydrocodone-acetaminophen, and Oxycodone-acetaminophen  Past Medical History:   Diagnosis Date    Anemia     Congenital anomaly of corpus callosum ([facility] [address]     Encephalopathy     Intractable migraine without aura and without status migrainosus 2017    Kidney stones     Micrognathia     Postpartum depression 2018    Precordial pain 2019    Unspecified asthma(493.90)      Past Surgical History:   Procedure Laterality Date     SECTION      DILATION AND CURETTAGE OF UTERUS N/A 2019    SUCTION DILATATION AND CURETTAGE performed by Aly Reddy MD at [facility] [address], VAGINAL N/A 2020    [facility], BILATERAL SALPINGECTOMY performed by Aly Reddy MD at [address]      laparascopic sx    TONSILLECTOMY       OB History        6    Para   4    Term   3       1    AB   1    Living   4       SAB   1    IAB   0    Ectopic   0    Molar        Multiple   0    Live Births   4              Family History   Problem Relation Age of Onset    Asthma Mother     High Blood Pressure Mother     Asthma Father     Heart Disease Maternal Grandmother     Asthma Maternal Grandmother     Diabetes Maternal Grandmother     Kidney Disease Maternal Grandmother     Other Maternal Grandmother     Cancer Paternal Grandfather      Social History     Socioeconomic History    Marital status:      Spouse name: Not on file  Number of children: Not on file    Years of education: Not on file    Highest education level: Not on file   Occupational History    Not on file   Tobacco Use    Smoking status: Light Tobacco Smoker     Packs/day: 0.25     Years: 20.00     Pack years: 5.00     Types: Cigarettes     Start date: 1/19/1997    Smokeless tobacco: Never Used   Vaping Use    Vaping Use: Never used   Substance and Sexual Activity    Alcohol use: No     Alcohol/week: 0.0 standard drinks    Drug use: No    Sexual activity: Yes     Partners: Male   Other Topics Concern    Not on file   Social History Narrative    Not on file     Social Determinants of Health     Financial Resource Strain: Low Risk     Difficulty of Paying Living Expenses: Not very hard   Food Insecurity: No Food Insecurity    Worried About Running Out of Food in the Last Year: Never true    [place] of Food in the Last Year: Never true   Transportation Needs: No Transportation Needs    Lack of Transportation (Medical): No    Lack of Transportation (Non-Medical):  No   Physical Activity:     Days of Exercise per Week: Not on file    Minutes of Exercise per Session: Not on file   Stress:     Feeling of Stress : Not on file   Social Connections:     Frequency of Communication with Friends and Family: Not on file    Frequency of Social Gatherings with Friends and Family: Not on file    Attends Buddhist Services: Not on file    Active Member of [address] [place] [address] or Organizations: Not on file    Attends Club or Organization Meetings: Not on file    Marital Status: Not on file   Intimate Partner Violence:     Fear of Current or Ex-Partner: Not on file    Emotionally Abused: Not on file    Physically Abused: Not on file    Sexually Abused: Not on file   Housing Stability:     Unable to Pay for Housing in the Last Year: Not on file    Number of [place] in the Last Year: Not on file    Unstable Housing in the Last Year: Not on file       Contraceptive method: S/p hysterectomy     Patient's medications, allergies, past medical, surgical, social and family histories were reviewed and updated as appropriate. Review of Systems  As per chief complaint   All other systems reviewed and are negative. Hot flashes   Physical Exam:  Vitals:  /62 (Site: Right Upper Arm, Position: Sitting, Cuff Size: Medium Adult)   Pulse 80   Ht 4' 11\" (1.499 m)   Wt 107 lb (48.5 kg)   LMP 01/03/2020   BMI 21.61 kg/m²   Lungs: CTAB   Heart : Regular S1/S2, no M/R/G  Abdomen: Soft , NT, ND , + BS   Pelvic exam : deferred    Assessment:      Diagnosis Orders   1. Positive ETHAN (antinuclear antibody)  Amb External Referral To Rheumatology   2. Hot flashes  TSH with Reflex    T4, Free    Follicle Stimulating Hormone    CBC Auto Differential    Comprehensive Metabolic Panel       Plan:     History of + ETHAN , with no follow up. Referral to rheumatology   Labs ordered , follow in 2 weeks       Orders Placed This Encounter   Procedures    TSH with Reflex     Standing Status:   Future     Standing Expiration Date:   2/2/2023    T4, Free     Standing Status:   Future     Standing Expiration Date:   3/4/2587   Rubalcava Follicle Stimulating Hormone     Standing Status:   Future     Standing Expiration Date:   2/2/2023    CBC Auto Differential     Standing Status:   Future     Standing Expiration Date:   2/2/2023    Comprehensive Metabolic Panel     Standing Status:   Future     Standing Expiration Date:   2/2/2023    Amb External Referral To Rheumatology     Referral Priority:   Routine     Referral Type:   Eval and Treat     Referral Reason:   Specialty Services Required     Referred to Provider:   Halley Choi MD     Requested Specialty:   Rheumatology     Number of Visits Requested:   1     No orders of the defined types were placed in this encounter. Follow Up:  Return in about 2 weeks (around 2/16/2022) for F/U for results.         Evan Mae MD

## 2022-02-03 ENCOUNTER — TELEPHONE (OUTPATIENT)
Dept: FAMILY MEDICINE CLINIC | Age: 32
End: 2022-02-03

## 2022-02-04 ENCOUNTER — VIRTUAL VISIT (OUTPATIENT)
Dept: FAMILY MEDICINE CLINIC | Age: 32
End: 2022-02-04
Payer: COMMERCIAL

## 2022-02-04 DIAGNOSIS — R07.2 PRECORDIAL PAIN: ICD-10-CM

## 2022-02-04 DIAGNOSIS — G43.019 INTRACTABLE MIGRAINE WITHOUT AURA AND WITHOUT STATUS MIGRAINOSUS: Primary | ICD-10-CM

## 2022-02-04 DIAGNOSIS — G93.40 ENCEPHALOPATHY: ICD-10-CM

## 2022-02-04 DIAGNOSIS — R00.2 HEART PALPITATIONS: ICD-10-CM

## 2022-02-04 PROBLEM — L23.7 POISON IVY DERMATITIS: Status: RESOLVED | Noted: 2021-04-15 | Resolved: 2022-02-04

## 2022-02-04 PROCEDURE — 99423 OL DIG E/M SVC 21+ MIN: CPT | Performed by: STUDENT IN AN ORGANIZED HEALTH CARE EDUCATION/TRAINING PROGRAM

## 2022-02-04 RX ORDER — NITROGLYCERIN 0.4 MG/1
0.4 TABLET SUBLINGUAL EVERY 5 MIN PRN
Qty: 25 TABLET | Refills: 0 | Status: SHIPPED | OUTPATIENT
Start: 2022-02-04 | End: 2022-05-05

## 2022-02-04 RX ORDER — RANOLAZINE 500 MG/1
TABLET, EXTENDED RELEASE ORAL
Qty: 60 TABLET | Refills: 1 | Status: SHIPPED | OUTPATIENT
Start: 2022-02-04

## 2022-02-04 RX ORDER — PROPRANOLOL HCL 60 MG
CAPSULE, EXTENDED RELEASE 24HR ORAL
Qty: 90 CAPSULE | Refills: 0 | Status: SHIPPED | OUTPATIENT
Start: 2022-02-04

## 2022-02-04 ASSESSMENT — ENCOUNTER SYMPTOMS
ABDOMINAL PAIN: 0
NAUSEA: 0
VOMITING: 0
RHINORRHEA: 0
DIARRHEA: 0
COUGH: 0
EYE DISCHARGE: 0
WHEEZING: 0
SORE THROAT: 0
SHORTNESS OF BREATH: 0

## 2022-02-04 NOTE — PROGRESS NOTES
Bart Diallo (:  1990) is a Established patient, here for evaluation of the following:    Assessment & Plan   Below is the assessment and plan developed based on review of pertinent history, physical exam, labs, studies, and medications. 1. Intractable migraine without aura and without status migrainosus  -     propranolol (INDERAL LA) 60 MG extended release capsule; TAKE 1 CAPSULE BY MOUTH EVERY DAY, Disp-90 capsule, R-0Normal  -     External Referral to Neurology  Patient with history of intractable migraine, encephalopathy and micrognathia. She was previously referred to [facility] [employer] [facility] clinic for evaluation. She is requesting new referral today. This is been ordered. Continue current treatment medications. Refills provided. Currently well controlled with migraines. 2. Encephalopathy  -     External Referral to Neurology  As above  3. Heart palpitations  Patient with palpitations and precordial pain. She has been on Ranexa and nitroglycerin as needed. She also takes propranolol for the heart palpitations as well as migraine. This does keep her symptoms under good control. She is planning to reestablish with cardiology. She was previously seeing them. She has no other concerns at this time. Smoking cessation encouraged. 4. Precordial pain  -     nitroGLYCERIN (NITROSTAT) 0.4 MG SL tablet; Place 1 tablet under the tongue every 5 minutes as needed for Chest pain, Disp-25 tablet, R-0Normal  -     ranolazine (RANEXA) 500 MG extended release tablet; TAKE 1 TABLET BY MOUTH TWICE A DAY, Disp-60 tablet, R-1Normal  As above    Return in about 2 months (around 2022) for follow up. Subjective    Chief Complaint   Patient presents with    Medication Refill       HPI     Patient with appointment to establish care and for medication refills. She is a previous patient of Dr. Michelle Lamb.   She is also followed with neurology and cardiology in the past.  She has a history of intractable migraine without aura and encephalopathy. She has been evaluated extensively with neurology here at [facility]. She states she was referred to neurology at the [facility]. She never followed up with them. She is requesting a new referral today. She states that she had an abnormal MRI. She was noted to have issues with corpus callosum and noted micrognathia and there was concern for possible genomic disorder. Patient also with history of heart palpitations and precordial pain. She is on Ranexa, nitro as needed and propranolol. She states that this keeps all of her symptoms under control. She previously followed with cardiology. She does need refills at this time. She did have a normal stress echo. She does have family history of early cardiac disease. She states that she is planning to get reestablished with cardiology. She states that her symptoms have been under good control. She does have occasional chest pain. She is continuing to smoke. She is not ready to quit smoking. She has no other concerns at this time. Review of Systems   Constitutional: Negative for chills, fatigue, fever and unexpected weight change. HENT: Negative for congestion, rhinorrhea and sore throat. Eyes: Negative for discharge. Respiratory: Negative for cough, shortness of breath and wheezing. Cardiovascular: Positive for chest pain (hx of angina). Negative for palpitations and leg swelling. Gastrointestinal: Negative for abdominal pain, diarrhea, nausea and vomiting. Genitourinary: Negative for difficulty urinating. Musculoskeletal: Negative for arthralgias and joint swelling. Skin: Negative for rash. Neurological: Positive for headaches. Negative for weakness, light-headedness and numbness. Psychiatric/Behavioral: Negative for confusion and suicidal ideas. The patient is not nervous/anxious.            Objective   Patient-Reported Vitals  No data recorded     Physical Exam  Due to nature of virtual visit, unable to complete full physical exam at this time, patient does not sound short breath while talking. She does not appear to be in respiratory distress. On this date 2/4/2022 I have spent 22 minutes reviewing previous notes, test results and face to face (virtual) with the patient discussing the diagnosis and importance of compliance with the treatment plan as well as documenting on the day of the visit. Corwin Romano, was evaluated through a synchronous (real-time) audio-video encounter. The patient (or guardian if applicable) is aware that this is a billable service, which includes applicable co-pays. This Virtual Visit was conducted with patient's (and/or legal guardian's) consent. The visit was conducted pursuant to the emergency declaration under the [address] [place] [address], [address] [place] [address] [facility] authority and the [employer] General Act. Patient identification was verified, and a caregiver was present when appropriate. The patient was located at home in a state where the provider was licensed to provide care.        --Javier Mcintyre, DO

## 2022-02-04 NOTE — TELEPHONE ENCOUNTER
----- Message from Nneka Tate sent at 2/3/2022  3:20 PM EST -----  Subject: Message to Provider    QUESTIONS  Information for Provider? Would like to be mailed her last visits, mri's,   medications etc for social security.  ---------------------------------------------------------------------------  --------------  Judy Faes INFO  What is the best way for the office to contact you? OK to leave message on   voicemail  Preferred Call Back Phone Number? [phone]  ---------------------------------------------------------------------------  --------------  SCRIPT ANSWERS  Relationship to Patient?  Self

## 2022-02-07 NOTE — TELEPHONE ENCOUNTER
Called patient and patient will be calling social secruity to get the paper they need faxed over to us .

## 2022-02-09 DIAGNOSIS — R23.2 HOT FLASHES: ICD-10-CM

## 2022-02-09 LAB
ALBUMIN SERPL-MCNC: 4.4 G/DL (ref 3.5–4.6)
ALP BLD-CCNC: 47 U/L (ref 40–130)
ALT SERPL-CCNC: 18 U/L (ref 0–33)
ANION GAP SERPL CALCULATED.3IONS-SCNC: 11 MEQ/L (ref 9–15)
AST SERPL-CCNC: 18 U/L (ref 0–35)
BASOPHILS ABSOLUTE: 0 K/UL (ref 0–0.2)
BASOPHILS RELATIVE PERCENT: 0.6 %
BILIRUB SERPL-MCNC: 0.4 MG/DL (ref 0.2–0.7)
BUN BLDV-MCNC: 7 MG/DL (ref 6–20)
CALCIUM SERPL-MCNC: 9.4 MG/DL (ref 8.5–9.9)
CHLORIDE BLD-SCNC: 102 MEQ/L (ref 95–107)
CO2: 23 MEQ/L (ref 20–31)
CREAT SERPL-MCNC: 0.78 MG/DL (ref 0.5–0.9)
EOSINOPHILS ABSOLUTE: 0.2 K/UL (ref 0–0.7)
EOSINOPHILS RELATIVE PERCENT: 2.8 %
FOLLICLE STIMULATING HORMONE: 8.2 U/L (ref 1.7–21.5)
GFR AFRICAN AMERICAN: >60
GFR NON-AFRICAN AMERICAN: >60
GLOBULIN: 2.7 G/DL (ref 2.3–3.5)
GLUCOSE BLD-MCNC: 85 MG/DL (ref 70–99)
HCT VFR BLD CALC: 38.8 % (ref 37–47)
HEMOGLOBIN: 13.3 G/DL (ref 12–16)
LYMPHOCYTES ABSOLUTE: 2.9 K/UL (ref 1–4.8)
LYMPHOCYTES RELATIVE PERCENT: 37.6 %
MCH RBC QN AUTO: 33.3 PG (ref 27–31.3)
MCHC RBC AUTO-ENTMCNC: 34.3 % (ref 33–37)
MCV RBC AUTO: 97.1 FL (ref 82–100)
MONOCYTES ABSOLUTE: 0.4 K/UL (ref 0.2–0.8)
MONOCYTES RELATIVE PERCENT: 5.7 %
NEUTROPHILS ABSOLUTE: 4.1 K/UL (ref 1.4–6.5)
NEUTROPHILS RELATIVE PERCENT: 53.3 %
PDW BLD-RTO: 12.6 % (ref 11.5–14.5)
PLATELET # BLD: 226 K/UL (ref 130–400)
POTASSIUM SERPL-SCNC: 4.7 MEQ/L (ref 3.4–4.9)
RBC # BLD: 4 M/UL (ref 4.2–5.4)
SODIUM BLD-SCNC: 136 MEQ/L (ref 135–144)
T4 FREE: 1.25 NG/DL (ref 0.84–1.68)
TOTAL PROTEIN: 7.1 G/DL (ref 6.3–8)
TSH REFLEX: 1.33 UIU/ML (ref 0.44–3.86)
WBC # BLD: 7.8 K/UL (ref 4.8–10.8)

## 2022-02-11 ENCOUNTER — TELEPHONE (OUTPATIENT)
Dept: OBGYN CLINIC | Age: 32
End: 2022-02-11

## 2022-02-11 NOTE — TELEPHONE ENCOUNTER
Patient called in to discuss results that were released to [address] [place] [address] [address].  Please advise

## 2022-02-14 NOTE — TELEPHONE ENCOUNTER
Labs with in normal. Never mind the yellow labeling for abnormal ref range that probably prompted patient to call. Please advise patient to follow with rheumatology as I advised last visit.

## 2022-02-15 NOTE — TELEPHONE ENCOUNTER
Spoke to the pt made her aware. She has a scheduled appt with Dr. Adrienne Escalera 2-24-22 and Rheumatology in May .

## 2022-04-29 ENCOUNTER — HOSPITAL ENCOUNTER (EMERGENCY)
Age: 32
Discharge: HOME | End: 2022-04-29
Attending: EMERGENCY MEDICINE
Payer: COMMERCIAL

## 2022-04-29 ENCOUNTER — APPOINTMENT (OUTPATIENT)
Dept: GENERAL RADIOLOGY | Age: 32
End: 2022-04-29
Payer: COMMERCIAL

## 2022-04-29 VITALS
RESPIRATION RATE: 18 BRPM | OXYGEN SATURATION: 98 % | TEMPERATURE: 97.8 F | DIASTOLIC BLOOD PRESSURE: 73 MMHG | HEIGHT: 59 IN | WEIGHT: 102 LBS | BODY MASS INDEX: 20.56 KG/M2 | SYSTOLIC BLOOD PRESSURE: 118 MMHG | HEART RATE: 87 BPM

## 2022-04-29 DIAGNOSIS — B34.9 VIRAL SYNDROME: Primary | ICD-10-CM

## 2022-04-29 LAB
ANION GAP SERPL CALCULATED.3IONS-SCNC: 12 MEQ/L (ref 9–15)
BASOPHILS ABSOLUTE: 0 K/UL (ref 0–0.2)
BASOPHILS RELATIVE PERCENT: 0.6 %
BUN BLDV-MCNC: 12 MG/DL (ref 6–20)
CALCIUM SERPL-MCNC: 9.7 MG/DL (ref 8.5–9.9)
CHLORIDE BLD-SCNC: 102 MEQ/L (ref 95–107)
CO2: 20 MEQ/L (ref 20–31)
CREAT SERPL-MCNC: 0.78 MG/DL (ref 0.5–0.9)
EOSINOPHILS ABSOLUTE: 0.1 K/UL (ref 0–0.7)
EOSINOPHILS RELATIVE PERCENT: 1.8 %
GFR AFRICAN AMERICAN: >60
GFR NON-AFRICAN AMERICAN: >60
GLUCOSE BLD-MCNC: 93 MG/DL (ref 70–99)
HCT VFR BLD CALC: 37.8 % (ref 37–47)
HEMOGLOBIN: 13.1 G/DL (ref 12–16)
INFLUENZA A BY PCR: NEGATIVE
INFLUENZA B BY PCR: NEGATIVE
LYMPHOCYTES ABSOLUTE: 0.7 K/UL (ref 1–4.8)
LYMPHOCYTES RELATIVE PERCENT: 10.6 %
MCH RBC QN AUTO: 33.4 PG (ref 27–31.3)
MCHC RBC AUTO-ENTMCNC: 34.6 % (ref 33–37)
MCV RBC AUTO: 96.5 FL (ref 82–100)
MONOCYTES ABSOLUTE: 0.6 K/UL (ref 0.2–0.8)
MONOCYTES RELATIVE PERCENT: 9.7 %
NEUTROPHILS ABSOLUTE: 4.8 K/UL (ref 1.4–6.5)
NEUTROPHILS RELATIVE PERCENT: 77.3 %
PDW BLD-RTO: 12.5 % (ref 11.5–14.5)
PLATELET # BLD: 185 K/UL (ref 130–400)
POTASSIUM SERPL-SCNC: 4.5 MEQ/L (ref 3.4–4.9)
RBC # BLD: 3.92 M/UL (ref 4.2–5.4)
SARS-COV-2, NAAT: NOT DETECTED
SODIUM BLD-SCNC: 134 MEQ/L (ref 135–144)
WBC # BLD: 6.3 K/UL (ref 4.8–10.8)

## 2022-04-29 PROCEDURE — 87635 SARS-COV-2 COVID-19 AMP PRB: CPT

## 2022-04-29 PROCEDURE — 71045 X-RAY EXAM CHEST 1 VIEW: CPT

## 2022-04-29 PROCEDURE — 6360000002 HC RX W HCPCS: Performed by: EMERGENCY MEDICINE

## 2022-04-29 PROCEDURE — 87502 INFLUENZA DNA AMP PROBE: CPT

## 2022-04-29 PROCEDURE — 96375 TX/PRO/DX INJ NEW DRUG ADDON: CPT

## 2022-04-29 PROCEDURE — 99284 EMERGENCY DEPT VISIT MOD MDM: CPT

## 2022-04-29 PROCEDURE — 99285 EMERGENCY DEPT VISIT HI MDM: CPT

## 2022-04-29 PROCEDURE — 36415 COLL VENOUS BLD VENIPUNCTURE: CPT

## 2022-04-29 PROCEDURE — 93005 ELECTROCARDIOGRAM TRACING: CPT

## 2022-04-29 PROCEDURE — 80048 BASIC METABOLIC PNL TOTAL CA: CPT

## 2022-04-29 PROCEDURE — 85025 COMPLETE CBC W/AUTO DIFF WBC: CPT

## 2022-04-29 PROCEDURE — 96374 THER/PROPH/DIAG INJ IV PUSH: CPT

## 2022-04-29 PROCEDURE — 6370000000 HC RX 637 (ALT 250 FOR IP): Performed by: EMERGENCY MEDICINE

## 2022-04-29 RX ORDER — PROCHLORPERAZINE EDISYLATE 5 MG/ML
10 INJECTION INTRAMUSCULAR; INTRAVENOUS EVERY 6 HOURS PRN
Status: DISCONTINUED | OUTPATIENT
Start: 2022-04-29 | End: 2022-04-29 | Stop reason: HOSPADM

## 2022-04-29 RX ORDER — DIPHENHYDRAMINE HYDROCHLORIDE 50 MG/ML
25 INJECTION INTRAMUSCULAR; INTRAVENOUS ONCE
Status: COMPLETED | OUTPATIENT
Start: 2022-04-29 | End: 2022-04-29

## 2022-04-29 RX ORDER — BUTALBITAL, ACETAMINOPHEN AND CAFFEINE 300; 40; 50 MG/1; MG/1; MG/1
1 CAPSULE ORAL ONCE
Status: COMPLETED | OUTPATIENT
Start: 2022-04-29 | End: 2022-04-29

## 2022-04-29 RX ADMIN — PROCHLORPERAZINE EDISYLATE 10 MG: 5 INJECTION INTRAMUSCULAR; INTRAVENOUS at 05:22

## 2022-04-29 RX ADMIN — BUTALBITA,ACETAMINOPHEN AND CAFFEINE 1 CAPSULE: 50; 300; 40 CAPSULE ORAL at 05:24

## 2022-04-29 RX ADMIN — DIPHENHYDRAMINE HYDROCHLORIDE 25 MG: 50 INJECTION, SOLUTION INTRAMUSCULAR; INTRAVENOUS at 05:21

## 2022-04-29 ASSESSMENT — PAIN DESCRIPTION - LOCATION
LOCATION: CHEST
LOCATION: HEAD

## 2022-04-29 ASSESSMENT — PAIN - FUNCTIONAL ASSESSMENT: PAIN_FUNCTIONAL_ASSESSMENT: 0-10

## 2022-04-29 ASSESSMENT — PAIN SCALES - GENERAL
PAINLEVEL_OUTOF10: 5
PAINLEVEL_OUTOF10: 4

## 2022-04-29 ASSESSMENT — PAIN DESCRIPTION - ORIENTATION: ORIENTATION: MID

## 2022-04-29 NOTE — ED PROVIDER NOTES
[facility] ED  EMERGENCY DEPARTMENT ENCOUNTER      Pt Name: Robbin Antunez  MRN: [id number]  [place] 1990  Date of evaluation: 4/29/2022  Provider: Rafael Moreno, [address] [place] [address]       Chief Complaint   Patient presents with    Other     Pt states multiple complaints. Pt states chest pain, migraine, fever, chills. Pt states she \"can feel her temp breaking. \" Pt took 2 excedrin before she came in. Temp 97.8 in triage. Pt states she feels terrible. HISTORY OF PRESENT ILLNESS   (Location/Symptom, Timing/Onset, Context/Setting, Quality, Duration, Modifying Factors, Severity)  Note limiting factors. Robbin Antunez is a 34 y.o. female who presents to the emergency department for evaluation of flulike symptoms. History of migraines, anxiety, anemia. Denies drug or alcohol use or anticoagulation use. Says she was feeling fine recently, she woke up and had a fever greater than 102, took Excedrin. Here afebrile. She took a bath, began to have her typical headache, pressure, lightheaded. She did note that she felt a \" pulse\" in her chest momentarily. This resolved. Did not endorse neck pain, jaw pain, chest pain, shortness of breath, vomiting, abdominal pain, back pain, calf pain or swelling. No recent surgeries, history of DVT or PE.    HPI    Nursing Notes were reviewed. REVIEW OF SYSTEMS    (2-9 systems for level 4, 10 or more for level 5)     Review of Systems   Constitutional: Positive for fever (resolved). Migraine   Skin: Negative for rash. Neurological: Negative for syncope, weakness and numbness. All other systems reviewed and are negative. Except as noted above the remainder of the review of systems was reviewed and negative.        PAST MEDICAL HISTORY     Past Medical History:   Diagnosis Date    Anemia     Congenital anomaly of corpus callosum (HCC)     Encephalopathy     Intractable migraine without aura and without status migrainosus 2017    Kidney stones     Micrognathia     Postpartum depression 2018    Precordial pain 2019    Unspecified asthma(493.90)          SURGICAL HISTORY       Past Surgical History:   Procedure Laterality Date     SECTION      DILATION AND CURETTAGE OF UTERUS N/A 2019    SUCTION DILATATION AND CURETTAGE performed by Rajeev Hernandez MD at [address] [place] [address], VAGINAL N/A 2020    [facility], BILATERAL SALPINGECTOMY performed by Rajeev Hernandez MD at [facility]      laparascopic sx    TONSILLECTOMY           CURRENT MEDICATIONS       Current Discharge Medication List      CONTINUE these medications which have NOT CHANGED    Details   propranolol (INDERAL LA) 60 MG extended release capsule TAKE 1 CAPSULE BY MOUTH EVERY DAY  Qty: 90 capsule, Refills: 0    Associated Diagnoses: Intractable migraine without aura and without status migrainosus      nitroGLYCERIN (NITROSTAT) 0.4 MG SL tablet Place 1 tablet under the tongue every 5 minutes as needed for Chest pain  Qty: 25 tablet, Refills: 0    Associated Diagnoses: Precordial pain      ranolazine (RANEXA) 500 MG extended release tablet TAKE 1 TABLET BY MOUTH TWICE A DAY  Qty: 60 tablet, Refills: 1    Associated Diagnoses: Precordial pain      albuterol sulfate  (90 Base) MCG/ACT inhaler Inhale 2 puffs into the lungs 2 times daily  Qty: 2 Inhaler, Refills: 0    Associated Diagnoses: Bronchitis; URI with cough and congestion      albuterol (ACCUNEB) 1.25 MG/3ML nebulizer solution Inhale 3 mLs into the lungs every 4 hours as needed for Wheezing  Qty: 120 vial, Refills: 0    Associated Diagnoses: Bronchitis; URI with cough and congestion      brompheniramine-pseudoephedrine-DM 2-30-10 MG/5ML syrup Take 5 mLs by mouth 4 times daily as needed for Congestion or Cough  Qty: 180 mL, Refills: 0    Associated Diagnoses: Bronchitis; URI with cough and congestion      topiramate (TOPAMAX) 50 MG tablet TAKE 1 TABLET BY MOUTH TWICE A DAY  Qty: 180 tablet, Refills: 1    Associated Diagnoses: Intractable migraine without aura and without status migrainosus      ondansetron (ZOFRAN) 8 MG tablet Take 1 tablet by mouth every 8 hours as needed for Nausea or Vomiting  Qty: 40 tablet, Refills: 0    Associated Diagnoses: Intractable migraine without aura and without status migrainosus      Rimegepant Sulfate (NURTEC) 75 MG TBDP Take 1 tablet orally once as needed for migraine headache. Qty: 30 tablet      fluocinolone (SYNALAR) 0.025 % cream Apply topically 2 times daily. Qty: 60 g, Refills: 0      omeprazole (PRILOSEC) 20 MG delayed release capsule Take 1 capsule by mouth every morning (before breakfast)  Qty: 90 capsule, Refills: 1    Associated Diagnoses: Gastroesophageal reflux disease without esophagitis      betamethasone dipropionate (DIPROLENE) 0.05 % ointment Apply topically daily. Qty: 50 g, Refills: 0    Associated Diagnoses: Poison ivy dermatitis      clobetasol (TEMOVATE) 0.05 % ointment Apply topically 2 times daily.   Qty: 1 Tube, Refills: 2    Associated Diagnoses: Generalized pruritus      BD INSULIN SYRINGE U/F 31G X 5/16\" 0.5 ML MISC USE AS DIRECTED WITH SUMATRIPTAN      simethicone (MYLICON) 80 MG chewable tablet Take 1 tablet by mouth 4 times daily as needed for Flatulence  Qty: 180 tablet, Refills: 1      metoclopramide (REGLAN) 10 MG tablet Take 1 tablet by mouth every 6 hours as needed (N/V)  Qty: 30 tablet, Refills: 0             ALLERGIES     Acyclovir and related, Ibuprofen, Morphine, Pcn [penicillins], Amoxicillin, Cephalexin, Macrobid [nitrofurantoin macrocrystal], Hydrocodone-acetaminophen, and Oxycodone-acetaminophen    FAMILY HISTORY       Family History   Problem Relation Age of Onset    Asthma Mother     High Blood Pressure Mother     Asthma Father     Heart Disease Maternal Grandmother     Asthma Maternal Grandmother     Diabetes Maternal Grandmother     Kidney Disease Maternal Grandmother     Other Maternal Grandmother     Cancer Paternal Grandfather           SOCIAL HISTORY       Social History     Socioeconomic History    Marital status:      Spouse name: None    Number of children: None    Years of education: None    Highest education level: None   Occupational History    None   Tobacco Use    Smoking status: Light Tobacco Smoker     Packs/day: 0.25     Years: 20.00     Pack years: 5.00     Types: Cigarettes     Start date: 1/19/1997    Smokeless tobacco: Never Used   Vaping Use    Vaping Use: Never used   Substance and Sexual Activity    Alcohol use: No     Alcohol/week: 0.0 standard drinks    Drug use: No    Sexual activity: Yes     Partners: Male   Other Topics Concern    None   Social History Narrative    None     Social Determinants of Health     Financial Resource Strain:     Difficulty of Paying Living Expenses: Not on file   Food Insecurity:     Worried About Running Out of Food in the Last Year: Not on file    [place] of Food in the Last Year: Not on file   Transportation Needs:     Lack of Transportation (Medical): Not on file    Lack of Transportation (Non-Medical):  Not on file   Physical Activity:     Days of Exercise per Week: Not on file    Minutes of Exercise per Session: Not on file   Stress:     Feeling of Stress : Not on file   Social Connections:     Frequency of Communication with Friends and Family: Not on file    Frequency of Social Gatherings with Friends and Family: Not on file    Attends Jewish Services: Not on file    Active Member of Clubs or Organizations: Not on file    Attends Club or Organization Meetings: Not on file    Marital Status: Not on file   Intimate Partner Violence:     Fear of Current or Ex-Partner: Not on file    Emotionally Abused: Not on file    Physically Abused: Not on file    Sexually Abused: Not on file   Housing Stability:     Unable to Pay for Housing in the Last Year: Not on file    Number of [facility] in the Last Year: Not on file    Unstable Housing in the Last Year: Not on file       SCREENINGS         [place] Coma Scale  Eye Opening: Spontaneous  Best Verbal Response: Oriented  Best Motor Response: Obeys commands  Evi Coma Scale Score: 15                     CIWA Assessment  BP: 118/73  Pulse: 87                 PHYSICAL EXAM    (up to 7 for level 4, 8 or more for level 5)     ED Triage Vitals [04/29/22 0434]   BP Temp Temp Source Pulse Resp SpO2 Height Weight   118/73 97.8 °F (36.6 °C) Oral 87 18 98 % 4' 11\" (1.499 m) 102 lb (46.3 kg)       Physical Exam  Constitutional:       General: She is not in acute distress. Appearance: She is not toxic-appearing or diaphoretic. HENT:      Head: Normocephalic and atraumatic. Right Ear: Tympanic membrane normal.      Left Ear: Tympanic membrane normal.      Mouth/Throat:      Mouth: Mucous membranes are moist.      Pharynx: No oropharyngeal exudate. Eyes:      General: No scleral icterus. Extraocular Movements: Extraocular movements intact. Conjunctiva/sclera: Conjunctivae normal.      Pupils: Pupils are equal, round, and reactive to light. Cardiovascular:      Rate and Rhythm: Normal rate and regular rhythm. Pulses: Normal pulses. Pulmonary:      Effort: Pulmonary effort is normal.      Breath sounds: Normal breath sounds. Abdominal:      Tenderness: There is no abdominal tenderness. There is no guarding. Hernia: No hernia is present. Musculoskeletal:      Cervical back: Normal range of motion. No rigidity. Right lower leg: No edema. Left lower leg: No edema. Skin:     Capillary Refill: Capillary refill takes less than 2 seconds. Findings: No rash. Neurological:      General: No focal deficit present. Mental Status: She is alert and oriented to person, place, and time. Cranial Nerves: No cranial nerve deficit. Sensory: No sensory deficit. Motor: No weakness.    Psychiatric:         Mood and Affect: Mood normal.     no upper extremity pronator drift  No nuchal rigidity  No focal findings    DIAGNOSTIC RESULTS     EKG: All EKG's are interpreted by the Emergency Department Physician who either signs or Co-signs this chart in the absence of a cardiologist.    Sinus rhythm, normal rate 80, normal axis, , IN interval 106, no delta wave/WPW findings, no STEMI    RADIOLOGY:   Non-plain film images such as CT, Ultrasound and MRI are read by the radiologist. Plain radiographic images are visualized and preliminarily interpreted by the emergency physician with the below findings:    No focal consolidation. No cardiomegaly. No pneumothorax. Interpretation per the Radiologist below, if available at the time of this note:    XR CHEST PORTABLE    (Results Pending)         ED BEDSIDE ULTRASOUND:   Performed by ED Physician - none    LABS:  Labs Reviewed   CBC WITH AUTO DIFFERENTIAL - Abnormal; Notable for the following components:       Result Value    RBC 3.92 (*)     MCH 33.4 (*)     Lymphocytes Absolute 0.7 (*)     All other components within normal limits   BASIC METABOLIC PANEL - Abnormal; Notable for the following components:    Sodium 134 (*)     All other components within normal limits   COVID-19, RAPID   RAPID INFLUENZA A/B ANTIGENS       All other labs were within normal range or not returned as of this dictation.     EMERGENCY DEPARTMENT COURSE and DIFFERENTIAL DIAGNOSIS/MDM:   Vitals:    Vitals:    04/29/22 0434   BP: 118/73   Pulse: 87   Resp: 18   Temp: 97.8 °F (36.6 °C)   TempSrc: Oral   SpO2: 98%   Weight: 102 lb (46.3 kg)   Height: 4' 11\" (1.499 m)         Labs reassuring  Plain film unremarkable  Saturating well on room air  MDM  Nonischemic EKG  nonfocal neuro exam  Symptom improvement   Non consistent with  Meningitis/encephalitits or other life threatening acute pathology    REASSESSMENT          CRITICAL CARE TIME     CONSULTS:  None    PROCEDURES:  Unless otherwise noted below, none     Procedures        FINAL IMPRESSION      1. Viral syndrome          DISPOSITION/PLAN   DISPOSITION        PATIENT REFERRED TO:  [address] [id number] [phone]            DISCHARGE MEDICATIONS:  Current Discharge Medication List        Controlled Substances Monitoring:     No flowsheet data found.     (Please note that portions of this note were completed with a voice recognition program.  Efforts were made to edit the dictations but occasionally words are mis-transcribed.)    Severiano Persaud MD (electronically signed)  Attending Emergency Physician           Severiano Persaud MD  04/29/22 6485

## 2022-05-02 LAB
EKG ATRIAL RATE: 80 BPM
EKG P AXIS: 71 DEGREES
EKG P-R INTERVAL: 106 MS
EKG Q-T INTERVAL: 374 MS
EKG QRS DURATION: 90 MS
EKG QTC CALCULATION (BAZETT): 431 MS
EKG R AXIS: 59 DEGREES
EKG T AXIS: 13 DEGREES
EKG VENTRICULAR RATE: 80 BPM

## 2022-08-01 NOTE — DISCHARGE INSTR - ACTIVITY
Gradually increase your activity. No heavy lifting, straining, pushing, pulling. Detail Level: Detailed

## 2023-01-05 ENCOUNTER — OFFICE VISIT (OUTPATIENT)
Dept: OBGYN CLINIC | Age: 33
End: 2023-01-05
Payer: COMMERCIAL

## 2023-01-05 VITALS
HEART RATE: 76 BPM | SYSTOLIC BLOOD PRESSURE: 98 MMHG | WEIGHT: 103 LBS | DIASTOLIC BLOOD PRESSURE: 66 MMHG | BODY MASS INDEX: 20.8 KG/M2

## 2023-01-05 DIAGNOSIS — R68.82 DECREASED LIBIDO: ICD-10-CM

## 2023-01-05 DIAGNOSIS — R76.8 POSITIVE ANA (ANTINUCLEAR ANTIBODY): ICD-10-CM

## 2023-01-05 DIAGNOSIS — R23.2 HOT FLASHES: Primary | ICD-10-CM

## 2023-01-05 DIAGNOSIS — R23.2 HOT FLASHES: ICD-10-CM

## 2023-01-05 LAB
PROLACTIN: 7.2 NG/ML
T4 FREE: 1.03 NG/DL (ref 0.84–1.68)
TSH REFLEX: 1.89 UIU/ML (ref 0.44–3.86)

## 2023-01-05 PROCEDURE — G8427 DOCREV CUR MEDS BY ELIG CLIN: HCPCS | Performed by: OBSTETRICS & GYNECOLOGY

## 2023-01-05 PROCEDURE — G8484 FLU IMMUNIZE NO ADMIN: HCPCS | Performed by: OBSTETRICS & GYNECOLOGY

## 2023-01-05 PROCEDURE — 99214 OFFICE O/P EST MOD 30 MIN: CPT | Performed by: OBSTETRICS & GYNECOLOGY

## 2023-01-05 PROCEDURE — 4004F PT TOBACCO SCREEN RCVD TLK: CPT | Performed by: OBSTETRICS & GYNECOLOGY

## 2023-01-05 PROCEDURE — G8420 CALC BMI NORM PARAMETERS: HCPCS | Performed by: OBSTETRICS & GYNECOLOGY

## 2023-01-05 ASSESSMENT — ENCOUNTER SYMPTOMS
VOMITING: 0
BLOOD IN STOOL: 0
NAUSEA: 0
COLOR CHANGE: 0
CONSTIPATION: 0
CHEST TIGHTNESS: 0
BACK PAIN: 0
ABDOMINAL DISTENTION: 0
ABDOMINAL PAIN: 0
SORE THROAT: 0
SHORTNESS OF BREATH: 0
WHEEZING: 0
TROUBLE SWALLOWING: 0
COUGH: 0
VOICE CHANGE: 0

## 2023-01-05 NOTE — PROGRESS NOTES
HPI:  William Bustamante (: 1990) is a 34 y.o. female, Established patient, here for evaluation of the following chief complaint(s):  Discuss Medications (C/o hot flashes, low libido and unable to gain weight since [facility] )  Patient complains of occasional hot flashes, inability to gain weight, and decrease in libido. She is a 26-year-old female whose had previous hysterectomy. In February she had some blood work drawn which showed normal [address] [place] [address] normal thyroid studies. She did have a positive ETHAN which she was referred to a rheumatologist and never went      SUBJECTIVE/OBJECTIVE:    Past Surgical History:   Procedure Laterality Date     SECTION      DILATION AND CURETTAGE OF UTERUS N/A 2019    SUCTION DILATATION AND CURETTAGE performed by Magda Quintanilla MD at [address] [place] [address], VAGINAL N/A 2020    [facility], BILATERAL SALPINGECTOMY performed by Magda Quintanilla MD at [place] [address]      laparascopic sx    TONSILLECTOMY          Review of Systems   Constitutional:  Positive for unexpected weight change. Negative for activity change, appetite change and fatigue. HENT:  Negative for dental problem, ear pain, hearing loss, nosebleeds, sore throat, trouble swallowing and voice change. Eyes:  Negative for visual disturbance. Respiratory:  Negative for cough, chest tightness, shortness of breath and wheezing. Cardiovascular:  Negative for chest pain and palpitations. Gastrointestinal:  Negative for abdominal distention, abdominal pain, blood in stool, constipation, nausea and vomiting. Endocrine: Negative for cold intolerance, heat intolerance, polydipsia, polyphagia and polyuria. Genitourinary:  Negative for difficulty urinating, dyspareunia, dysuria, flank pain, frequency, genital sores, hematuria, menstrual problem, pelvic pain, urgency, vaginal bleeding, vaginal discharge and vaginal pain.    Musculoskeletal:  Negative for arthralgias, back pain, joint swelling and myalgias. Skin:  Negative for color change and rash. Allergic/Immunologic: Negative for environmental allergies, food allergies and immunocompromised state. Neurological:  Negative for dizziness, seizures, syncope, speech difficulty, weakness, numbness and headaches. Hematological:  Negative for adenopathy. Does not bruise/bleed easily. Psychiatric/Behavioral:  Negative for agitation, behavioral problems, confusion, decreased concentration, dysphoric mood and suicidal ideas. The patient is not nervous/anxious and is not hyperactive. Physical Exam  Vitals and nursing note reviewed. Constitutional:       Appearance: Normal appearance. Neurological:      Mental Status: She is alert. Vitals:    01/05/23 0949   BP: 98/66   Pulse: 76   Weight: 103 lb (46.7 kg)         ASSESSMENT/PLAN:     Diagnosis Orders   1. Hot flashes  Follicle Stimulating Hormone    TSH with Reflex    Prolactin    Luteinizing Hormone    Estradiol    T3, Free    T4, Free    External Referral to Rheumatology      2. Decreased libido  Follicle Stimulating Hormone    TSH with Reflex    Prolactin    Luteinizing Hormone    Estradiol    T3, Free    T4, Free    External Referral to Rheumatology      3. Positive ETHAN (antinuclear antibody)  External Referral to Rheumatology          PLAN: We will get a battery of labs. Patient really referred to the rheumatologist.  We discussed possible need for a low-dose of estrogen. .  Patient to follow-up. 2 weeks      No follow-ups on file. On this date 1/5/2023 I have spent 30 minutes reviewing previous notes, test results and face to face with the patient discussing the diagnosis and importance of compliance with the treatment plan as well as documenting on the day of the visit. An electronic signature was used to authenticate this note.  Please note this report has been partially produced using speech recognition software and may cause contain errors related to that system including grammar, punctuation and spelling as well as words and phrases that may seem inappropriate. If there are questions or concerns please feel free to contact me to clarify.

## 2023-01-06 LAB
ESTRADIOL LEVEL: 127.2 PG/ML (ref 27–314)
FOLLICLE STIMULATING HORMONE: 5.3 MIU/ML (ref 1.7–21.5)
LH: 14.4 MIU/ML (ref 1–95.6)
T3 FREE: 3.23 PG/ML (ref 2.02–4.43)

## 2023-01-10 ENCOUNTER — TELEPHONE (OUTPATIENT)
Dept: OBGYN CLINIC | Age: 33
End: 2023-01-10

## 2023-01-10 NOTE — TELEPHONE ENCOUNTER
Pt also calling to inform doctor she could only get into the rheumatologist in August.  Does she need sooner than that or is August ok?

## 2023-03-05 ENCOUNTER — APPOINTMENT (OUTPATIENT)
Dept: GENERAL RADIOLOGY | Age: 33
End: 2023-03-05
Payer: COMMERCIAL

## 2023-03-05 ENCOUNTER — HOSPITAL ENCOUNTER (EMERGENCY)
Age: 33
Discharge: HOME | End: 2023-03-05
Payer: COMMERCIAL

## 2023-03-05 VITALS
BODY MASS INDEX: 21.21 KG/M2 | DIASTOLIC BLOOD PRESSURE: 79 MMHG | HEART RATE: 68 BPM | RESPIRATION RATE: 18 BRPM | OXYGEN SATURATION: 99 % | TEMPERATURE: 98.3 F | SYSTOLIC BLOOD PRESSURE: 112 MMHG | WEIGHT: 105 LBS

## 2023-03-05 DIAGNOSIS — R07.9 CHEST PAIN, UNSPECIFIED TYPE: Primary | ICD-10-CM

## 2023-03-05 LAB
ALBUMIN SERPL-MCNC: 4.6 G/DL (ref 3.5–4.6)
ALP BLD-CCNC: 54 U/L (ref 40–130)
ALT SERPL-CCNC: 14 U/L (ref 0–33)
ANION GAP SERPL CALCULATED.3IONS-SCNC: 10 MEQ/L (ref 9–15)
AST SERPL-CCNC: 15 U/L (ref 0–35)
BASOPHILS ABSOLUTE: 0 K/UL (ref 0–0.2)
BASOPHILS RELATIVE PERCENT: 0.4 %
BILIRUB SERPL-MCNC: <0.2 MG/DL (ref 0.2–0.7)
BUN BLDV-MCNC: 8 MG/DL (ref 6–20)
CALCIUM SERPL-MCNC: 9.9 MG/DL (ref 8.5–9.9)
CHLORIDE BLD-SCNC: 99 MEQ/L (ref 95–107)
CO2: 27 MEQ/L (ref 20–31)
CREAT SERPL-MCNC: 0.67 MG/DL (ref 0.5–0.9)
EOSINOPHILS ABSOLUTE: 0.1 K/UL (ref 0–0.7)
EOSINOPHILS RELATIVE PERCENT: 1.5 %
GFR SERPL CREATININE-BSD FRML MDRD: >60 ML/MIN/{1.73_M2}
GLOBULIN: 3.1 G/DL (ref 2.3–3.5)
GLUCOSE BLD-MCNC: 104 MG/DL (ref 70–99)
HCT VFR BLD CALC: 41.6 % (ref 37–47)
HEMOGLOBIN: 14 G/DL (ref 12–16)
LYMPHOCYTES ABSOLUTE: 3.9 K/UL (ref 1–4.8)
LYMPHOCYTES RELATIVE PERCENT: 39.8 %
MCH RBC QN AUTO: 32.6 PG (ref 27–31.3)
MCHC RBC AUTO-ENTMCNC: 33.5 % (ref 33–37)
MCV RBC AUTO: 97.1 FL (ref 79.4–94.8)
MONOCYTES ABSOLUTE: 0.5 K/UL (ref 0.2–0.8)
MONOCYTES RELATIVE PERCENT: 5.1 %
NEUTROPHILS ABSOLUTE: 5.2 K/UL (ref 1.4–6.5)
NEUTROPHILS RELATIVE PERCENT: 53.2 %
PDW BLD-RTO: 13.7 % (ref 11.5–14.5)
PLATELET # BLD: 246 K/UL (ref 130–400)
POTASSIUM SERPL-SCNC: 3.9 MEQ/L (ref 3.4–4.9)
RBC # BLD: 4.28 M/UL (ref 4.2–5.4)
SODIUM BLD-SCNC: 136 MEQ/L (ref 135–144)
TOTAL PROTEIN: 7.7 G/DL (ref 6.3–8)
TROPONIN: <0.01 NG/ML (ref 0–0.01)
WBC # BLD: 9.7 K/UL (ref 4.8–10.8)

## 2023-03-05 PROCEDURE — 84484 ASSAY OF TROPONIN QUANT: CPT

## 2023-03-05 PROCEDURE — 80053 COMPREHEN METABOLIC PANEL: CPT

## 2023-03-05 PROCEDURE — 71045 X-RAY EXAM CHEST 1 VIEW: CPT

## 2023-03-05 PROCEDURE — 85025 COMPLETE CBC W/AUTO DIFF WBC: CPT

## 2023-03-05 PROCEDURE — 36415 COLL VENOUS BLD VENIPUNCTURE: CPT

## 2023-03-05 PROCEDURE — 99284 EMERGENCY DEPT VISIT MOD MDM: CPT

## 2023-03-05 ASSESSMENT — ENCOUNTER SYMPTOMS
RHINORRHEA: 0
EYE DISCHARGE: 0
ABDOMINAL DISTENTION: 0
SORE THROAT: 0
CONSTIPATION: 0
ABDOMINAL PAIN: 0
SHORTNESS OF BREATH: 0
COLOR CHANGE: 0

## 2023-03-05 ASSESSMENT — PAIN DESCRIPTION - DESCRIPTORS: DESCRIPTORS: DULL

## 2023-03-05 ASSESSMENT — LIFESTYLE VARIABLES
HOW MANY STANDARD DRINKS CONTAINING ALCOHOL DO YOU HAVE ON A TYPICAL DAY: PATIENT DOES NOT DRINK
HOW OFTEN DO YOU HAVE A DRINK CONTAINING ALCOHOL: NEVER

## 2023-03-05 ASSESSMENT — PAIN - FUNCTIONAL ASSESSMENT: PAIN_FUNCTIONAL_ASSESSMENT: 0-10

## 2023-03-05 ASSESSMENT — PAIN SCALES - GENERAL: PAINLEVEL_OUTOF10: 2

## 2023-03-05 ASSESSMENT — PAIN DESCRIPTION - LOCATION: LOCATION: CHEST

## 2023-03-05 NOTE — ED PROVIDER NOTES
[address] [facility] ED  eMERGENCY dEPARTMENT eNCOUnter      Pt Name: Ginny Dorman  MRN: [id number]  [place] 1990  Date of evaluation: 3/5/2023  Provider: Reji Hatch PA-C    CHIEF COMPLAINT       Chief Complaint   Patient presents with    Chest Pain     States that pain started on Friday         HISTORY OF PRESENT ILLNESS   (Location/Symptom, Timing/Onset,Context/Setting, Quality, Duration, Modifying Factors, Severity)  Note limiting factors. Ginny Dorman is a 34 y.o. female who presents to the emergency department complaint of midsternal chest pain which patient states been intermittent since this past Friday, 3/3/2023. Patient states around 3 PM in the morning she started having midsternal pain, she states it was persistent all day long, it did not resolve, she rated initially as a 6 out of 10, she states around 8 PM in the evening she did take 1 nitroglycerin, and states her pain went away. She states she was pain-free for the remainder of the evening was able to sleep, she states when she woke up again Saturday morning and had a recurrence of pain which was again persistent all day long, but did resolve on its own. She states around 8 AM this morning she had a recurrence of pain, she states is progressively declined throughout the day and is almost totally gone but is not totally gone, rating it as a 2 out of 10 at this time. Patient states she did not attend work since Friday, and after discussing this with her boss, they advised her she needed to come to the emerged department to be evaluated and provided a work note for missing the last 3 days at work. Patient denies any shortness of breath, no nausea vomiting, no fever, no cough. She denies any increasing pain with movement or deep inspiration. Past medical history of per patient and chart review asthma, kidney stones, postpartum depression, encephalopathy, anemia, chronic migraine headaches, angina.   Patient states her cardiologist is Dr. Jonas Diallo, she states she has not seen him in approximately 2 years. Patient states her pain is minimal, and does not want a thing for pain control at this time. HPI    NursingNotes were reviewed. REVIEW OF SYSTEMS    (2-9 systems for level 4, 10 or more for level 5)     Review of Systems   Constitutional:  Negative for activity change and appetite change. HENT:  Negative for congestion, ear discharge, ear pain, nosebleeds, rhinorrhea and sore throat. Eyes:  Negative for discharge. Respiratory:  Negative for shortness of breath. Cardiovascular:  Positive for chest pain. Negative for palpitations and leg swelling. Gastrointestinal:  Negative for abdominal distention, abdominal pain and constipation. Genitourinary:  Negative for difficulty urinating and dysuria. Musculoskeletal:  Negative for arthralgias. Skin:  Negative for color change. Neurological:  Negative for dizziness, syncope, numbness and headaches. Psychiatric/Behavioral:  Negative for agitation and confusion. Except as noted above the remainder of the review of systems was reviewed and negative.        PAST MEDICAL HISTORY     Past Medical History:   Diagnosis Date    Anemia     Congenital anomaly of corpus callosum (HCC)     Encephalopathy     Intractable migraine without aura and without status migrainosus 2017    Kidney stones     Micrognathia     Postpartum depression 2018    Precordial pain 2019    Unspecified asthma(493.90)          SURGICALHISTORY       Past Surgical History:   Procedure Laterality Date     SECTION      DILATION AND CURETTAGE OF UTERUS N/A 2019    SUCTION DILATATION AND CURETTAGE performed by Sylvia Green MD at [facility], VAGINAL N/A 2020    [facility], BILATERAL SALPINGECTOMY performed by Sylvia Green MD at [facility]      laparascopic sx    TONSILLECTOMY           CURRENT MEDICATIONS       Discharge Medication List as of 3/5/2023  3:05 PM        CONTINUE these medications which have NOT CHANGED    Details   propranolol (INDERAL LA) 60 MG extended release capsule TAKE 1 CAPSULE BY MOUTH EVERY DAY, Disp-90 capsule, R-0Normal      nitroGLYCERIN (NITROSTAT) 0.4 MG SL tablet Place 1 tablet under the tongue every 5 minutes as needed for Chest pain, Disp-25 tablet, R-0Normal      ranolazine (RANEXA) 500 MG extended release tablet TAKE 1 TABLET BY MOUTH TWICE A DAY, Disp-60 tablet, R-1Normal      albuterol sulfate  (90 Base) MCG/ACT inhaler Inhale 2 puffs into the lungs 2 times daily, Disp-2 Inhaler, R-0Normal      albuterol (ACCUNEB) 1.25 MG/3ML nebulizer solution Inhale 3 mLs into the lungs every 4 hours as needed for Wheezing, Disp-120 vial, R-0Normal      brompheniramine-pseudoephedrine-DM 2-30-10 MG/5ML syrup Take 5 mLs by mouth 4 times daily as needed for Congestion or Cough, Disp-180 mL, R-0Normal      topiramate (TOPAMAX) 50 MG tablet TAKE 1 TABLET BY MOUTH TWICE A DAY, Disp-180 tablet, R-1Normal      ondansetron (ZOFRAN) 8 MG tablet Take 1 tablet by mouth every 8 hours as needed for Nausea or Vomiting, Disp-40 tablet, R-0Normal      Rimegepant Sulfate (NURTEC) 75 MG TBDP Take 1 tablet orally once as needed for migraine headache., Disp-30 tabletHistorical Med      fluocinolone (SYNALAR) 0.025 % cream Apply topically 2 times daily. , Disp-60 g, R-0, Normal      omeprazole (PRILOSEC) 20 MG delayed release capsule Take 1 capsule by mouth every morning (before breakfast), Disp-90 capsule, R-1Normal      betamethasone dipropionate (DIPROLENE) 0.05 % ointment Apply topically daily. , Disp-50 g, R-0, Normal      clobetasol (TEMOVATE) 0.05 % ointment Apply topically 2 times daily. , Disp-1 Tube, R-2, Normal      BD INSULIN SYRINGE U/F 31G X 5/16\" 0.5 ML MISC USE AS DIRECTED WITH SUMATRIPTAN, DAWHistorical Med      simethicone (MYLICON) 80 MG chewable tablet Take 1 tablet by mouth 4 times daily as needed for Flatulence, Disp-180 tablet, R-1Normal      metoclopramide (REGLAN) 10 MG tablet Take 1 tablet by mouth every 6 hours as needed (N/V), Disp-30 tablet, R-0Normal                  Acyclovir and related, Ibuprofen, Morphine, Pcn [penicillins], Amoxicillin, Cephalexin, Macrobid [nitrofurantoin macrocrystal], Hydrocodone-acetaminophen, and Oxycodone-acetaminophen    FAMILY HISTORY       Family History   Problem Relation Age of Onset    Asthma Mother     High Blood Pressure Mother     Asthma Father     Heart Disease Maternal Grandmother     Asthma Maternal Grandmother     Diabetes Maternal Grandmother     Kidney Disease Maternal Grandmother     Other Maternal Grandmother     Cancer Paternal Grandfather           SOCIAL HISTORY       Social History     Socioeconomic History    Marital status:      Spouse name: None    Number of children: None    Years of education: None    Highest education level: None   Tobacco Use    Smoking status: Light Smoker     Packs/day: 0.25     Years: 20.00     Pack years: 5.00     Types: Cigarettes     Start date: 1/19/1997    Smokeless tobacco: Never   Vaping Use    Vaping Use: Never used   Substance and Sexual Activity    Alcohol use: No     Alcohol/week: 0.0 standard drinks    Drug use: No    Sexual activity: Yes     Partners: Male       SCREENINGS    [place] Coma Scale  Eye Opening: Spontaneous  Best Verbal Response: Oriented  Best Motor Response: Obeys commands  [place] Coma Scale Score: 15        PHYSICAL EXAM    (up to 7 for level 4, 8 or more for level 5)     ED Triage Vitals [03/05/23 1344]   BP Temp Temp Source Heart Rate Resp SpO2 Height Weight   112/79 98.3 °F (36.8 °C) Oral 68 18 99 % -- 105 lb (47.6 kg)       Physical Exam  Vitals and nursing note reviewed. Constitutional:       General: She is not in acute distress. Appearance: Normal appearance. She is well-developed. She is not ill-appearing, toxic-appearing or diaphoretic.       Comments: Nontoxic appearance, appears in no acute distress   HENT:      Head: Normocephalic. Nose: No congestion. Mouth/Throat:      Mouth: Mucous membranes are moist.      Pharynx: No oropharyngeal exudate or posterior oropharyngeal erythema. Eyes:      Extraocular Movements: Extraocular movements intact. Conjunctiva/sclera: Conjunctivae normal.      Pupils: Pupils are equal, round, and reactive to light. Neck:      Vascular: No JVD. Trachea: No tracheal deviation. Cardiovascular:      Rate and Rhythm: Normal rate. Pulses: Normal pulses. Heart sounds: Normal heart sounds. No murmur heard. No friction rub. No gallop. Pulmonary:      Effort: Pulmonary effort is normal. No tachypnea, accessory muscle usage, respiratory distress or retractions. Breath sounds: Normal breath sounds. No stridor. No wheezing, rhonchi or rales. Comments: Lung sounds are clear in all fields, there is no wheezes rales or rhonchi, no excess muscle use, no retractions, room air saturations are 99%, pain is not reproducible by deep inspiration, or by palpation. Chest:      Chest wall: No tenderness. Abdominal:      General: Abdomen is flat. Bowel sounds are normal. There is no distension or abdominal bruit. Palpations: There is no shifting dullness, fluid wave, hepatomegaly, splenomegaly, mass or pulsatile mass. Tenderness: There is no abdominal tenderness. There is no right CVA tenderness, left CVA tenderness, guarding or rebound. Negative signs include Mcneil's sign, Rovsing's sign and McBurney's sign. Musculoskeletal:         General: No deformity. Cervical back: Normal range of motion and neck supple. No rigidity. Skin:     General: Skin is warm and dry. Capillary Refill: Capillary refill takes less than 2 seconds. Coloration: Skin is not jaundiced. Neurological:      General: No focal deficit present. Mental Status: She is alert and oriented to person, place, and time.  Mental status is at baseline. Cranial Nerves: No cranial nerve deficit. Sensory: No sensory deficit. Motor: No weakness. Coordination: Coordination normal.   Psychiatric:         Mood and Affect: Mood normal.       RESULTS     EKG: All EKG's are interpreted by the Emergency Department Physician who either signs or Co-signsthis chart in the absence of a cardiologist.    EKG shows sinus rhythm 61 bpm short SC interval no acute ST segment abnormality no ventricular ectopy QTc 414 ms    RADIOLOGY:   Non-plain filmimages such as CT, Ultrasound and MRI are read by the radiologist. Plain radiographic images are visualized and preliminarily interpreted by the emergency physician with the below findings:        Interpretation per the Radiologist below, if available at the time ofthis note:    XR CHEST PORTABLE   Final Result   No acute process. ED BEDSIDE ULTRASOUND:   Performed by ED Physician - none    LABS:  Labs Reviewed   CBC WITH AUTO DIFFERENTIAL - Abnormal; Notable for the following components:       Result Value    MCV 97.1 (*)     MCH 32.6 (*)     All other components within normal limits   COMPREHENSIVE METABOLIC PANEL - Abnormal; Notable for the following components:    Glucose 104 (*)     All other components within normal limits   TROPONIN       All other labs were within normal range or not returned as of this dictation. EMERGENCY DEPARTMENT COURSE and DIFFERENTIAL DIAGNOSIS/MDM:   Vitals:    Vitals:    03/05/23 1344   BP: 112/79   Pulse: 68   Resp: 18   Temp: 98.3 °F (36.8 °C)   TempSrc: Oral   SpO2: 99%   Weight: 105 lb (47.6 kg)          Medical Decision Making  Presented to the ED with midsternal chest pain which she states started on Friday, 3/3/2023 around 3 PM , states the following morning, she had ongoing chest pain which she states did not resolve. She states she decided come to the emergency room to be evaluated at the request of her boss, as she has missed multiple days of work. She states that last evening around 8 PM she did have chest pain again she took 1 nitroglycerin, with total relief of pain, she has not had any recurrent pain since that time. Evaluation number department she has 0 pain, chest x-ray shows no acute pulmonary process, EKG sinus rhythm at 61 bpm there is a short NM interval, there is no ventricular ectopy, QTc is 414 ms. White count is 8.7 thousand. Troponin is 0.010. Patient was discharged with diagnosis nonspecific chest pain, she was advised to contact her regular family provider, and her cardiologist should she have any worsening or change in pain, she was advised to return to the ED. Amount and/or Complexity of Data Reviewed  Labs: ordered. Radiology: ordered. ECG/medicine tests: ordered. Coding     CONSULTS:  None    PROCEDURES:  Unless otherwise noted below, none     Procedures    FINAL IMPRESSION      1.  Chest pain, unspecified type          DISPOSITION/PLAN   DISPOSITION Decision To Discharge 03/05/2023 02:57:35 PM      PATIENT REFERRED TO:  Alcon Mcghee 69 [phone]    In 2 days      [place] [phone]    In 3 days      DISCHARGE MEDICATIONS:  Discharge Medication List as of 3/5/2023  3:05 PM             (Please note that portions of this note were completed with a voice recognition program.  Efforts were made to edit the dictations but occasionally words are mis-transcribed.)    Marialuisa Lim PA-C (electronically signed)  Attending Emergency Physician         Marialuisa Lim PA-C  03/09/23 [id number]

## 2023-03-05 NOTE — Clinical Note
Dagmar De Santiago was seen and treated in our emergency department on 3/5/2023. She may return to work on 03/06/2023. If you have any questions or concerns, please don't hesitate to call.       Felix Martinez PA-C

## 2023-03-05 NOTE — ED TRIAGE NOTES
States that she has a history of angina. States that she began to have chest pain on Friday. States that she took Nitro with relief. States pain has resolved to a minor irritation. States that she needs a work note.

## 2023-03-08 ENCOUNTER — TELEPHONE (OUTPATIENT)
Dept: CARDIOLOGY CLINIC | Age: 33
End: 2023-03-08

## 2023-03-08 NOTE — TELEPHONE ENCOUNTER
Patient was seen in er for CP. States she has been taking her medications and states it doesn't seem to be helping angina. Scheduled appointment with craig for Monday but would like to know what to do until then.  Please advise

## 2023-03-10 LAB
EKG ATRIAL RATE: 61 BPM
EKG P AXIS: 67 DEGREES
EKG P-R INTERVAL: 110 MS
EKG Q-T INTERVAL: 412 MS
EKG QRS DURATION: 100 MS
EKG QTC CALCULATION (BAZETT): 414 MS
EKG R AXIS: 64 DEGREES
EKG T AXIS: 48 DEGREES
EKG VENTRICULAR RATE: 61 BPM

## 2023-03-13 ENCOUNTER — OFFICE VISIT (OUTPATIENT)
Dept: CARDIOLOGY CLINIC | Age: 33
End: 2023-03-13
Payer: COMMERCIAL

## 2023-03-13 VITALS
WEIGHT: 99.63 LBS | DIASTOLIC BLOOD PRESSURE: 64 MMHG | HEART RATE: 70 BPM | SYSTOLIC BLOOD PRESSURE: 110 MMHG | BODY MASS INDEX: 20.12 KG/M2

## 2023-03-13 DIAGNOSIS — R07.9 CHEST PAIN AT REST: ICD-10-CM

## 2023-03-13 DIAGNOSIS — R06.09 DOE (DYSPNEA ON EXERTION): ICD-10-CM

## 2023-03-13 DIAGNOSIS — R06.02 SHORTNESS OF BREATH: ICD-10-CM

## 2023-03-13 DIAGNOSIS — R07.2 PRECORDIAL PAIN: Primary | ICD-10-CM

## 2023-03-13 PROCEDURE — G8427 DOCREV CUR MEDS BY ELIG CLIN: HCPCS | Performed by: NURSE PRACTITIONER

## 2023-03-13 PROCEDURE — G8484 FLU IMMUNIZE NO ADMIN: HCPCS | Performed by: NURSE PRACTITIONER

## 2023-03-13 PROCEDURE — 4004F PT TOBACCO SCREEN RCVD TLK: CPT | Performed by: NURSE PRACTITIONER

## 2023-03-13 PROCEDURE — 99213 OFFICE O/P EST LOW 20 MIN: CPT | Performed by: NURSE PRACTITIONER

## 2023-03-13 PROCEDURE — G8420 CALC BMI NORM PARAMETERS: HCPCS | Performed by: NURSE PRACTITIONER

## 2023-03-13 PROCEDURE — 93000 ELECTROCARDIOGRAM COMPLETE: CPT | Performed by: NURSE PRACTITIONER

## 2023-03-13 ASSESSMENT — ENCOUNTER SYMPTOMS
CHOKING: 0
EYE PAIN: 0
PHOTOPHOBIA: 0
EYE REDNESS: 0
ALLERGIC/IMMUNOLOGIC NEGATIVE: 1
EYE ITCHING: 0
CHEST TIGHTNESS: 1
SINUS PAIN: 0
RHINORRHEA: 0
SINUS PRESSURE: 0
BLOOD IN STOOL: 0
APNEA: 0
EYE DISCHARGE: 0
STRIDOR: 0
CONSTIPATION: 0
COLOR CHANGE: 0
COUGH: 0
VOICE CHANGE: 0
SHORTNESS OF BREATH: 1
ABDOMINAL DISTENTION: 0
SORE THROAT: 0
ABDOMINAL PAIN: 0
TROUBLE SWALLOWING: 0
WHEEZING: 0
BACK PAIN: 0
VOMITING: 0
DIARRHEA: 0
FACIAL SWELLING: 0
NAUSEA: 0

## 2023-03-13 NOTE — PROGRESS NOTES
Patient: Johanna Simpson  YOB: 1990  MRN: [id number]    Chief Complaint:   Chief Complaint   Patient presents with    Chest Pain         Subjective/HPI   3/13/2023: Patient with past medical history significant for asthma, kidney stones, postpartum depression, encephalopathy, anemia, chronic migraines, angina. Been seen in this office since 2019. Patient had negative stress echo in 2020. Patient seen in office today for hospital discharge follow-up. Patient was recently seen in the emergency department with complaints of midsternal chest pain. Patient reports intermittent episodes of chest pain. Reports recently she was sitting on the couch and experienced a sudden, sharp, electricity type chest pain on the left side of her chest that went straight through to her back. Symptoms lasted for a few seconds and then spontaneously resolved. Patient has taken nitro a few times in the past few months for chest pain. She also reports feeling very short of breath with minimal exertion. States she feels short of breath while talking to me in the office today. No conversational dyspnea noted. Pt denies  nausea, vomiting, diarrhea, constipation, motor weakness, insomnia, weight loss, syncope, dizziness, lightheadedness, palpitations, PND, orthopnea, or claudication. No bleeding issues. Pt denies any CHF type symptoms. No recent hospitalizations. Pt is compliant with all Rx medications. Blood pressure and heart rate are under control.          Allergies   Allergen Reactions    Acyclovir And Related Swelling    Ibuprofen Hives    Morphine Itching    Pcn [Penicillins] Anaphylaxis and Hives    Amoxicillin Hives and Swelling     Throat swelling    Cephalexin Hives    Macrobid [Nitrofurantoin Macrocrystal] Hives    Hydrocodone-Acetaminophen Itching    Oxycodone-Acetaminophen Itching       Current Outpatient Medications   Medication Sig Dispense Refill    propranolol (INDERAL LA) 60 MG extended release capsule TAKE 1 CAPSULE BY MOUTH EVERY DAY 90 capsule 0    nitroGLYCERIN (NITROSTAT) 0.4 MG SL tablet Place 1 tablet under the tongue every 5 minutes as needed for Chest pain 25 tablet 0    ranolazine (RANEXA) 500 MG extended release tablet TAKE 1 TABLET BY MOUTH TWICE A DAY 60 tablet 1    albuterol sulfate  (90 Base) MCG/ACT inhaler Inhale 2 puffs into the lungs 2 times daily 2 Inhaler 0    albuterol (ACCUNEB) 1.25 MG/3ML nebulizer solution Inhale 3 mLs into the lungs every 4 hours as needed for Wheezing 120 vial 0    brompheniramine-pseudoephedrine-DM 2-30-10 MG/5ML syrup Take 5 mLs by mouth 4 times daily as needed for Congestion or Cough 180 mL 0    topiramate (TOPAMAX) 50 MG tablet TAKE 1 TABLET BY MOUTH TWICE A  tablet 1    ondansetron (ZOFRAN) 8 MG tablet Take 1 tablet by mouth every 8 hours as needed for Nausea or Vomiting 40 tablet 0    Rimegepant Sulfate (NURTEC) 75 MG TBDP Take 1 tablet orally once as needed for migraine headache. 30 tablet     fluocinolone (SYNALAR) 0.025 % cream Apply topically 2 times daily. 60 g 0    omeprazole (PRILOSEC) 20 MG delayed release capsule Take 1 capsule by mouth every morning (before breakfast) 90 capsule 1    betamethasone dipropionate (DIPROLENE) 0.05 % ointment Apply topically daily. 50 g 0    clobetasol (TEMOVATE) 0.05 % ointment Apply topically 2 times daily. 1 Tube 2    BD INSULIN SYRINGE U/F 31G X 5/16\" 0.5 ML MISC USE AS DIRECTED WITH SUMATRIPTAN      simethicone (MYLICON) 80 MG chewable tablet Take 1 tablet by mouth 4 times daily as needed for Flatulence 180 tablet 1    metoclopramide (REGLAN) 10 MG tablet Take 1 tablet by mouth every 6 hours as needed (N/V) 30 tablet 0     No current facility-administered medications for this visit.        Past Medical History:   Diagnosis Date    Anemia     Congenital anomaly of corpus callosum (HCC)     Encephalopathy     Intractable migraine without aura and without status migrainosus 11/24/2017    Kidney stones Micrognathia     Postpartum depression 2018    Precordial pain 2019    Unspecified asthma(493.90)        Past Surgical History:   Procedure Laterality Date     SECTION      DILATION AND CURETTAGE OF UTERUS N/A 2019    SUCTION DILATATION AND CURETTAGE performed by Jasmin Vergara MD at [address] [facility], VAGINAL N/A 2020    TL, BILATERAL SALPINGECTOMY performed by Jasmin Vergara MD at [facility] [address]      laparascopic sx    TONSILLECTOMY         Social History     Socioeconomic History    Marital status:    Tobacco Use    Smoking status: Light Smoker     Packs/day: 0.25     Years: 20.00     Pack years: 5.00     Types: Cigarettes     Start date: 1997    Smokeless tobacco: Never   Vaping Use    Vaping Use: Never used   Substance and Sexual Activity    Alcohol use: No     Alcohol/week: 0.0 standard drinks    Drug use: No    Sexual activity: Yes     Partners: Male       Family History   Problem Relation Age of Onset    Asthma Mother     High Blood Pressure Mother     Asthma Father     Heart Disease Maternal Grandmother     Asthma Maternal Grandmother     Diabetes Maternal Grandmother     Kidney Disease Maternal Grandmother     Other Maternal Grandmother     Cancer Paternal Grandfather          Review of Systems:   Review of Systems   Constitutional:  Negative for chills, diaphoresis, fatigue and fever. HENT:  Negative for congestion, dental problem, drooling, ear discharge, ear pain, facial swelling, hearing loss, mouth sores, nosebleeds, postnasal drip, rhinorrhea, sinus pressure, sinus pain, sneezing, sore throat, tinnitus, trouble swallowing and voice change. Eyes:  Negative for photophobia, pain, discharge, redness, itching and visual disturbance. Respiratory:  Positive for chest tightness and shortness of breath. Negative for apnea, cough, choking, wheezing and stridor.     Cardiovascular:  Negative for chest pain, palpitations and leg swelling. Gastrointestinal:  Negative for abdominal distention, abdominal pain, blood in stool, constipation, diarrhea, nausea and vomiting. Endocrine: Negative. Genitourinary: Negative. Negative for decreased urine volume, difficulty urinating, dysuria, flank pain, frequency and hematuria. Musculoskeletal: Negative. Negative for arthralgias, back pain, gait problem, joint swelling, myalgias, neck pain and neck stiffness. Skin: Negative. Negative for color change, rash and wound. Allergic/Immunologic: Negative. Neurological: Negative. Negative for dizziness, tremors, seizures, syncope, facial asymmetry, speech difficulty, weakness, light-headedness, numbness and headaches. Psychiatric/Behavioral: Negative. Negative for confusion. All other systems reviewed and are negative. Physical Examination:    /64 (Site: Right Upper Arm, Position: Sitting, Cuff Size: Small Adult)   Pulse 70   Wt 99 lb 10.1 oz (45.2 kg)   LMP 01/03/2020   BMI 20.12 kg/m²    Physical Exam  Vitals and nursing note reviewed. Constitutional:       General: She is not in acute distress. Appearance: Normal appearance. She is normal weight. She is not ill-appearing, toxic-appearing or diaphoretic. HENT:      Head: Normocephalic and atraumatic. Right Ear: Tympanic membrane, ear canal and external ear normal.      Left Ear: Tympanic membrane, ear canal and external ear normal.      Nose: Nose normal. No congestion or rhinorrhea. Mouth/Throat:      Mouth: Mucous membranes are moist.      Pharynx: Oropharynx is clear. No oropharyngeal exudate or posterior oropharyngeal erythema. Eyes:      Extraocular Movements: Extraocular movements intact. Conjunctiva/sclera: Conjunctivae normal.      Pupils: Pupils are equal, round, and reactive to light. Cardiovascular:      Rate and Rhythm: Normal rate and regular rhythm. Pulses: Normal pulses. Heart sounds: Normal heart sounds.    Pulmonary: Effort: Pulmonary effort is normal. No respiratory distress. Breath sounds: Normal breath sounds. Abdominal:      General: Bowel sounds are normal.      Palpations: Abdomen is soft. Tenderness: There is no abdominal tenderness. There is no guarding or rebound. Musculoskeletal:         General: Normal range of motion. Cervical back: Normal range of motion and neck supple. No tenderness. Skin:     General: Skin is warm and dry. Capillary Refill: Capillary refill takes less than 2 seconds. Findings: No rash. Neurological:      General: No focal deficit present. Mental Status: She is alert and oriented to person, place, and time. Cranial Nerves: No cranial nerve deficit. Sensory: No sensory deficit. Motor: No weakness. Gait: Gait normal.   Psychiatric:         Mood and Affect: Mood normal.         Behavior: Behavior normal.         Thought Content:  Thought content normal.         Judgment: Judgment normal.       LABS:  CBC:   Lab Results   Component Value Date/Time    WBC 9.7 03/05/2023 02:15 PM    RBC 4.28 03/05/2023 02:15 PM    HGB 14.0 03/05/2023 02:15 PM    HCT 41.6 03/05/2023 02:15 PM    MCV 97.1 03/05/2023 02:15 PM    MCH 32.6 03/05/2023 02:15 PM    MCHC 33.5 03/05/2023 02:15 PM    RDW 13.7 03/05/2023 02:15 PM     03/05/2023 02:15 PM    MPV 10.2 10/07/2014 03:04 PM     Lipids:  Lab Results   Component Value Date    CHOL 119 08/07/2018     Lab Results   Component Value Date    TRIG 151 08/07/2018     Lab Results   Component Value Date    HDL 61 (H) 08/07/2018     Lab Results   Component Value Date    LDLCALC 28 08/07/2018     No results found for: LABVLDL, VLDL  No results found for: CHOLHDLRATIO  CMP:    Lab Results   Component Value Date/Time     03/05/2023 02:15 PM    K 3.9 03/05/2023 02:15 PM    CL 99 03/05/2023 02:15 PM    CO2 27 03/05/2023 02:15 PM    BUN 8 03/05/2023 02:15 PM    CREATININE 0.67 03/05/2023 02:15 PM    GFRAA >60.0 2022 05:15 AM    LABGLOM >60.0 2023 02:15 PM    GLUCOSE 104 2023 02:15 PM    PROT 7.7 2023 02:15 PM    LABALBU 4.6 2023 02:15 PM    CALCIUM 9.9 2023 02:15 PM    BILITOT <0.2 2023 02:15 PM    ALKPHOS 54 2023 02:15 PM    AST 15 2023 02:15 PM    ALT 14 2023 02:15 PM     BMP:    Lab Results   Component Value Date/Time     2023 02:15 PM    K 3.9 2023 02:15 PM    CL 99 2023 02:15 PM    CO2 27 2023 02:15 PM    BUN 8 2023 02:15 PM    LABALBU 4.6 2023 02:15 PM    CREATININE 0.67 2023 02:15 PM    CALCIUM 9.9 2023 02:15 PM    GFRAA >60.0 2022 05:15 AM    LABGLOM >60.0 2023 02:15 PM    GLUCOSE 104 2023 02:15 PM     Magnesium:    Lab Results   Component Value Date/Time    MG 2.1 2020 04:08 PM     TSH:  Lab Results   Component Value Date    TSH 1.610 2018     . result  No results for input(s): PROBNP in the last 72 hours. No results for input(s): INR in the last 72 hours.       Patient Active Problem List   Diagnosis    Anemia affecting pregnancy     contractions    34 weeks gestation of pregnancy    Leakage of amniotic fluid     (vaginal birth after )    Previous  delivery affecting pregnancy    Supervision of normal pregnancy    Intractable migraine without aura and without status migrainosus    Heart palpitations    Chronic diarrhea of unknown origin    Back pain affecting pregnancy in second trimester    Pelvic pain affecting pregnancy in third trimester, antepartum    Abnormal glucose tolerance in mother complicating pregnancy    Anxiety    Postpartum depression    Patient underweight    , missed    Precordial pain    Abnormal uterine bleeding (AUB)    Encephalopathy    Headache    Tension headache    Muscle cramps    Ataxic gait    Cervicalgia    Generalized pruritus    Chest pain at rest    MALAGON (dyspnea on exertion)       Assessment   Diagnosis Orders   1. Precordial pain  EKG 12 Lead    ECHO Complete 2D W Doppler W Color    Stress test nuclear      2. Shortness of breath  ECHO Complete 2D W Doppler W Color    Stress test nuclear      3. Chest pain at rest        4. MALAGON (dyspnea on exertion)            Orders Placed This Encounter   Procedures    Stress test nuclear     Standing Status:   Future     Standing Expiration Date:   3/13/2024     Scheduling Instructions:      Pt states she gets extremely SOB with minimal exertion, states she cannot walk/run on a treadmill     Order Specific Question:   Reason for Exam?     Answer:   Chest pain     Order Specific Question:   Does patient have left bundle branch block (LBBB) or left ventricular hypertrophy (LVH) based on resting ECG, a ventricular pacemaker, or is unable to exercise on a treadmill based on physical or mental limitations? Answer:   Yes    EKG 12 Lead     Order Specific Question:   Reason for Exam?     Answer:   Chest pain    ECHO Complete 2D W Doppler W Color     Standing Status:   Future     Standing Expiration Date:   3/12/2024     Order Specific Question:   Reason for exam:     Answer:   CP, SOB      No orders of the defined types were placed in this encounter. Return for follow up with Jose Angel Shi CNP. Plan  Follow up with PCP for labs. Non invasive cardiac testing will be ordered to further evaluate for any ischemic or structural heart disease as a cause of the patient symptoms. We will proceed with a Cardiolite stress test and echo      Continue with current cardiac medications. We will need to continue to monitor muscle and liver enzymes, BUN, CR, and electrolytes. The nature of cardiac risk has been fully discussed with this patient. I have made him aware of his LDL target goal given his cardiovascular risk analysis. I have discussed the appropriate diet. The need for lifelong compliance in order to reduce risk is stressed.  A regular exercise program is recommended to help achieve and maintain normal body weight, fitness and improve lipid balance. Details of medical condition explained and patient was warned about adverse consequences of uncontrolled medical conditions and possible side effects of prescribed medications. Patient was advised and encouraged to check blood pressure at home or at a pharmacy, maintain a logbook, and also call us back if blood pressures are above or below the target ranges. Patient clearly understands and agrees to these instructions. Counseling: The patient has been advised to contact us if they experience chest pain, palpitations, progressive SOB, orthopnea, paroxysmal nocturnal dyspnea, or progressive edema.

## 2023-03-13 NOTE — PATIENT INSTRUCTIONS
Transthoracic Echocardiogram: About This Test  What is it? An echocardiogram (also called an echo) uses sound waves to make an image of your heart. A device called a transducer sends sound waves that echo off your heart and back to the transducer. These echoes are turned into moving pictures of your heart that can be seen on a video screen. In a transthoracic echocardiogram (TTE), the transducer is moved across your chest or belly. A TTE is the most common type of echocardiogram.  Why is this test done? This test is done to check your heart health. It's used for many reasons. For example, it may be done to:  Check a heart murmur. Look for the cause of shortness of breath or unexplained chest pain or pressure. Check how well your heart is pumping blood. Check to see how well your heart valves are working. Look for blood clots inside your heart. Measure the size and shape of the heart's chambers. Measure the blood pressure and speed of blood flow through the heart. How is the test done? You may be asked to remove your clothes above your waist. You may be given a cloth or paper covering to use during the test.  You will lie on your back or on your left side on a bed or table. You may receive medicine through a vein (intravenously, or IV). The IV can be used to give you a contrast material. This helps your doctor get good views of your heart. Small pads or patches (electrodes) will be placed on the skin of your chest to record your heart rate during the test.  A small amount of gel will be rubbed on the side of your chest to help  the sound waves. The transducer will be pressed firmly against your chest and moved slowly back and forth. It is usually moved to different areas on your chest or belly to get specific views of your heart. You will be asked to do several things, such as hold very still, breathe in and out very slowly, hold your breath, or lie on your left side.   When the test is over, the gel is wiped off and the electrodes are removed. What are the risks of the test?  There are no known risks from having this test.  No electricity passes through your body during the test. There is no danger of getting an electrical shock. You do not receive any radiation. What happens after the test?  You will probably be able to go home right away. It depends on the reason for the test.  You can go back to your usual activities right away. Follow-up care is a key part of your treatment and safety. Be sure to make and go to all appointments, and call your doctor if you are having problems. It's also a good idea to keep a list of the medicines you take. Ask your doctor when you can expect to have your test results. Current as of: April 29, 2021               Content Version: 13.0  © 2006-2021 [employer]. Care instructions adapted under license by [address] [place] [address]. If you have questions about a medical condition or this instruction, always ask your healthcare professional. [place] [address] any warranty or liability for your use of this information. Cardiac Perfusion Scan: About This Test  What is it? A cardiac perfusion scan measures the amount of blood in your heart muscle at rest and after your heart has been made to work hard. Medicine or exercise can be used to increase the amount of blood that your heart needs. During the scan, a camera takes pictures of your heart after a radioactive tracer is put into a vein in your arm. The tracer travels through the blood and into your heart. As the tracer moves through your heart, areas that have good blood flow absorb the tracer. Areas that don't absorb the tracer may not be getting enough blood or may have been damaged by a heart attack. The pictures show the difference. Two sets of pictures may be made during the test. One set is taken while you are resting.  Another set is taken after your heart has been made to work lucero (called a stress test). Why is this test done? The test is often done to find out what may be causing chest pain or pressure. It may be done after a heart attack to see if areas of the heart aren't getting enough blood. It also may be used to find out how much your heart has been damaged from the heart attack. How do you prepare for the test?  Tell your doctor ALL the medicines, vitamins, supplements, and herbal remedies you take. Some may increase the risk of problems during your test. Your doctor will tell you if you should stop taking any of them before the test and how soon to do it. If you take aspirin or some other blood thinner, ask your doctor if you should stop taking it before your test. Make sure that you understand exactly what your doctor wants you to do. These medicines increase the risk of bleeding. You may be told not to eat or drink for several hours before the scan. You may be told to avoid alcohol, tobacco, and drinks that have caffeine for at least 24 hours before the test.  Wear comfortable shoes, such as running shoes, and loose shorts or pants. Don't wear jewelry to the test.  If you are breastfeeding, you may want to pump enough breast milk before the test to get through 1 to 2 days of feeding. The radioactive tracer used in this test can get into your breast milk and is not good for the baby. How is the test done? A cardiac perfusion test can be done while you're resting, after you exercise, or after you take medicine. Or you could have the test after taking medicine and exercising. Before the scans, electrodes will be attached to your chest to help record your heartbeats. You will have a tube, called an IV, put into your arm. Radioactive tracer will be put in the IV. For the resting scan, you will lie on a table. A camera above your chest records the tracer that has moved from your blood into your heart muscle. Several scans will be taken.  They take 10 to 30 minutes each.  For an exercise scan, you will walk on a treadmill or pedal a stationary bike. Then you have the scan. For a medicine scan, you are given a medicine in your IV that increases the amount of blood that your heart needs. Then you have the scan. How long does a cardiac perfusion scan take? Each scan may take about 30 to 60 minutes. How long the test takes will depend on how many scans you have and how long you wait between scans. What are the risks of a cardiac perfusion scan? Cardiac perfusion scans are usually safe. Anytime you're exposed to radiation, there's a small chance of damage to cells or tissue. That's the case even with the low-level radioactive tracer used for this test. But the chance of damage is very low compared with the benefits of the test.  There will be some risks when the test uses exercise or medicine to stress your heart. The amount of risk depends on the condition of your heart and your general level of health. The risks include:  Fainting. Chest pain. An irregular heartbeat. Heart attack. There is a slight risk that death may result if a heart attack occurs during the test.  What happens after the test?  You can go home and back to your usual activities right away, unless you are already admitted to the hospital.  How can you care for yourself at home? Drink plenty of fluids for the next 24 hours to help flush the tracer out of your body. If you have kidney, heart, or liver disease and have to limit fluids, talk with your doctor before you increase the amount of fluids you drink. Most of the tracer will leave your body through your urine or stool within a day. So be sure to flush the toilet right after you use it, and wash your hands well with soap and water. The amount of radiation in the tracer is very small.  This means it isn't a risk for people to be around you after the test.  Do not breastfeed your baby for 1 or 2 days after this test. During this time, you can give your baby breast milk you stored before the test, or you can give formula. When should you call for help? Call 911 anytime you think you may need emergency care. For example, call if:  You passed out (lost consciousness). You have been diagnosed with angina, and you have angina symptoms that do not go away with rest or are not getting better within 5 minutes after you take a dose of nitroglycerin. You have symptoms of a heart attack. These may include:  Chest pain or pressure, or a strange feeling in the chest.  Sweating. Shortness of breath. Nausea or vomiting. Pain, pressure, or a strange feeling in the back, neck, jaw, or upper belly or in one or both shoulders or arms. Lightheadedness or sudden weakness. A fast or irregular heartbeat. After you call 911, the  may tell you to chew 1 adult-strength or 2 to 4 low-dose aspirin. Wait for an ambulance. Do not try to drive yourself. Watch closely for changes in your health, and be sure to contact your doctor if you have any problems. Follow-up care is a key part of your treatment and safety. Be sure to make and go to all appointments, and call your doctor if you are having problems. It's also a good idea to keep a list of the medicines you take. Ask your doctor when you can expect to have your test results. Where can you learn more? Go to https://chpeivaneweb.YesGraph. org and sign in to your [employer] account. Enter T239 in the [facility] box to learn more about \"Cardiac Perfusion Scan: About This Test.\"          Cardiac Perfusion Scan (Medicine): About This Test  What is it? A cardiac perfusion scan measures the amount of blood in your heart muscle at rest and after your heart has been made to work hard. Either medicine or exercise can be used to stress the heart.  This information is about using medicine for this test.  During the scan, a camera takes pictures of your heart after a radioactive tracer is injected into a vein in your arm. The tracer travels through the blood and into your heart. As the tracer moves through your heart, areas that have good blood flow absorb the tracer. Areas that don't absorb the tracer may not be getting enough blood or may have been damaged by a heart attack. The pictures show this difference. Two sets of pictures may be made during the test. One set is taken while you are resting. Another set is taken after your heart has been made to work harder (called a stress test). Why is this test done? The test is often done to find out what may be causing chest pain or pressure. It may be done after a heart attack to see if areas of the heart are not getting enough blood or to find out how much your heart has been damaged from the heart attack. How do you prepare for the test?  Tell your doctor ALL the medicines, vitamins, supplements, and herbal remedies you take. Some may increase the risk of problems during your test. Your doctor will tell you if you should stop taking any of them before the test and how soon to do it. If you take aspirin or some other blood thinner, ask your doctor if you should stop taking it before your test. Make sure that you understand exactly what your doctor wants you to do. These medicines increase the risk of bleeding. Tell your doctor if you are taking any erection-enhancing medicines (such as Cialis, Levitra, or Viagra). You may need to take nitroglycerin during this test, which can cause a serious reaction if you have taken an erection-enhancing medicine within the previous 48 hours. Do not have any caffeine, such as coffee or tea, for 24 hours before the test.  If you are breastfeeding, you may want to pump enough breast milk before the test to get through 1 to 2 days of feeding. The radioactive tracer used in this test can get into your breast milk and is not good for the baby. How is the test done?   Resting or baseline scan  You will take your top off and be given a gown to wear. Electrodes will be attached to your chest to keep track of your heartbeats. You will have a tube, called an IV, going into your arm. A small amount of the radioactive tracer will be put in the IV. You will lie on your back or your stomach on a table with a large camera positioned above your chest. The camera records the tracer's signals as it moves through your blood. You will be asked to remain very still during each scan, which takes about 5 to 10 minutes. Several scans will be taken. Stress scan using medicine  The stress scan is done in two parts. In many hospitals, you first have the resting scan. You are then given a medicine that makes your heart work harder and you have another scan. Sometimes the stress scan is done first.  You will have a test called an electrocardiogram (EKG or ECG), which takes about 5 to 10 minutes. You may have other EKGs during and after the stress test.  Medicine will be put in your IV. It will make your heart work harder. You may get a headache and feel dizzy, flushed, and nauseated from the medicine, but these symptoms usually do not last long. Your heartbeat and blood pressure may be checked. Your symptoms such as chest pain and shortness of breath will be checked. A few minutes after you get the medicine, another small amount of radioactive tracer is injected. You may be given something to reverse the medicine used to make your heart work harder. You will wait for 30 to 40 minutes and then have another resting scan. What else should you know about the test?  Sometimes more pictures are taken 2 to 4 hours after the stress scan. No electricity passes through your body during the test. There is no danger of getting an electrical shock. Anytime you're exposed to radiation, there's a small chance of damage to cells or tissue.  That's the case even with the low-level radioactive tracer used for this test. But the chance of damage is very low compared with the benefits of the test.  Most of the tracer will leave your body through your urine or stool within a day. So be sure to flush the toilet right after you use it, and wash your hands well with soap and water. The amount of radiation in the tracer is very small. This means it isn't a risk for people to be around you after the test.  What happens after the test?  You will probably be able to go home right away. You can go back to your usual activities right away. When should you call for help? Call 911 anytime you think you may need emergency care. For example, call if:  You passed out (lost consciousness). You have been diagnosed with angina, and you have angina symptoms that do not go away with rest or are not getting better within 5 minutes after you take a dose of nitroglycerin. You have symptoms of a heart attack. These may include:  Chest pain or pressure, or a strange feeling in the chest.  Sweating. Shortness of breath. Nausea or vomiting. Pain, pressure, or a strange feeling in the back, neck, jaw, or upper belly or in one or both shoulders or arms. Lightheadedness or sudden weakness. A fast or irregular heartbeat. After you call 911, the  may tell you to chew 1 adult-strength or 2 to 4 low-dose aspirin. Wait for an ambulance. Do not try to drive yourself. Watch closely for changes in your health, and be sure to contact your doctor if you have any problems. Follow-up care is a key part of your treatment and safety. Be sure to make and go to all appointments, and call your doctor if you are having problems. It's also a good idea to keep a list of the medicines you take. Ask your doctor when you can expect to have your test results. Where can you learn more? Go to https://chmaximinoeb.[employer]. org and sign in to your [employer] account.  Enter R320 in the [facility] box to learn more about \"Cardiac Perfusion Scan (Medicine): About This Test.\"     If you do not have an account, please click on the \"Sign Up Now\" link. Current as of: April 29, 2021               Content Version: 13.0  © 2074-1389 [employer]. Care instructions adapted under license by [facility] ([facility]). If you have questions about a medical condition or this instruction, always ask your healthcare professional. [address] any warranty or liability for your use of this information.

## 2023-03-17 ENCOUNTER — TELEPHONE (OUTPATIENT)
Dept: CARDIOLOGY CLINIC | Age: 33
End: 2023-03-17

## 2023-03-17 DIAGNOSIS — R07.2 PRECORDIAL PAIN: ICD-10-CM

## 2023-03-17 DIAGNOSIS — R06.02 SHORTNESS OF BREATH: Primary | ICD-10-CM

## 2023-03-17 NOTE — TELEPHONE ENCOUNTER
Scheduling dept calling for a new stress test order.      Stress test nuclear (Order #[id number]) on 3/13/23    Needs to be a nuclear myocardial stress test

## 2023-04-26 ENCOUNTER — TELEPHONE (OUTPATIENT)
Dept: CARDIOLOGY CLINIC | Age: 33
End: 2023-04-26

## 2023-06-19 ENCOUNTER — HOSPITAL ENCOUNTER (OUTPATIENT)
Dept: NON INVASIVE DIAGNOSTICS | Age: 33
Discharge: HOME | End: 2023-06-19
Payer: COMMERCIAL

## 2023-06-19 ENCOUNTER — HOSPITAL ENCOUNTER (OUTPATIENT)
Dept: NUCLEAR MEDICINE | Age: 33
Discharge: HOME | End: 2023-06-21
Payer: COMMERCIAL

## 2023-06-19 DIAGNOSIS — R07.2 PRECORDIAL PAIN: ICD-10-CM

## 2023-06-19 DIAGNOSIS — R06.02 SHORTNESS OF BREATH: ICD-10-CM

## 2023-06-19 PROCEDURE — 3430000000 HC RX DIAGNOSTIC RADIOPHARMACEUTICAL: Performed by: NURSE PRACTITIONER

## 2023-06-19 PROCEDURE — 6360000002 HC RX W HCPCS: Performed by: NURSE PRACTITIONER

## 2023-06-19 PROCEDURE — A9502 TC99M TETROFOSMIN: HCPCS | Performed by: NURSE PRACTITIONER

## 2023-06-19 PROCEDURE — 93017 CV STRESS TEST TRACING ONLY: CPT

## 2023-06-19 PROCEDURE — 93306 TTE W/DOPPLER COMPLETE: CPT

## 2023-06-19 PROCEDURE — 2580000003 HC RX 258: Performed by: NURSE PRACTITIONER

## 2023-06-19 PROCEDURE — 78452 HT MUSCLE IMAGE SPECT MULT: CPT

## 2023-06-19 RX ORDER — SODIUM CHLORIDE 0.9 % (FLUSH) 0.9 %
10 SYRINGE (ML) INJECTION PRN
Status: COMPLETED | OUTPATIENT
Start: 2023-06-19 | End: 2023-06-19

## 2023-06-19 RX ORDER — REGADENOSON 0.08 MG/ML
0.4 INJECTION, SOLUTION INTRAVENOUS
Status: COMPLETED | OUTPATIENT
Start: 2023-06-19 | End: 2023-06-19

## 2023-06-19 RX ADMIN — TETROFOSMIN 11.8 MILLICURIE: 1.38 INJECTION, POWDER, LYOPHILIZED, FOR SOLUTION INTRAVENOUS at 08:38

## 2023-06-19 RX ADMIN — TETROFOSMIN 35.1 MILLICURIE: 1.38 INJECTION, POWDER, LYOPHILIZED, FOR SOLUTION INTRAVENOUS at 10:06

## 2023-06-19 RX ADMIN — SODIUM CHLORIDE, PRESERVATIVE FREE 10 ML: 5 INJECTION INTRAVENOUS at 08:38

## 2023-06-19 RX ADMIN — SODIUM CHLORIDE, PRESERVATIVE FREE 10 ML: 5 INJECTION INTRAVENOUS at 10:06

## 2023-06-19 RX ADMIN — SODIUM CHLORIDE, PRESERVATIVE FREE 10 ML: 5 INJECTION INTRAVENOUS at 10:07

## 2023-06-19 RX ADMIN — REGADENOSON 0.4 MG: 0.08 INJECTION, SOLUTION INTRAVENOUS at 10:06

## 2023-06-19 NOTE — PROGRESS NOTES
Reviewed history, allergies, and medications. Consent confirmed. Lexiscan exam explained. Placed patient on monitor. @ [facility] 150 here to inject [employer]. SOB noted during recovery phase. Denied chest pain. No ectopy noted. Doctor to further review and interpret results. Patient off monitor and instructed to eat, will have last part of exam in 1 hour.

## 2023-06-22 NOTE — PROCEDURES
[address]                      [address], [address] [facility]                              CARDIAC STRESS TEST    PATIENT NAME: Yady Murcia                  :        1990  MED REC NO:   [id number]                            ROOM:  ACCOUNT NO:   [de-identified]                           ADMIT DATE: 2023  PROVIDER:     Vitaliy Nichole MD    CARDIOVASCULAR DIAGNOSTIC DEPARTMENT    DATE OF STUDY:  2023    NUCLEAR STRESS TEST REPORT    ORDERING PROVIDER:  Carmina So. DOUGLAS Lancaster    INDICATION:  Shortness of breath. DESCRIPTION OF PROCEDURE:  After informed written consent a baseline EKG  was obtained, which showed normal sinus rhythm. For the rest portion of  the test, the patient received 11.8 mCi of Myoview IV. After  appropriate delay, rest SPECT images were obtained. For the stress  portion of the test, the patient received 0.4 mg of Lexiscan IV followed  by 35.1 mCi of Myoview IV. After appropriate lay stress SPECT images  were obtained. FINDINGS:  EKG during the stress portion of the test showed no ischemia,  no major arrhythmias. The patient remained asymptomatic. GATED SPECT IMAGES:  Images demonstrated normal wall thickening, normal  wall motion, and normal EF. LVEF:  67%    SPECT IMAGES:  Overall study quality was excellent. There was normal  uniform tracer distribution throughout the myocardium at rest and at  peak stress. No fixed perfusion abnormalities or reversible perfusion  defects noted. CONCLUSION:  1. This was a normal nuclear stress test with no evidence of ischemia  or infarct. 2.  Normal EF of 67%.         Tapan Duncan MD    D: 2023 #13:13:58       T: 2023 13:16:22     [email or web address]  Job#: [id number]     Doc#: [id number]    CC:

## 2023-06-30 ENCOUNTER — SCHEDULED TELEPHONE ENCOUNTER (OUTPATIENT)
Dept: CARDIOLOGY CLINIC | Age: 33
End: 2023-06-30
Payer: COMMERCIAL

## 2023-06-30 DIAGNOSIS — R07.2 PRECORDIAL PAIN: ICD-10-CM

## 2023-06-30 DIAGNOSIS — R06.09 DOE (DYSPNEA ON EXERTION): Primary | ICD-10-CM

## 2023-06-30 PROCEDURE — 99442 PR PHYS/QHP TELEPHONE EVALUATION 11-20 MIN: CPT | Performed by: INTERNAL MEDICINE

## 2023-06-30 ASSESSMENT — ENCOUNTER SYMPTOMS
CONSTIPATION: 0
EYE PAIN: 0
COUGH: 0
BLOOD IN STOOL: 0
ABDOMINAL DISTENTION: 0
CHEST TIGHTNESS: 1
SINUS PRESSURE: 0
SHORTNESS OF BREATH: 1
NAUSEA: 0
FACIAL SWELLING: 0
ABDOMINAL PAIN: 0
STRIDOR: 0
SORE THROAT: 0
RHINORRHEA: 0
ALLERGIC/IMMUNOLOGIC NEGATIVE: 1
BACK PAIN: 0
APNEA: 0
TROUBLE SWALLOWING: 0
EYE ITCHING: 0
COLOR CHANGE: 0
SINUS PAIN: 0
VOICE CHANGE: 0
DIARRHEA: 0
EYE REDNESS: 0
VOMITING: 0
WHEEZING: 0
EYE DISCHARGE: 0
PHOTOPHOBIA: 0
CHOKING: 0

## 2023-09-21 ENCOUNTER — HOSPITAL ENCOUNTER (EMERGENCY)
Age: 33
Discharge: HOME | End: 2023-09-21
Payer: COMMERCIAL

## 2023-09-21 ENCOUNTER — APPOINTMENT (OUTPATIENT)
Dept: ULTRASOUND IMAGING | Age: 33
End: 2023-09-21
Payer: COMMERCIAL

## 2023-09-21 VITALS
TEMPERATURE: 98.7 F | DIASTOLIC BLOOD PRESSURE: 73 MMHG | BODY MASS INDEX: 18.65 KG/M2 | SYSTOLIC BLOOD PRESSURE: 122 MMHG | WEIGHT: 95 LBS | HEART RATE: 95 BPM | RESPIRATION RATE: 18 BRPM | HEIGHT: 60 IN | OXYGEN SATURATION: 97 %

## 2023-09-21 DIAGNOSIS — R10.11 ABDOMINAL PAIN, RIGHT UPPER QUADRANT: Primary | ICD-10-CM

## 2023-09-21 LAB
ALBUMIN SERPL-MCNC: 5.1 G/DL (ref 3.5–4.6)
ALP SERPL-CCNC: 54 U/L (ref 40–130)
ALT SERPL-CCNC: 13 U/L (ref 0–33)
ANION GAP SERPL CALCULATED.3IONS-SCNC: 13 MEQ/L (ref 9–15)
AST SERPL-CCNC: 16 U/L (ref 0–35)
BASOPHILS # BLD: 0 K/UL (ref 0–0.2)
BASOPHILS NFR BLD: 0.6 %
BILIRUB SERPL-MCNC: 0.3 MG/DL (ref 0.2–0.7)
BILIRUB UR QL STRIP: NEGATIVE
BUN SERPL-MCNC: 7 MG/DL (ref 6–20)
CALCIUM SERPL-MCNC: 9.2 MG/DL (ref 8.5–9.9)
CHLORIDE SERPL-SCNC: 101 MEQ/L (ref 95–107)
CLARITY UR: CLEAR
CO2 SERPL-SCNC: 23 MEQ/L (ref 20–31)
COLOR UR: YELLOW
CREAT SERPL-MCNC: 0.64 MG/DL (ref 0.5–0.9)
EOSINOPHIL # BLD: 0 K/UL (ref 0–0.7)
EOSINOPHIL NFR BLD: 0.3 %
ERYTHROCYTE [DISTWIDTH] IN BLOOD BY AUTOMATED COUNT: 11.9 % (ref 11.5–14.5)
GLOBULIN SER CALC-MCNC: 3 G/DL (ref 2.3–3.5)
GLUCOSE SERPL-MCNC: 103 MG/DL (ref 70–99)
GLUCOSE UR STRIP-MCNC: NEGATIVE MG/DL
HCT VFR BLD AUTO: 41.9 % (ref 37–47)
HGB BLD-MCNC: 14 G/DL (ref 12–16)
HGB UR QL STRIP: NEGATIVE
KETONES UR STRIP-MCNC: NEGATIVE MG/DL
LACTATE BLDV-SCNC: 0.9 MMOL/L (ref 0.5–2.2)
LEUKOCYTE ESTERASE UR QL STRIP: NEGATIVE
LIPASE SERPL-CCNC: 29 U/L (ref 12–95)
LYMPHOCYTES # BLD: 1.9 K/UL (ref 1–4.8)
LYMPHOCYTES NFR BLD: 28.2 %
MCH RBC QN AUTO: 32.7 PG (ref 27–31.3)
MCHC RBC AUTO-ENTMCNC: 33.4 % (ref 33–37)
MCV RBC AUTO: 97.9 FL (ref 79.4–94.8)
MONOCYTES # BLD: 0.6 K/UL (ref 0.2–0.8)
MONOCYTES NFR BLD: 8.4 %
NEUTROPHILS # BLD: 4.2 K/UL (ref 1.4–6.5)
NEUTS SEG NFR BLD: 62.2 %
NITRITE UR QL STRIP: NEGATIVE
PH UR STRIP: 5.5 [PH] (ref 5–9)
PLATELET # BLD AUTO: 235 K/UL (ref 130–400)
POTASSIUM SERPL-SCNC: 4.2 MEQ/L (ref 3.4–4.9)
PROT SERPL-MCNC: 8.1 G/DL (ref 6.3–8)
PROT UR STRIP-MCNC: NEGATIVE MG/DL
RBC # BLD AUTO: 4.28 M/UL (ref 4.2–5.4)
SODIUM SERPL-SCNC: 137 MEQ/L (ref 135–144)
SP GR UR STRIP: 1.02 (ref 1–1.03)
URINE REFLEX TO CULTURE: NORMAL
UROBILINOGEN UR STRIP-ACNC: 1 E.U./DL
WBC # BLD AUTO: 6.8 K/UL (ref 4.8–10.8)

## 2023-09-21 PROCEDURE — 2580000003 HC RX 258: Performed by: PHYSICIAN ASSISTANT

## 2023-09-21 PROCEDURE — 76705 ECHO EXAM OF ABDOMEN: CPT

## 2023-09-21 PROCEDURE — 81003 URINALYSIS AUTO W/O SCOPE: CPT

## 2023-09-21 PROCEDURE — 80053 COMPREHEN METABOLIC PANEL: CPT

## 2023-09-21 PROCEDURE — 36415 COLL VENOUS BLD VENIPUNCTURE: CPT

## 2023-09-21 PROCEDURE — 85025 COMPLETE CBC W/AUTO DIFF WBC: CPT

## 2023-09-21 PROCEDURE — 83690 ASSAY OF LIPASE: CPT

## 2023-09-21 PROCEDURE — 83605 ASSAY OF LACTIC ACID: CPT

## 2023-09-21 PROCEDURE — 99284 EMERGENCY DEPT VISIT MOD MDM: CPT

## 2023-09-21 RX ORDER — DICYCLOMINE HCL 20 MG
20 TABLET ORAL 4 TIMES DAILY PRN
Qty: 20 TABLET | Refills: 0 | Status: SHIPPED | OUTPATIENT
Start: 2023-09-21

## 2023-09-21 RX ORDER — 0.9 % SODIUM CHLORIDE 0.9 %
1000 INTRAVENOUS SOLUTION INTRAVENOUS ONCE
Status: COMPLETED | OUTPATIENT
Start: 2023-09-21 | End: 2023-09-21

## 2023-09-21 RX ADMIN — SODIUM CHLORIDE 1000 ML: 9 INJECTION, SOLUTION INTRAVENOUS at 17:43

## 2023-09-21 ASSESSMENT — ENCOUNTER SYMPTOMS
NAUSEA: 1
SHORTNESS OF BREATH: 0
CHEST TIGHTNESS: 0
ABDOMINAL PAIN: 1

## 2023-09-21 ASSESSMENT — PAIN DESCRIPTION - LOCATION: LOCATION: ABDOMEN

## 2023-09-21 ASSESSMENT — PAIN - FUNCTIONAL ASSESSMENT: PAIN_FUNCTIONAL_ASSESSMENT: 0-10

## 2023-09-21 ASSESSMENT — PAIN DESCRIPTION - DESCRIPTORS: DESCRIPTORS: DULL

## 2023-09-21 ASSESSMENT — PAIN DESCRIPTION - ORIENTATION: ORIENTATION: RIGHT

## 2023-09-21 ASSESSMENT — PAIN SCALES - GENERAL: PAINLEVEL_OUTOF10: 4

## 2023-09-21 NOTE — ED TRIAGE NOTES
Pt presents to ER with abdominal pain for the last 3 days. Pt has right sided abdominal pain and a h/o family issues with the gallbladder. Pt has no nausea and vomiting. Pt is A&ox4, warm and dry at this time.

## 2023-12-15 ENCOUNTER — OFFICE VISIT (OUTPATIENT)
Dept: PRIMARY CARE CLINIC | Age: 33
End: 2023-12-15

## 2023-12-15 VITALS
OXYGEN SATURATION: 98 % | BODY MASS INDEX: 18.87 KG/M2 | HEART RATE: 83 BPM | SYSTOLIC BLOOD PRESSURE: 120 MMHG | WEIGHT: 95 LBS | TEMPERATURE: 97.9 F | DIASTOLIC BLOOD PRESSURE: 78 MMHG

## 2023-12-15 DIAGNOSIS — R07.2 PRECORDIAL PAIN: ICD-10-CM

## 2023-12-15 DIAGNOSIS — J45.20 MILD INTERMITTENT ASTHMA, UNSPECIFIED WHETHER COMPLICATED: ICD-10-CM

## 2023-12-15 DIAGNOSIS — K52.9 CHRONIC DIARRHEA OF UNKNOWN ORIGIN: ICD-10-CM

## 2023-12-15 DIAGNOSIS — J06.9 URI WITH COUGH AND CONGESTION: Primary | ICD-10-CM

## 2023-12-15 LAB
INFLUENZA A ANTIBODY: NEGATIVE
INFLUENZA B ANTIBODY: NEGATIVE
Lab: NORMAL
PERFORMING INSTRUMENT: NORMAL
QC PASS/FAIL: NORMAL
SARS-COV-2, POC: NORMAL

## 2023-12-15 RX ORDER — DICYCLOMINE HCL 20 MG
20 TABLET ORAL 4 TIMES DAILY PRN
Qty: 20 TABLET | Refills: 0 | Status: SHIPPED | OUTPATIENT
Start: 2023-12-15

## 2023-12-15 RX ORDER — ALBUTEROL SULFATE 1.25 MG/3ML
1 SOLUTION RESPIRATORY (INHALATION) EVERY 4 HOURS PRN
Qty: 120 EACH | Refills: 3 | Status: SHIPPED | OUTPATIENT
Start: 2023-12-15

## 2023-12-15 RX ORDER — RANOLAZINE 500 MG/1
TABLET, EXTENDED RELEASE ORAL
Qty: 60 TABLET | Refills: 1 | Status: SHIPPED | OUTPATIENT
Start: 2023-12-15

## 2023-12-15 RX ORDER — ALBUTEROL SULFATE 90 UG/1
2 AEROSOL, METERED RESPIRATORY (INHALATION) 2 TIMES DAILY
Qty: 2 EACH | Refills: 3 | Status: SHIPPED | OUTPATIENT
Start: 2023-12-15

## 2023-12-15 RX ORDER — CETIRIZINE HYDROCHLORIDE 10 MG/1
10 TABLET ORAL DAILY
Qty: 30 TABLET | Refills: 3 | Status: SHIPPED | OUTPATIENT
Start: 2023-12-15 | End: 2024-04-13

## 2023-12-15 RX ORDER — PROPRANOLOL HCL 60 MG
CAPSULE, EXTENDED RELEASE 24HR ORAL
Qty: 90 CAPSULE | Refills: 0 | Status: SHIPPED | OUTPATIENT
Start: 2023-12-15

## 2023-12-15 RX ORDER — GUAIFENESIN AND DEXTROMETHORPHAN HYDROBROMIDE 1200; 60 MG/1; MG/1
1 TABLET, EXTENDED RELEASE ORAL 2 TIMES DAILY
Qty: 28 TABLET | Refills: 0 | Status: SHIPPED | OUTPATIENT
Start: 2023-12-15

## 2023-12-15 NOTE — PROGRESS NOTES
Error
Appearance: Normal appearance. Cardiovascular:      Rate and Rhythm: Normal rate and regular rhythm. Pulmonary:      Effort: Pulmonary effort is normal.      Breath sounds: Normal breath sounds. No wheezing. Neurological:      Mental Status: She is alert.        Allergies   Allergen Reactions    Acyclovir And Related Swelling    Ibuprofen Hives    Morphine Itching    Pcn [Penicillins] Anaphylaxis and Hives    Amoxicillin Hives and Swelling     Throat swelling    Cephalexin Hives    Macrobid [Nitrofurantoin Macrocrystal] Hives    Hydrocodone-Acetaminophen Itching    Oxycodone-Acetaminophen Itching       Current Outpatient Medications:     albuterol (ACCUNEB) 1.25 MG/3ML nebulizer solution, Inhale 3 mLs into the lungs every 4 hours as needed for Wheezing, Disp: 120 each, Rfl: 3    albuterol sulfate HFA (PROVENTIL;VENTOLIN;PROAIR) 108 (90 Base) MCG/ACT inhaler, Inhale 2 puffs into the lungs 2 times daily, Disp: 2 each, Rfl: 3    propranolol (INDERAL LA) 60 MG extended release capsule, TAKE 1 CAPSULE BY MOUTH EVERY DAY, Disp: 90 capsule, Rfl: 0    ranolazine (RANEXA) 500 MG extended release tablet, TAKE 1 TABLET BY MOUTH TWICE A DAY, Disp: 60 tablet, Rfl: 1    cetirizine (ZYRTEC) 10 MG tablet, Take 1 tablet by mouth daily, Disp: 30 tablet, Rfl: 3    dicyclomine (BENTYL) 20 MG tablet, Take 1 tablet by mouth 4 times daily as needed (abd pain), Disp: 20 tablet, Rfl: 0    Dextromethorphan-guaiFENesin (MUCINEX DM MAXIMUM STRENGTH)  MG TB12, Take 1 tablet by mouth in the morning and at bedtime, Disp: 28 tablet, Rfl: 0    nitroGLYCERIN (NITROSTAT) 0.4 MG SL tablet, Place 1 tablet under the tongue every 5 minutes as needed for Chest pain, Disp: 25 tablet, Rfl: 0    brompheniramine-pseudoephedrine-DM 2-30-10 MG/5ML syrup, Take 5 mLs by mouth 4 times daily as needed for Congestion or Cough, Disp: 180 mL, Rfl: 0    topiramate (TOPAMAX) 50 MG tablet, TAKE 1 TABLET BY MOUTH TWICE A DAY, Disp: 180 tablet, Rfl: 1

## 2023-12-21 ENCOUNTER — TELEPHONE (OUTPATIENT)
Dept: FAMILY MEDICINE CLINIC | Age: 33
End: 2023-12-21

## 2023-12-21 NOTE — TELEPHONE ENCOUNTER
The pharmacy called the albuterol is not covered by the insurance. They said you can send the 0.083 albuterol and this should be covered by the insurance.

## 2023-12-22 ENCOUNTER — TELEPHONE (OUTPATIENT)
Dept: PRIMARY CARE CLINIC | Age: 33
End: 2023-12-22

## 2023-12-29 ENCOUNTER — TELEPHONE (OUTPATIENT)
Dept: PRIMARY CARE CLINIC | Age: 33
End: 2023-12-29

## 2023-12-29 NOTE — TELEPHONE ENCOUNTER
Call can not be completed at this time. The nebulizer medication is ready at the pharmacy for her to .   The new dose did not need a PA

## 2024-04-05 DIAGNOSIS — R07.2 PRECORDIAL PAIN: ICD-10-CM

## 2024-04-05 RX ORDER — PROPRANOLOL HCL 60 MG
CAPSULE, EXTENDED RELEASE 24HR ORAL
Qty: 90 CAPSULE | Refills: 0 | OUTPATIENT
Start: 2024-04-05

## 2024-10-17 ENCOUNTER — PATIENT MESSAGE (OUTPATIENT)
Dept: FAMILY MEDICINE CLINIC | Age: 34
End: 2024-10-17

## 2024-10-17 ENCOUNTER — OFFICE VISIT (OUTPATIENT)
Dept: FAMILY MEDICINE CLINIC | Age: 34
End: 2024-10-17
Payer: COMMERCIAL

## 2024-10-17 VITALS
SYSTOLIC BLOOD PRESSURE: 100 MMHG | HEIGHT: 59 IN | DIASTOLIC BLOOD PRESSURE: 62 MMHG | WEIGHT: 107 LBS | BODY MASS INDEX: 21.57 KG/M2 | OXYGEN SATURATION: 99 %

## 2024-10-17 DIAGNOSIS — R22.1 LOCALIZED SWELLING, MASS OR LUMP OF NECK: Primary | ICD-10-CM

## 2024-10-17 DIAGNOSIS — R22.1 LOCALIZED SWELLING, MASS OR LUMP OF NECK: ICD-10-CM

## 2024-10-17 LAB
ALBUMIN SERPL-MCNC: 4.6 G/DL (ref 3.5–4.6)
ALP SERPL-CCNC: 47 U/L (ref 40–130)
ALT SERPL-CCNC: 17 U/L (ref 0–33)
ANION GAP SERPL CALCULATED.3IONS-SCNC: 11 MEQ/L (ref 9–15)
AST SERPL-CCNC: 16 U/L (ref 0–35)
BASOPHILS # BLD: 0 K/UL (ref 0–0.2)
BASOPHILS NFR BLD: 0.4 %
BILIRUB SERPL-MCNC: 0.5 MG/DL (ref 0.2–0.7)
BUN SERPL-MCNC: 12 MG/DL (ref 6–20)
CALCIUM SERPL-MCNC: 9 MG/DL (ref 8.5–9.9)
CHLORIDE SERPL-SCNC: 101 MEQ/L (ref 95–107)
CO2 SERPL-SCNC: 22 MEQ/L (ref 20–31)
CREAT SERPL-MCNC: 0.67 MG/DL (ref 0.5–0.9)
EOSINOPHIL # BLD: 0.1 K/UL (ref 0–0.7)
EOSINOPHIL NFR BLD: 0.8 %
ERYTHROCYTE [DISTWIDTH] IN BLOOD BY AUTOMATED COUNT: 11.8 % (ref 11.5–14.5)
GLOBULIN SER CALC-MCNC: 2.9 G/DL (ref 2.3–3.5)
GLUCOSE SERPL-MCNC: 89 MG/DL (ref 70–99)
HCT VFR BLD AUTO: 41.5 % (ref 37–47)
HGB BLD-MCNC: 14.2 G/DL (ref 12–16)
LYMPHOCYTES # BLD: 3.6 K/UL (ref 1–4.8)
LYMPHOCYTES NFR BLD: 36.1 %
MCH RBC QN AUTO: 33.2 PG (ref 27–31.3)
MCHC RBC AUTO-ENTMCNC: 34.2 % (ref 33–37)
MCV RBC AUTO: 97 FL (ref 79.4–94.8)
MONOCYTES # BLD: 0.6 K/UL (ref 0.2–0.8)
MONOCYTES NFR BLD: 5.7 %
NEUTROPHILS # BLD: 5.7 K/UL (ref 1.4–6.5)
NEUTS SEG NFR BLD: 56.7 %
PLATELET # BLD AUTO: 229 K/UL (ref 130–400)
POTASSIUM SERPL-SCNC: 4.2 MEQ/L (ref 3.4–4.9)
PROT SERPL-MCNC: 7.5 G/DL (ref 6.3–8)
RBC # BLD AUTO: 4.28 M/UL (ref 4.2–5.4)
SODIUM SERPL-SCNC: 134 MEQ/L (ref 135–144)
WBC # BLD AUTO: 10.1 K/UL (ref 4.8–10.8)

## 2024-10-17 PROCEDURE — G8420 CALC BMI NORM PARAMETERS: HCPCS | Performed by: NURSE PRACTITIONER

## 2024-10-17 PROCEDURE — 4004F PT TOBACCO SCREEN RCVD TLK: CPT | Performed by: NURSE PRACTITIONER

## 2024-10-17 PROCEDURE — 99213 OFFICE O/P EST LOW 20 MIN: CPT | Performed by: NURSE PRACTITIONER

## 2024-10-17 PROCEDURE — G8427 DOCREV CUR MEDS BY ELIG CLIN: HCPCS | Performed by: NURSE PRACTITIONER

## 2024-10-17 PROCEDURE — G8484 FLU IMMUNIZE NO ADMIN: HCPCS | Performed by: NURSE PRACTITIONER

## 2024-10-17 SDOH — ECONOMIC STABILITY: FOOD INSECURITY: WITHIN THE PAST 12 MONTHS, YOU WORRIED THAT YOUR FOOD WOULD RUN OUT BEFORE YOU GOT MONEY TO BUY MORE.: NEVER TRUE

## 2024-10-17 SDOH — ECONOMIC STABILITY: FOOD INSECURITY: WITHIN THE PAST 12 MONTHS, THE FOOD YOU BOUGHT JUST DIDN'T LAST AND YOU DIDN'T HAVE MONEY TO GET MORE.: NEVER TRUE

## 2024-10-17 SDOH — ECONOMIC STABILITY: INCOME INSECURITY: HOW HARD IS IT FOR YOU TO PAY FOR THE VERY BASICS LIKE FOOD, HOUSING, MEDICAL CARE, AND HEATING?: NOT HARD AT ALL

## 2024-10-17 ASSESSMENT — PATIENT HEALTH QUESTIONNAIRE - PHQ9
SUM OF ALL RESPONSES TO PHQ QUESTIONS 1-9: 0
SUM OF ALL RESPONSES TO PHQ9 QUESTIONS 1 & 2: 0
10. IF YOU CHECKED OFF ANY PROBLEMS, HOW DIFFICULT HAVE THESE PROBLEMS MADE IT FOR YOU TO DO YOUR WORK, TAKE CARE OF THINGS AT HOME, OR GET ALONG WITH OTHER PEOPLE: NOT DIFFICULT AT ALL
7. TROUBLE CONCENTRATING ON THINGS, SUCH AS READING THE NEWSPAPER OR WATCHING TELEVISION: NOT AT ALL
1. LITTLE INTEREST OR PLEASURE IN DOING THINGS: NOT AT ALL
9. THOUGHTS THAT YOU WOULD BE BETTER OFF DEAD, OR OF HURTING YOURSELF: NOT AT ALL
6. FEELING BAD ABOUT YOURSELF - OR THAT YOU ARE A FAILURE OR HAVE LET YOURSELF OR YOUR FAMILY DOWN: NOT AT ALL
3. TROUBLE FALLING OR STAYING ASLEEP: NOT AT ALL
8. MOVING OR SPEAKING SO SLOWLY THAT OTHER PEOPLE COULD HAVE NOTICED. OR THE OPPOSITE, BEING SO FIGETY OR RESTLESS THAT YOU HAVE BEEN MOVING AROUND A LOT MORE THAN USUAL: NOT AT ALL
SUM OF ALL RESPONSES TO PHQ QUESTIONS 1-9: 0
4. FEELING TIRED OR HAVING LITTLE ENERGY: NOT AT ALL
2. FEELING DOWN, DEPRESSED OR HOPELESS: NOT AT ALL
5. POOR APPETITE OR OVEREATING: NOT AT ALL

## 2024-10-17 ASSESSMENT — ENCOUNTER SYMPTOMS
VOMITING: 0
RHINORRHEA: 0
COUGH: 0
TROUBLE SWALLOWING: 0
NAUSEA: 1
SORE THROAT: 0
VOICE CHANGE: 0
PHOTOPHOBIA: 0

## 2024-10-17 NOTE — PROGRESS NOTES
change. Negative for appetite change, chills, diaphoresis, fatigue and fever.   HENT:  Negative for congestion, ear pain, rhinorrhea, sore throat, trouble swallowing and voice change.    Eyes:  Negative for photophobia and visual disturbance.   Respiratory:  Negative for cough.    Gastrointestinal:  Positive for nausea. Negative for vomiting.   Musculoskeletal:  Positive for neck pain. Negative for neck stiffness.   Neurological:  Positive for headaches (chronic issue).         PMH, Surgical Hx, Family Hx, and Social Hx reviewed and updated.  Health Maintenance reviewed.            Objective  Vitals:    10/17/24 0910   BP: 100/62   Site: Left Upper Arm   Position: Sitting   Cuff Size: Large Adult   SpO2: 99%   Weight: 48.5 kg (107 lb)   Height: 1.499 m (4' 11\")     BP Readings from Last 3 Encounters:   10/17/24 100/62   12/15/23 120/78   09/21/23 122/73     Wt Readings from Last 3 Encounters:   10/17/24 48.5 kg (107 lb)   12/15/23 43.1 kg (95 lb)   09/21/23 43.1 kg (95 lb)             Physical Exam  Vitals reviewed.   Constitutional:       General: She is awake. She is not in acute distress.     Appearance: She is not toxic-appearing.   HENT:      Right Ear: External ear normal.      Left Ear: External ear normal.      Nose: Nose normal.      Mouth/Throat:      Lips: Pink.      Mouth: Mucous membranes are moist.   Eyes:      General: Lids are normal.      Extraocular Movements: Extraocular movements intact.      Conjunctiva/sclera: Conjunctivae normal.      Pupils: Pupils are equal, round, and reactive to light.   Neck:     Cardiovascular:      Rate and Rhythm: Normal rate.   Pulmonary:      Effort: Pulmonary effort is normal.   Musculoskeletal:      Cervical back: Normal range of motion. No erythema, rigidity, torticollis or crepitus. Muscular tenderness present. Normal range of motion.   Skin:     General: Skin is warm and dry.   Neurological:      General: No focal deficit present.      Mental Status: She is

## 2024-10-24 ENCOUNTER — HOSPITAL ENCOUNTER (OUTPATIENT)
Dept: ULTRASOUND IMAGING | Age: 34
Discharge: HOME | End: 2024-10-26
Payer: COMMERCIAL

## 2024-10-24 DIAGNOSIS — R22.1 LOCALIZED SWELLING, MASS OR LUMP OF NECK: ICD-10-CM

## 2024-10-24 PROCEDURE — 76999 ECHO EXAMINATION PROCEDURE: CPT

## 2025-01-29 DIAGNOSIS — R07.2 PRECORDIAL PAIN: ICD-10-CM

## 2025-01-29 RX ORDER — PROPRANOLOL HYDROCHLORIDE 60 MG/1
CAPSULE, EXTENDED RELEASE ORAL
Qty: 30 CAPSULE | Refills: 0 | Status: SHIPPED | OUTPATIENT
Start: 2025-01-29

## 2025-01-29 RX ORDER — RANOLAZINE 500 MG/1
TABLET, EXTENDED RELEASE ORAL
Qty: 60 TABLET | Refills: 0 | Status: SHIPPED | OUTPATIENT
Start: 2025-01-29

## 2025-01-29 NOTE — TELEPHONE ENCOUNTER
Requesting medication refill. Please approve or deny this request.    Rx requested:  Requested Prescriptions     Pending Prescriptions Disp Refills    propranolol (INDERAL LA) 60 MG extended release capsule 90 capsule 0     Sig: TAKE 1 CAPSULE BY MOUTH EVERY DAY    ranolazine (RANEXA) 500 MG extended release tablet 60 tablet 1     Sig: TAKE 1 TABLET BY MOUTH TWICE A DAY         Last Office Visit:   6/30/2023      Next Visit Date:  Future Appointments   Date Time Provider Department Center   1/30/2025 11:30 AM Mayo Ward APRN - CNP [facility]   7/28/2025  3:45 PM Khai Patricia MD [facility] -

## 2025-01-29 NOTE — TELEPHONE ENCOUNTER
Pt has been having dizziness and sob when when walking up and down the stairs.   Pt has been feeling really tired.  Pt could just be sitting there and still have a high heart rate.       PT SCHEDULED APPT WITH JUDIT FOR TOMORROW.

## 2025-01-30 ENCOUNTER — OFFICE VISIT (OUTPATIENT)
Dept: CARDIOLOGY CLINIC | Age: 35
End: 2025-01-30

## 2025-01-30 VITALS
HEART RATE: 65 BPM | BODY MASS INDEX: 22.22 KG/M2 | DIASTOLIC BLOOD PRESSURE: 72 MMHG | WEIGHT: 110 LBS | OXYGEN SATURATION: 99 % | SYSTOLIC BLOOD PRESSURE: 112 MMHG

## 2025-01-30 DIAGNOSIS — R07.9 CHEST PAIN, UNSPECIFIED TYPE: ICD-10-CM

## 2025-01-30 DIAGNOSIS — Z00.00 ROUTINE ADULT HEALTH MAINTENANCE: Primary | ICD-10-CM

## 2025-01-30 DIAGNOSIS — R06.02 SHORTNESS OF BREATH: ICD-10-CM

## 2025-01-30 DIAGNOSIS — R55 SYNCOPE, UNSPECIFIED SYNCOPE TYPE: ICD-10-CM

## 2025-01-30 DIAGNOSIS — R07.9 CHEST PAIN AT REST: ICD-10-CM

## 2025-01-30 RX ORDER — REGADENOSON 0.08 MG/ML
0.4 INJECTION, SOLUTION INTRAVENOUS
OUTPATIENT
Start: 2025-01-30

## 2025-01-30 ASSESSMENT — ENCOUNTER SYMPTOMS
PHOTOPHOBIA: 0
BLOOD IN STOOL: 0
WHEEZING: 0
BACK PAIN: 0
CHEST TIGHTNESS: 1
ALLERGIC/IMMUNOLOGIC NEGATIVE: 1
NAUSEA: 0
VOMITING: 0
SINUS PRESSURE: 0
TROUBLE SWALLOWING: 0
EYE REDNESS: 0
EYE DISCHARGE: 0
FACIAL SWELLING: 0
COLOR CHANGE: 0
SINUS PAIN: 0
COUGH: 0
ABDOMINAL DISTENTION: 0
EYE ITCHING: 0
STRIDOR: 0
APNEA: 0
DIARRHEA: 0
RHINORRHEA: 0
EYE PAIN: 0
CONSTIPATION: 0
VOICE CHANGE: 0
CHOKING: 0
ABDOMINAL PAIN: 0
SORE THROAT: 0
SHORTNESS OF BREATH: 1

## 2025-01-30 NOTE — PROGRESS NOTES
Patient: Laly Joyner  YOB: 1990  MRN: [id number]    Chief Complaint:   Chief Complaint   Patient presents with    Follow-up     Current symptoms are worsening at rest and exertion  Increased HR  SOB   Fatigue  Dizziness  Left arm and left leg pain         Subjective/HPI   3/13/2023: Patient with past medical history significant for asthma, kidney stones, postpartum depression, encephalopathy, anemia, chronic migraines, angina.  Been seen in this office since 2019.  Patient had negative stress echo in 2020.  Patient seen in office today for hospital discharge follow-up.  Patient was recently seen in the emergency department with complaints of midsternal chest pain.  Patient reports intermittent episodes of chest pain.  Reports recently she was sitting on the couch and experienced a sudden, sharp, electricity type chest pain on the left side of her chest that went straight through to her back.  Symptoms lasted for a few seconds and then spontaneously resolved.  Patient has taken nitro a few times in the past few months for chest pain.  She also reports feeling very short of breath with minimal exertion.  States she feels short of breath while talking to me in the office today.  No conversational dyspnea noted.    Pt denies  nausea, vomiting, diarrhea, constipation, motor weakness, insomnia, weight loss, syncope, dizziness, lightheadedness, palpitations, PND, orthopnea, or claudication. No bleeding issues. Pt denies any CHF type symptoms.  No recent hospitalizations.  Pt is compliant with all Rx medications.  Blood pressure and heart rate are under control.       1-:   +strong family history of CAD  +tobacco abuse, anemia, angina  No history of HTN, HLD, DM  Patient  here today for follow up. Having dizzy spells. Does report syncopal episode about to weeks ago. Reports just sitting on the couch and becomes dizzy then passes out. Doesn't remember much after that.   Reports increase in fatigue.

## 2025-02-12 DIAGNOSIS — R07.2 PRECORDIAL PAIN: ICD-10-CM

## 2025-02-12 RX ORDER — RANOLAZINE 500 MG/1
TABLET, EXTENDED RELEASE ORAL
Qty: 180 TABLET | Refills: 3 | Status: SHIPPED | OUTPATIENT
Start: 2025-02-12

## 2025-02-12 RX ORDER — PROPRANOLOL HYDROCHLORIDE 60 MG/1
CAPSULE, EXTENDED RELEASE ORAL
Qty: 90 CAPSULE | Refills: 3 | Status: SHIPPED | OUTPATIENT
Start: 2025-02-12

## 2025-02-12 NOTE — TELEPHONE ENCOUNTER
Requesting medication refill. Please approve or deny this request.    Rx requested:  Requested Prescriptions     Pending Prescriptions Disp Refills    ranolazine (RANEXA) 500 MG extended release tablet [Pharmacy Med Name: RANOLAZINE  MG TABLET] 180 tablet 1     Sig: TAKE 1 TABLET BY MOUTH TWICE A DAY    propranolol (INDERAL LA) 60 MG extended release capsule [Pharmacy Med Name: PROPRANOLOL ER 60 MG CAPSULE] 90 capsule 1     Sig: TAKE 1 CAPSULE BY MOUTH EVERY DAY         Last Office Visit:   6/30/2023      Next Visit Date:  Future Appointments   Date Time Provider Department Center   2/24/2025  9:00 AM [facility] 1 [facility]   2/24/2025 10:00 AM [facility] 1 [facility]   2/24/2025 10:30 AM [facility] STRESS LAB 1 [facility]  [facility]   2/24/2025 12:00 PM [facility]   2/24/2025 12:45 PM [facility] ECHO 1 [facility]  [facility]   7/28/2025  3:45 PM Khai Patricia MD [facility] -

## 2025-02-14 DIAGNOSIS — R55 SYNCOPE, UNSPECIFIED SYNCOPE TYPE: ICD-10-CM

## 2025-02-17 ENCOUNTER — TELEPHONE (OUTPATIENT)
Dept: CARDIOLOGY CLINIC | Age: 35
End: 2025-02-17

## 2025-02-24 ENCOUNTER — TELEPHONE (OUTPATIENT)
Dept: CARDIOLOGY CLINIC | Age: 35
End: 2025-02-24

## 2025-02-24 NOTE — TELEPHONE ENCOUNTER
Per olayinka from [facility] pt did not show up for testing, she is going to cancel testing for today and not reschedule her. Pt has a high no show percentage. Pt said she would show up for testing when olayinka called her Friday but pt no showed this morning. Please advise

## 2025-04-25 ENCOUNTER — HOSPITAL ENCOUNTER (OUTPATIENT)
Dept: CARDIOLOGY | Facility: CLINIC | Age: 35
Discharge: HOME | End: 2025-04-25
Payer: COMMERCIAL

## 2025-04-25 ENCOUNTER — HOSPITAL ENCOUNTER (OUTPATIENT)
Dept: RADIOLOGY | Facility: CLINIC | Age: 35
Discharge: HOME | End: 2025-04-25
Payer: COMMERCIAL

## 2025-04-25 DIAGNOSIS — R06.02 SHORTNESS OF BREATH: Primary | ICD-10-CM

## 2025-04-25 DIAGNOSIS — R06.02 SHORTNESS OF BREATH: ICD-10-CM

## 2025-04-25 DIAGNOSIS — R07.9 CHEST PAIN, UNSPECIFIED TYPE: ICD-10-CM

## 2025-04-25 DIAGNOSIS — R07.9 CHEST PAIN, UNSPECIFIED: ICD-10-CM

## 2025-04-25 PROCEDURE — A9502 TC99M TETROFOSMIN: HCPCS | Performed by: INTERNAL MEDICINE

## 2025-04-25 PROCEDURE — 93017 CV STRESS TEST TRACING ONLY: CPT

## 2025-04-25 PROCEDURE — 3430000001 HC RX 343 DIAGNOSTIC RADIOPHARMACEUTICALS: Performed by: INTERNAL MEDICINE

## 2025-04-25 PROCEDURE — 93306 TTE W/DOPPLER COMPLETE: CPT | Performed by: INTERNAL MEDICINE

## 2025-04-25 PROCEDURE — 2500000004 HC RX 250 GENERAL PHARMACY W/ HCPCS (ALT 636 FOR OP/ED): Mod: JZ | Performed by: INTERNAL MEDICINE

## 2025-04-25 PROCEDURE — 93306 TTE W/DOPPLER COMPLETE: CPT

## 2025-04-25 PROCEDURE — 78452 HT MUSCLE IMAGE SPECT MULT: CPT

## 2025-04-25 RX ORDER — RANOLAZINE 500 MG/1
500 TABLET, EXTENDED RELEASE ORAL 2 TIMES DAILY
COMMUNITY

## 2025-04-25 RX ORDER — SUMATRIPTAN SUCCINATE 4 MG/.5ML
INJECTION, SOLUTION SUBCUTANEOUS
COMMUNITY

## 2025-04-25 RX ORDER — ALBUTEROL SULFATE 0.63 MG/3ML
0.63 SOLUTION RESPIRATORY (INHALATION) EVERY 6 HOURS PRN
COMMUNITY

## 2025-04-25 RX ORDER — DICYCLOMINE HYDROCHLORIDE 10 MG/1
10 CAPSULE ORAL 4 TIMES DAILY
COMMUNITY

## 2025-04-25 RX ORDER — REGADENOSON 0.08 MG/ML
0.4 INJECTION, SOLUTION INTRAVENOUS ONCE
Status: COMPLETED | OUTPATIENT
Start: 2025-04-25 | End: 2025-04-25

## 2025-04-25 RX ORDER — NITROGLYCERIN 0.3 MG/1
0.3 TABLET SUBLINGUAL EVERY 5 MIN PRN
COMMUNITY

## 2025-04-25 RX ADMIN — REGADENOSON 0.4 MG: 0.08 INJECTION, SOLUTION INTRAVENOUS at 08:57

## 2025-04-25 RX ADMIN — TETROFOSMIN 11.7 MILLICURIE: 0.23 INJECTION, POWDER, LYOPHILIZED, FOR SOLUTION INTRAVENOUS at 07:23

## 2025-04-25 RX ADMIN — TETROFOSMIN 34.9 MILLICURIE: 0.23 INJECTION, POWDER, LYOPHILIZED, FOR SOLUTION INTRAVENOUS at 08:53

## 2025-04-26 LAB
AORTIC VALVE MEAN GRADIENT: 3 MMHG
AORTIC VALVE PEAK VELOCITY: 1.18 M/S
AV PEAK GRADIENT: 6 MMHG
AVA (PEAK VEL): 1.57 CM2
AVA (VTI): 1.75 CM2
EJECTION FRACTION APICAL 4 CHAMBER: 55
EJECTION FRACTION: 58 %
LEFT ATRIUM VOLUME AREA LENGTH INDEX BSA: 15.4 ML/M2
LEFT VENTRICLE INTERNAL DIMENSION DIASTOLE: 3.81 CM (ref 3.5–6)
LEFT VENTRICULAR OUTFLOW TRACT DIAMETER: 1.61 CM
LV EJECTION FRACTION BIPLANE: 59 %
MITRAL VALVE E/A RATIO: 1
RIGHT VENTRICLE PEAK SYSTOLIC PRESSURE: 29.7 MMHG

## 2025-04-28 DIAGNOSIS — R07.9 CHEST PAIN, UNSPECIFIED TYPE: ICD-10-CM

## 2025-04-28 DIAGNOSIS — R06.02 SHORTNESS OF BREATH: ICD-10-CM

## 2025-05-03 SDOH — ECONOMIC STABILITY: TRANSPORTATION INSECURITY
IN THE PAST 12 MONTHS, HAS THE LACK OF TRANSPORTATION KEPT YOU FROM MEDICAL APPOINTMENTS OR FROM GETTING MEDICATIONS?: NO

## 2025-05-03 SDOH — ECONOMIC STABILITY: FOOD INSECURITY: WITHIN THE PAST 12 MONTHS, THE FOOD YOU BOUGHT JUST DIDN'T LAST AND YOU DIDN'T HAVE MONEY TO GET MORE.: NEVER TRUE

## 2025-05-03 SDOH — ECONOMIC STABILITY: INCOME INSECURITY: IN THE LAST 12 MONTHS, WAS THERE A TIME WHEN YOU WERE NOT ABLE TO PAY THE MORTGAGE OR RENT ON TIME?: NO

## 2025-05-03 SDOH — ECONOMIC STABILITY: FOOD INSECURITY: WITHIN THE PAST 12 MONTHS, YOU WORRIED THAT YOUR FOOD WOULD RUN OUT BEFORE YOU GOT MONEY TO BUY MORE.: NEVER TRUE

## 2025-05-03 ASSESSMENT — PATIENT HEALTH QUESTIONNAIRE - PHQ9
6. FEELING BAD ABOUT YOURSELF - OR THAT YOU ARE A FAILURE OR HAVE LET YOURSELF OR YOUR FAMILY DOWN: NOT AT ALL
SUM OF ALL RESPONSES TO PHQ QUESTIONS 1-9: 0
5. POOR APPETITE OR OVEREATING: NOT AT ALL
4. FEELING TIRED OR HAVING LITTLE ENERGY: NOT AT ALL
1. LITTLE INTEREST OR PLEASURE IN DOING THINGS: NOT AT ALL
8. MOVING OR SPEAKING SO SLOWLY THAT OTHER PEOPLE COULD HAVE NOTICED. OR THE OPPOSITE - BEING SO FIDGETY OR RESTLESS THAT YOU HAVE BEEN MOVING AROUND A LOT MORE THAN USUAL: NOT AT ALL
6. FEELING BAD ABOUT YOURSELF - OR THAT YOU ARE A FAILURE OR HAVE LET YOURSELF OR YOUR FAMILY DOWN: NOT AT ALL
7. TROUBLE CONCENTRATING ON THINGS, SUCH AS READING THE NEWSPAPER OR WATCHING TELEVISION: NOT AT ALL
10. IF YOU CHECKED OFF ANY PROBLEMS, HOW DIFFICULT HAVE THESE PROBLEMS MADE IT FOR YOU TO DO YOUR WORK, TAKE CARE OF THINGS AT HOME, OR GET ALONG WITH OTHER PEOPLE: NOT DIFFICULT AT ALL
7. TROUBLE CONCENTRATING ON THINGS, SUCH AS READING THE NEWSPAPER OR WATCHING TELEVISION: NOT AT ALL
SUM OF ALL RESPONSES TO PHQ QUESTIONS 1-9: 0
6. FEELING BAD ABOUT YOURSELF - OR THAT YOU ARE A FAILURE OR HAVE LET YOURSELF OR YOUR FAMILY DOWN: NOT AT ALL
10. IF YOU CHECKED OFF ANY PROBLEMS, HOW DIFFICULT HAVE THESE PROBLEMS MADE IT FOR YOU TO DO YOUR WORK, TAKE CARE OF THINGS AT HOME, OR GET ALONG WITH OTHER PEOPLE: NOT DIFFICULT AT ALL
5. POOR APPETITE OR OVEREATING: NOT AT ALL
SUM OF ALL RESPONSES TO PHQ QUESTIONS 1-9: 0
3. TROUBLE FALLING OR STAYING ASLEEP: NOT AT ALL
4. FEELING TIRED OR HAVING LITTLE ENERGY: NOT AT ALL
9. THOUGHTS THAT YOU WOULD BE BETTER OFF DEAD, OR OF HURTING YOURSELF: NOT AT ALL
5. POOR APPETITE OR OVEREATING: NOT AT ALL
10. IF YOU CHECKED OFF ANY PROBLEMS, HOW DIFFICULT HAVE THESE PROBLEMS MADE IT FOR YOU TO DO YOUR WORK, TAKE CARE OF THINGS AT HOME, OR GET ALONG WITH OTHER PEOPLE: NOT DIFFICULT AT ALL
2. FEELING DOWN, DEPRESSED OR HOPELESS: NOT AT ALL
SUM OF ALL RESPONSES TO PHQ QUESTIONS 1-9: 0
4. FEELING TIRED OR HAVING LITTLE ENERGY: NOT AT ALL
SUM OF ALL RESPONSES TO PHQ QUESTIONS 1-9: 0
3. TROUBLE FALLING OR STAYING ASLEEP: NOT AT ALL
8. MOVING OR SPEAKING SO SLOWLY THAT OTHER PEOPLE COULD HAVE NOTICED. OR THE OPPOSITE, BEING SO FIGETY OR RESTLESS THAT YOU HAVE BEEN MOVING AROUND A LOT MORE THAN USUAL: NOT AT ALL
9. THOUGHTS THAT YOU WOULD BE BETTER OFF DEAD, OR OF HURTING YOURSELF: NOT AT ALL
8. MOVING OR SPEAKING SO SLOWLY THAT OTHER PEOPLE COULD HAVE NOTICED. OR THE OPPOSITE - BEING SO FIDGETY OR RESTLESS THAT YOU HAVE BEEN MOVING AROUND A LOT MORE THAN USUAL: NOT AT ALL
3. TROUBLE FALLING OR STAYING ASLEEP: NOT AT ALL
9. THOUGHTS THAT YOU WOULD BE BETTER OFF DEAD, OR OF HURTING YOURSELF: NOT AT ALL
6. FEELING BAD ABOUT YOURSELF - OR THAT YOU ARE A FAILURE OR HAVE LET YOURSELF OR YOUR FAMILY DOWN: NOT AT ALL
SUM OF ALL RESPONSES TO PHQ QUESTIONS 1-9: 0
3. TROUBLE FALLING OR STAYING ASLEEP: NOT AT ALL
10. IF YOU CHECKED OFF ANY PROBLEMS, HOW DIFFICULT HAVE THESE PROBLEMS MADE IT FOR YOU TO DO YOUR WORK, TAKE CARE OF THINGS AT HOME, OR GET ALONG WITH OTHER PEOPLE: NOT DIFFICULT AT ALL
5. POOR APPETITE OR OVEREATING: NOT AT ALL
7. TROUBLE CONCENTRATING ON THINGS, SUCH AS READING THE NEWSPAPER OR WATCHING TELEVISION: NOT AT ALL
9. THOUGHTS THAT YOU WOULD BE BETTER OFF DEAD, OR OF HURTING YOURSELF: NOT AT ALL
SUM OF ALL RESPONSES TO PHQ QUESTIONS 1-9: 0
4. FEELING TIRED OR HAVING LITTLE ENERGY: NOT AT ALL
SUM OF ALL RESPONSES TO PHQ QUESTIONS 1-9: 0
1. LITTLE INTEREST OR PLEASURE IN DOING THINGS: NOT AT ALL
1. LITTLE INTEREST OR PLEASURE IN DOING THINGS: NOT AT ALL
2. FEELING DOWN, DEPRESSED OR HOPELESS: NOT AT ALL
2. FEELING DOWN, DEPRESSED OR HOPELESS: NOT AT ALL
8. MOVING OR SPEAKING SO SLOWLY THAT OTHER PEOPLE COULD HAVE NOTICED. OR THE OPPOSITE, BEING SO FIGETY OR RESTLESS THAT YOU HAVE BEEN MOVING AROUND A LOT MORE THAN USUAL: NOT AT ALL
1. LITTLE INTEREST OR PLEASURE IN DOING THINGS: NOT AT ALL
7. TROUBLE CONCENTRATING ON THINGS, SUCH AS READING THE NEWSPAPER OR WATCHING TELEVISION: NOT AT ALL
2. FEELING DOWN, DEPRESSED OR HOPELESS: NOT AT ALL
SUM OF ALL RESPONSES TO PHQ QUESTIONS 1-9: 0
SUM OF ALL RESPONSES TO PHQ QUESTIONS 1-9: 0

## 2025-05-05 ENCOUNTER — OFFICE VISIT (OUTPATIENT)
Dept: PRIMARY CARE CLINIC | Age: 35
End: 2025-05-05
Payer: COMMERCIAL

## 2025-05-05 VITALS
WEIGHT: 112.2 LBS | OXYGEN SATURATION: 98 % | DIASTOLIC BLOOD PRESSURE: 80 MMHG | BODY MASS INDEX: 22.62 KG/M2 | SYSTOLIC BLOOD PRESSURE: 112 MMHG | HEIGHT: 59 IN | HEART RATE: 70 BPM

## 2025-05-05 DIAGNOSIS — R91.8 LUNG NODULES: Primary | ICD-10-CM

## 2025-05-05 DIAGNOSIS — G43.009 MIGRAINE WITHOUT AURA AND WITHOUT STATUS MIGRAINOSUS, NOT INTRACTABLE: ICD-10-CM

## 2025-05-05 DIAGNOSIS — K21.9 GASTROESOPHAGEAL REFLUX DISEASE WITHOUT ESOPHAGITIS: ICD-10-CM

## 2025-05-05 DIAGNOSIS — R07.2 PRECORDIAL PAIN: ICD-10-CM

## 2025-05-05 DIAGNOSIS — N94.9 ADNEXAL CYST: ICD-10-CM

## 2025-05-05 DIAGNOSIS — K58.2 IRRITABLE BOWEL SYNDROME WITH BOTH CONSTIPATION AND DIARRHEA: ICD-10-CM

## 2025-05-05 PROBLEM — Z34.90 SUPERVISION OF NORMAL PREGNANCY: Status: RESOLVED | Noted: 2017-11-24 | Resolved: 2025-05-05

## 2025-05-05 PROBLEM — R25.2 MUSCLE CRAMPS: Status: RESOLVED | Noted: 2020-11-11 | Resolved: 2025-05-05

## 2025-05-05 PROBLEM — R00.2 HEART PALPITATIONS: Status: RESOLVED | Noted: 2017-11-24 | Resolved: 2025-05-05

## 2025-05-05 PROBLEM — R51.9 HEADACHE: Status: RESOLVED | Noted: 2018-11-05 | Resolved: 2025-05-05

## 2025-05-05 PROBLEM — K52.9 CHRONIC DIARRHEA OF UNKNOWN ORIGIN: Status: RESOLVED | Noted: 2017-11-24 | Resolved: 2025-05-05

## 2025-05-05 PROBLEM — G44.209 TENSION HEADACHE: Status: RESOLVED | Noted: 2020-11-11 | Resolved: 2025-05-05

## 2025-05-05 PROBLEM — J06.9 URI WITH COUGH AND CONGESTION: Status: RESOLVED | Noted: 2023-12-15 | Resolved: 2025-05-05

## 2025-05-05 PROBLEM — O34.219 PREVIOUS CESAREAN DELIVERY AFFECTING PREGNANCY: Status: RESOLVED | Noted: 2017-09-25 | Resolved: 2025-05-05

## 2025-05-05 PROBLEM — O99.810 ABNORMAL GLUCOSE TOLERANCE IN MOTHER COMPLICATING PREGNANCY: Status: RESOLVED | Noted: 2018-02-12 | Resolved: 2025-05-05

## 2025-05-05 PROBLEM — R63.6 PATIENT UNDERWEIGHT: Status: RESOLVED | Noted: 2018-08-24 | Resolved: 2025-05-05

## 2025-05-05 PROCEDURE — 4004F PT TOBACCO SCREEN RCVD TLK: CPT | Performed by: STUDENT IN AN ORGANIZED HEALTH CARE EDUCATION/TRAINING PROGRAM

## 2025-05-05 PROCEDURE — G8420 CALC BMI NORM PARAMETERS: HCPCS | Performed by: STUDENT IN AN ORGANIZED HEALTH CARE EDUCATION/TRAINING PROGRAM

## 2025-05-05 PROCEDURE — G8427 DOCREV CUR MEDS BY ELIG CLIN: HCPCS | Performed by: STUDENT IN AN ORGANIZED HEALTH CARE EDUCATION/TRAINING PROGRAM

## 2025-05-05 PROCEDURE — 99214 OFFICE O/P EST MOD 30 MIN: CPT | Performed by: STUDENT IN AN ORGANIZED HEALTH CARE EDUCATION/TRAINING PROGRAM

## 2025-05-05 RX ORDER — DICYCLOMINE HCL 20 MG
20 TABLET ORAL 4 TIMES DAILY PRN
Qty: 30 TABLET | Refills: 2 | Status: SHIPPED | OUTPATIENT
Start: 2025-05-05

## 2025-05-05 RX ORDER — POLYETHYLENE GLYCOL 3350 17 G/17G
17 POWDER, FOR SOLUTION ORAL DAILY
Qty: 1530 G | Refills: 1 | Status: SHIPPED | OUTPATIENT
Start: 2025-05-05 | End: 2025-11-01

## 2025-05-05 RX ORDER — NITROGLYCERIN 0.4 MG/1
0.4 TABLET SUBLINGUAL EVERY 5 MIN PRN
Qty: 25 TABLET | Refills: 0 | Status: SHIPPED | OUTPATIENT
Start: 2025-05-05 | End: 2025-08-03

## 2025-05-05 RX ORDER — OMEPRAZOLE 20 MG/1
20 CAPSULE, DELAYED RELEASE ORAL
Qty: 90 CAPSULE | Refills: 3 | Status: SHIPPED | OUTPATIENT
Start: 2025-05-05

## 2025-05-05 RX ORDER — ONDANSETRON 8 MG/1
4 TABLET, FILM COATED ORAL EVERY 8 HOURS PRN
Qty: 30 TABLET | Refills: 2 | Status: SHIPPED | OUTPATIENT
Start: 2025-05-05 | End: 2025-05-05

## 2025-05-05 RX ORDER — ONDANSETRON 4 MG/1
4 TABLET, FILM COATED ORAL EVERY 8 HOURS PRN
Qty: 30 TABLET | Refills: 2 | Status: SHIPPED | OUTPATIENT
Start: 2025-05-05

## 2025-05-05 NOTE — ASSESSMENT & PLAN NOTE
Chronic; stable   4mm  Found incidentally on CT for flank pain   - is following with lung nodule clinic   + smoker and mother has lung cancer

## 2025-05-05 NOTE — PROGRESS NOTES
[facility]  [address]  [address]  [place] [address]  Dept: [phone]  Dept Fax: [phone]  Loc: [phone]     5/5/2025    Visit type: Office visit    Reason for Visit: Follow-up (Discuss results ) and Dizziness (Still having feels short of breath )       ASSESSMENT/PLAN   1. Lung nodules  Assessment & Plan:  Chronic; stable   4mm  Found incidentally on CT for flank pain   - is following with lung nodule clinic   + smoker and mother has lung cancer   2. Precordial pain  -     nitroGLYCERIN (NITROSTAT) 0.4 MG SL tablet; Place 1 tablet under the tongue every 5 minutes as needed for Chest pain, Disp-25 tablet, R-0Normal  3. Gastroesophageal reflux disease without esophagitis  Assessment & Plan:  Chronic; stable   Orders:  -     omeprazole (PRILOSEC) 20 MG delayed release capsule; Take 1 capsule by mouth every morning (before breakfast), Disp-90 capsule, R-3Normal  4. Adnexal cyst  Assessment & Plan:  - found incidentally   Will follow with [facility]   5. Irritable bowel syndrome with both constipation and diarrhea  Assessment & Plan:  Chronic; uncontrolled   - will refill miralax and bentyl prn   Orders:  -     dicyclomine (BENTYL) 20 MG tablet; Take 1 tablet by mouth 4 times daily as needed (abd pain), Disp-30 tablet, R-2Normal  -     polyethylene glycol (GLYCOLAX) 17 GM/SCOOP powder; Take 17 g by mouth daily, Disp-1530 g, R-1Normal  6. Migraine without aura and without status migrainosus, not intractable  -     ondansetron (ZOFRAN) 4 MG tablet; Take 1 tablet by mouth every 8 hours as needed for Nausea or Vomiting, Disp-30 tablet, R-2Normal      No follow-ups on file.  Orders Placed This Encounter   Medications    nitroGLYCERIN (NITROSTAT) 0.4 MG SL tablet     Sig: Place 1 tablet under the tongue every 5 minutes as needed for Chest pain     Dispense:  25 tablet     Refill:  0    omeprazole (PRILOSEC) 20 MG delayed release capsule

## 2025-05-07 ENCOUNTER — OFFICE VISIT (OUTPATIENT)
Dept: OBGYN CLINIC | Age: 35
End: 2025-05-07
Payer: COMMERCIAL

## 2025-05-07 VITALS
DIASTOLIC BLOOD PRESSURE: 66 MMHG | HEIGHT: 59 IN | HEART RATE: 79 BPM | SYSTOLIC BLOOD PRESSURE: 100 MMHG | BODY MASS INDEX: 22.58 KG/M2 | WEIGHT: 112 LBS

## 2025-05-07 DIAGNOSIS — Z90.710 HX OF HYSTERECTOMY: ICD-10-CM

## 2025-05-07 DIAGNOSIS — R23.2 HOT FLASHES: Primary | ICD-10-CM

## 2025-05-07 DIAGNOSIS — R10.2 PELVIC PAIN: ICD-10-CM

## 2025-05-07 PROCEDURE — 99214 OFFICE O/P EST MOD 30 MIN: CPT | Performed by: OBSTETRICS & GYNECOLOGY

## 2025-05-07 PROCEDURE — G8420 CALC BMI NORM PARAMETERS: HCPCS | Performed by: OBSTETRICS & GYNECOLOGY

## 2025-05-07 PROCEDURE — G8427 DOCREV CUR MEDS BY ELIG CLIN: HCPCS | Performed by: OBSTETRICS & GYNECOLOGY

## 2025-05-07 PROCEDURE — 4004F PT TOBACCO SCREEN RCVD TLK: CPT | Performed by: OBSTETRICS & GYNECOLOGY

## 2025-05-08 DIAGNOSIS — R23.2 HOT FLASHES: ICD-10-CM

## 2025-05-08 DIAGNOSIS — Z90.710 HX OF HYSTERECTOMY: ICD-10-CM

## 2025-05-08 DIAGNOSIS — R10.2 PELVIC PAIN: ICD-10-CM

## 2025-05-08 LAB
T4 FREE SERPL-MCNC: 1.33 NG/DL (ref 0.84–1.68)
TSH REFLEX: 0.66 UIU/ML (ref 0.44–3.86)

## 2025-05-09 LAB
ESTRADIOL LEVEL: 93.4 PG/ML
FOLLICLE STIMULATING HORMONE: 7.4 MIU/ML
LH: 9.8 MIU/ML (ref 1.7–8.6)

## 2025-05-12 NOTE — PROGRESS NOTES
Laly Joyner is a 35 y.o. female who presents here today for complaints of Hot Flashes and Results (CT done 25. Ovarian cyst found and the patient is having pain in the abdominal area constantly.)        History of Present Illness  The patient presents for evaluation of hot flashes and pelvic pain.    She reports experiencing sudden episodes of hot flashes, which she describes as a sensation of being suffocated. These episodes occur sporadically but occur daily. Additionally, she experiences a dull, aching pain localized to the left side of her pelvis, described as a muscle-type ache that can be felt through the skin. She has a known history of a cyst on her left ovary. A hysterectomy was performed in the past, with her ovaries left intact.    PAST MEDICAL HISTORY: Fluid backed up into kidneys due to a kidney stone in the right kidney, present for over 10 years.    PAST SURGICAL HISTORY: Hysterectomy    FAMILY HISTORY  Her mother passed away 3 years ago.         Vitals:  /66 (BP Site: Right Upper Arm, Patient Position: Sitting, BP Cuff Size: Medium Adult)   Pulse 79   Ht 1.499 m (4' 11\")   Wt 50.8 kg (112 lb)   LMP 2020   BMI 22.62 kg/m²   Allergies:  Acyclovir and related, Ibuprofen, Morphine, Pcn [penicillins], Amoxicillin, Cephalexin, Macrobid [nitrofurantoin macrocrystal], Hydrocodone-acetaminophen, and Oxycodone-acetaminophen  Past Medical History:   Diagnosis Date    Anemia     Asthma     Congenital anomaly of corpus callosum (HCC)     Encephalopathy     Intractable migraine without aura and without status migrainosus 2017    Kidney stones     Menopausal symptoms     Micrognathia     Postpartum depression 2018    Precordial pain 2019    Unspecified asthma(493.90)      Past Surgical History:   Procedure Laterality Date     SECTION      DILATION AND CURETTAGE      DILATION AND CURETTAGE OF UTERUS N/A 2019    SUCTION DILATATION AND CURETTAGE

## 2025-05-13 LAB — ESTRONE SERPL-MCNC: 42.2 PG/ML

## 2025-05-14 ENCOUNTER — HOSPITAL ENCOUNTER (OUTPATIENT)
Dept: ULTRASOUND IMAGING | Age: 35
Discharge: HOME | End: 2025-05-16
Attending: OBSTETRICS & GYNECOLOGY
Payer: COMMERCIAL

## 2025-05-14 DIAGNOSIS — Z90.710 HX OF HYSTERECTOMY: ICD-10-CM

## 2025-05-14 DIAGNOSIS — R23.2 HOT FLASHES: ICD-10-CM

## 2025-05-14 DIAGNOSIS — R10.2 PELVIC PAIN: ICD-10-CM

## 2025-05-14 PROCEDURE — 76856 US EXAM PELVIC COMPLETE: CPT

## 2025-05-14 PROCEDURE — 93975 VASCULAR STUDY: CPT

## 2025-05-14 PROCEDURE — 76830 TRANSVAGINAL US NON-OB: CPT

## 2025-05-19 ENCOUNTER — OFFICE VISIT (OUTPATIENT)
Age: 35
End: 2025-05-19
Payer: COMMERCIAL

## 2025-05-19 VITALS
SYSTOLIC BLOOD PRESSURE: 110 MMHG | HEART RATE: 65 BPM | OXYGEN SATURATION: 94 % | DIASTOLIC BLOOD PRESSURE: 70 MMHG | BODY MASS INDEX: 22.62 KG/M2 | RESPIRATION RATE: 16 BRPM | WEIGHT: 112 LBS

## 2025-05-19 DIAGNOSIS — K58.2 IRRITABLE BOWEL SYNDROME WITH BOTH CONSTIPATION AND DIARRHEA: ICD-10-CM

## 2025-05-19 DIAGNOSIS — R00.2 PALPITATIONS: Primary | ICD-10-CM

## 2025-05-19 DIAGNOSIS — R07.2 PRECORDIAL PAIN: ICD-10-CM

## 2025-05-19 PROCEDURE — 99213 OFFICE O/P EST LOW 20 MIN: CPT

## 2025-05-19 PROCEDURE — 4004F PT TOBACCO SCREEN RCVD TLK: CPT

## 2025-05-19 PROCEDURE — G8427 DOCREV CUR MEDS BY ELIG CLIN: HCPCS

## 2025-05-19 PROCEDURE — G8420 CALC BMI NORM PARAMETERS: HCPCS

## 2025-05-19 PROCEDURE — 99212 OFFICE O/P EST SF 10 MIN: CPT

## 2025-05-19 RX ORDER — POLYETHYLENE GLYCOL 3350 17 G/17G
17 POWDER, FOR SOLUTION ORAL DAILY
Qty: 1530 G | Refills: 1 | Status: SHIPPED | OUTPATIENT
Start: 2025-05-19 | End: 2025-11-15

## 2025-05-19 RX ORDER — RANOLAZINE 500 MG/1
TABLET, EXTENDED RELEASE ORAL
Qty: 180 TABLET | Refills: 3 | Status: SHIPPED | OUTPATIENT
Start: 2025-05-19

## 2025-05-19 ASSESSMENT — ENCOUNTER SYMPTOMS
SINUS PRESSURE: 0
EYE PAIN: 0
NAUSEA: 0
SINUS PAIN: 0
CONSTIPATION: 0
PHOTOPHOBIA: 0
EYE ITCHING: 0
ALLERGIC/IMMUNOLOGIC NEGATIVE: 1
CHOKING: 0
VOICE CHANGE: 0
APNEA: 0
COUGH: 0
ABDOMINAL DISTENTION: 0
BLOOD IN STOOL: 0
FACIAL SWELLING: 0
BACK PAIN: 0
RHINORRHEA: 0
SHORTNESS OF BREATH: 1
DIARRHEA: 0
VOMITING: 0
STRIDOR: 0
SORE THROAT: 0
EYE DISCHARGE: 0
TROUBLE SWALLOWING: 0
WHEEZING: 0
ABDOMINAL PAIN: 0
CHEST TIGHTNESS: 1
COLOR CHANGE: 0
EYE REDNESS: 0

## 2025-05-19 NOTE — PROGRESS NOTES
Patient: Laly Joyner  YOB: 1990  MRN: [id number]    Chief Complaint:   Chief Complaint   Patient presents with    Follow-up     Echo, Stress test & monitor results         Subjective/HPI   3/13/2023: Patient with past medical history significant for asthma, kidney stones, postpartum depression, encephalopathy, anemia, chronic migraines, angina.  Been seen in this office since 2019.  Patient had negative stress echo in 2020.  Patient seen in office today for hospital discharge follow-up.  Patient was recently seen in the emergency department with complaints of midsternal chest pain.  Patient reports intermittent episodes of chest pain.  Reports recently she was sitting on the couch and experienced a sudden, sharp, electricity type chest pain on the left side of her chest that went straight through to her back.  Symptoms lasted for a few seconds and then spontaneously resolved.  Patient has taken nitro a few times in the past few months for chest pain.  She also reports feeling very short of breath with minimal exertion.  States she feels short of breath while talking to me in the office today.  No conversational dyspnea noted.    Pt denies  nausea, vomiting, diarrhea, constipation, motor weakness, insomnia, weight loss, syncope, dizziness, lightheadedness, palpitations, PND, orthopnea, or claudication. No bleeding issues. Pt denies any CHF type symptoms.  No recent hospitalizations.  Pt is compliant with all Rx medications.  Blood pressure and heart rate are under control.       1-:   +strong family history of CAD  +tobacco abuse, anemia, angina  No history of HTN, HLD, DM  Patient  here today for follow up. Having dizzy spells. Does report syncopal episode about to weeks ago. Reports just sitting on the couch and becomes dizzy then passes out. Doesn't remember much after that.   Reports increase in fatigue.   Reports + shortness of breath with exertion. Reports difficulty even going up a

## 2025-05-21 ENCOUNTER — OFFICE VISIT (OUTPATIENT)
Dept: OBGYN CLINIC | Age: 35
End: 2025-05-21
Payer: COMMERCIAL

## 2025-05-21 VITALS
SYSTOLIC BLOOD PRESSURE: 94 MMHG | HEART RATE: 76 BPM | HEIGHT: 59 IN | WEIGHT: 118 LBS | BODY MASS INDEX: 23.79 KG/M2 | DIASTOLIC BLOOD PRESSURE: 60 MMHG

## 2025-05-21 DIAGNOSIS — R23.2 HOT FLASHES: Primary | ICD-10-CM

## 2025-05-21 DIAGNOSIS — R10.2 PELVIC PAIN: ICD-10-CM

## 2025-05-21 DIAGNOSIS — Z90.710 HX OF HYSTERECTOMY: ICD-10-CM

## 2025-05-21 PROCEDURE — G8427 DOCREV CUR MEDS BY ELIG CLIN: HCPCS | Performed by: OBSTETRICS & GYNECOLOGY

## 2025-05-21 PROCEDURE — 99213 OFFICE O/P EST LOW 20 MIN: CPT | Performed by: OBSTETRICS & GYNECOLOGY

## 2025-05-21 PROCEDURE — G8420 CALC BMI NORM PARAMETERS: HCPCS | Performed by: OBSTETRICS & GYNECOLOGY

## 2025-05-21 PROCEDURE — 4004F PT TOBACCO SCREEN RCVD TLK: CPT | Performed by: OBSTETRICS & GYNECOLOGY

## 2025-05-27 ENCOUNTER — PATIENT MESSAGE (OUTPATIENT)
Age: 35
End: 2025-05-27

## 2025-06-03 DIAGNOSIS — R07.2 PRECORDIAL PAIN: ICD-10-CM

## 2025-06-03 RX ORDER — NITROGLYCERIN 0.4 MG/1
0.4 TABLET SUBLINGUAL EVERY 5 MIN PRN
Qty: 25 TABLET | Refills: 0 | Status: SHIPPED | OUTPATIENT
Start: 2025-06-03 | End: 2025-09-01

## 2025-06-06 ENCOUNTER — HOSPITAL ENCOUNTER (OUTPATIENT)
Dept: CARDIOLOGY | Facility: HOSPITAL | Age: 35
Discharge: HOME | End: 2025-06-06
Payer: COMMERCIAL

## 2025-06-06 DIAGNOSIS — R00.2 PALPITATIONS: ICD-10-CM

## 2025-06-06 PROCEDURE — 2500000001 HC RX 250 WO HCPCS SELF ADMINISTERED DRUGS (ALT 637 FOR MEDICARE OP): Performed by: INTERNAL MEDICINE

## 2025-06-06 PROCEDURE — 93660 TILT TABLE EVALUATION: CPT

## 2025-06-06 RX ORDER — NITROGLYCERIN 0.4 MG/1
TABLET SUBLINGUAL AS NEEDED
Status: DISCONTINUED | OUTPATIENT
Start: 2025-06-06 | End: 2025-06-06 | Stop reason: HOSPADM

## 2025-06-06 RX ADMIN — NITROGLYCERIN 0.3 MG: 0.4 TABLET SUBLINGUAL at 11:27

## 2025-06-19 DIAGNOSIS — R00.2 PALPITATIONS: ICD-10-CM

## 2025-06-23 ENCOUNTER — OFFICE VISIT (OUTPATIENT)
Age: 35
End: 2025-06-23
Payer: COMMERCIAL

## 2025-06-23 VITALS
OXYGEN SATURATION: 98 % | HEART RATE: 72 BPM | DIASTOLIC BLOOD PRESSURE: 70 MMHG | WEIGHT: 117 LBS | SYSTOLIC BLOOD PRESSURE: 118 MMHG | BODY MASS INDEX: 23.63 KG/M2

## 2025-06-23 DIAGNOSIS — R94.09 ABNORMAL TILT TABLE TEST: Primary | ICD-10-CM

## 2025-06-23 DIAGNOSIS — Z71.2 ENCOUNTER TO DISCUSS TEST RESULTS: ICD-10-CM

## 2025-06-23 PROCEDURE — 99214 OFFICE O/P EST MOD 30 MIN: CPT

## 2025-06-23 PROCEDURE — G8427 DOCREV CUR MEDS BY ELIG CLIN: HCPCS

## 2025-06-23 PROCEDURE — G8420 CALC BMI NORM PARAMETERS: HCPCS

## 2025-06-23 PROCEDURE — 99213 OFFICE O/P EST LOW 20 MIN: CPT

## 2025-06-23 PROCEDURE — 4004F PT TOBACCO SCREEN RCVD TLK: CPT

## 2025-06-23 RX ORDER — OLANZAPINE 10 MG/1
10 TABLET, FILM COATED ORAL NIGHTLY
COMMUNITY
Start: 2025-06-02

## 2025-06-23 RX ORDER — LAMOTRIGINE 100 MG/1
100 TABLET ORAL NIGHTLY
COMMUNITY
Start: 2025-06-02

## 2025-06-23 ASSESSMENT — ENCOUNTER SYMPTOMS
PHOTOPHOBIA: 0
CHEST TIGHTNESS: 1
CHOKING: 0
SORE THROAT: 0
VOMITING: 0
EYE ITCHING: 0
EYE PAIN: 0
RHINORRHEA: 0
SINUS PAIN: 0
WHEEZING: 0
DIARRHEA: 0
COUGH: 0
ALLERGIC/IMMUNOLOGIC NEGATIVE: 1
VOICE CHANGE: 0
CONSTIPATION: 0
TROUBLE SWALLOWING: 0
STRIDOR: 0
EYE REDNESS: 0
COLOR CHANGE: 0
SINUS PRESSURE: 0
ABDOMINAL PAIN: 0
NAUSEA: 0
APNEA: 0
FACIAL SWELLING: 0
ABDOMINAL DISTENTION: 0
BLOOD IN STOOL: 0
SHORTNESS OF BREATH: 1
BACK PAIN: 0
EYE DISCHARGE: 0

## 2025-06-23 NOTE — PROGRESS NOTES
Patient: Laly Joyner  YOB: 1990  MRN: [id number]    Chief Complaint:   Chief Complaint   Patient presents with    Results     TILT TABLE STUDY         Subjective/HPI   3/13/2023: Patient with past medical history significant for asthma, kidney stones, postpartum depression, encephalopathy, anemia, chronic migraines, angina.  Been seen in this office since 2019.  Patient had negative stress echo in 2020.  Patient seen in office today for hospital discharge follow-up.  Patient was recently seen in the emergency department with complaints of midsternal chest pain.  Patient reports intermittent episodes of chest pain.  Reports recently she was sitting on the couch and experienced a sudden, sharp, electricity type chest pain on the left side of her chest that went straight through to her back.  Symptoms lasted for a few seconds and then spontaneously resolved.  Patient has taken nitro a few times in the past few months for chest pain.  She also reports feeling very short of breath with minimal exertion.  States she feels short of breath while talking to me in the office today.  No conversational dyspnea noted.    Pt denies  nausea, vomiting, diarrhea, constipation, motor weakness, insomnia, weight loss, syncope, dizziness, lightheadedness, palpitations, PND, orthopnea, or claudication. No bleeding issues. Pt denies any CHF type symptoms.  No recent hospitalizations.  Pt is compliant with all Rx medications.  Blood pressure and heart rate are under control.       1-:   +strong family history of CAD  +tobacco abuse, anemia, angina  No history of HTN, HLD, DM  Patient  here today for follow up. Having dizzy spells. Does report syncopal episode about to weeks ago. Reports just sitting on the couch and becomes dizzy then passes out. Doesn't remember much after that.   Reports increase in fatigue.   Reports + shortness of breath with exertion. Reports difficulty even going up a few flights of

## 2025-07-02 DIAGNOSIS — R07.2 PRECORDIAL PAIN: ICD-10-CM

## 2025-07-02 RX ORDER — NITROGLYCERIN 0.4 MG/1
0.4 TABLET SUBLINGUAL EVERY 5 MIN PRN
Qty: 75 TABLET | Refills: 1 | OUTPATIENT
Start: 2025-07-02 | End: 2025-09-30

## 2025-08-04 ENCOUNTER — TELEPHONE (OUTPATIENT)
Age: 35
End: 2025-08-04

## 2025-08-06 ENCOUNTER — OFFICE VISIT (OUTPATIENT)
Age: 35
End: 2025-08-06
Payer: COMMERCIAL

## 2025-08-06 VITALS
HEART RATE: 64 BPM | WEIGHT: 123 LBS | SYSTOLIC BLOOD PRESSURE: 100 MMHG | BODY MASS INDEX: 24.84 KG/M2 | DIASTOLIC BLOOD PRESSURE: 62 MMHG

## 2025-08-06 DIAGNOSIS — G43.719 INTRACTABLE CHRONIC MIGRAINE WITHOUT AURA AND WITHOUT STATUS MIGRAINOSUS: Primary | ICD-10-CM

## 2025-08-06 DIAGNOSIS — G44.86 CERVICOGENIC HEADACHE: ICD-10-CM

## 2025-08-06 DIAGNOSIS — F51.01 PRIMARY INSOMNIA: ICD-10-CM

## 2025-08-06 DIAGNOSIS — M79.10 MYALGIA: ICD-10-CM

## 2025-08-06 PROCEDURE — 4004F PT TOBACCO SCREEN RCVD TLK: CPT | Performed by: PSYCHIATRY & NEUROLOGY

## 2025-08-06 PROCEDURE — 99203 OFFICE O/P NEW LOW 30 MIN: CPT | Performed by: PSYCHIATRY & NEUROLOGY

## 2025-08-06 PROCEDURE — G8420 CALC BMI NORM PARAMETERS: HCPCS | Performed by: PSYCHIATRY & NEUROLOGY

## 2025-08-06 PROCEDURE — 99204 OFFICE O/P NEW MOD 45 MIN: CPT | Performed by: PSYCHIATRY & NEUROLOGY

## 2025-08-06 PROCEDURE — G8427 DOCREV CUR MEDS BY ELIG CLIN: HCPCS | Performed by: PSYCHIATRY & NEUROLOGY

## 2025-08-06 RX ORDER — UBROGEPANT 100 MG/1
1 TABLET ORAL PRN
Qty: 16 TABLET | Refills: 3 | Status: SHIPPED | OUTPATIENT
Start: 2025-08-06

## 2025-08-06 RX ORDER — ERENUMAB-AOOE 140 MG/ML
140 INJECTION, SOLUTION SUBCUTANEOUS
Qty: 1 ADJUSTABLE DOSE PRE-FILLED PEN SYRINGE | Refills: 5 | Status: SHIPPED | OUTPATIENT
Start: 2025-08-06

## 2025-08-13 ENCOUNTER — OFFICE VISIT (OUTPATIENT)
Age: 35
End: 2025-08-13
Payer: COMMERCIAL

## 2025-08-13 VITALS
WEIGHT: 123.6 LBS | OXYGEN SATURATION: 97 % | BODY MASS INDEX: 24.96 KG/M2 | HEART RATE: 67 BPM | DIASTOLIC BLOOD PRESSURE: 80 MMHG | SYSTOLIC BLOOD PRESSURE: 118 MMHG

## 2025-08-13 DIAGNOSIS — Z09 FOLLOW-UP EXAMINATION: Primary | ICD-10-CM

## 2025-08-13 PROCEDURE — 99213 OFFICE O/P EST LOW 20 MIN: CPT

## 2025-08-13 ASSESSMENT — ENCOUNTER SYMPTOMS
CHOKING: 0
EYE PAIN: 0
SHORTNESS OF BREATH: 1
FACIAL SWELLING: 0
SINUS PRESSURE: 0
NAUSEA: 0
RHINORRHEA: 0
BLOOD IN STOOL: 0
BACK PAIN: 0
DIARRHEA: 0
STRIDOR: 0
VOMITING: 0
CHEST TIGHTNESS: 1
COUGH: 0
WHEEZING: 0
CONSTIPATION: 0
EYE REDNESS: 0
ABDOMINAL PAIN: 0
VOICE CHANGE: 0
TROUBLE SWALLOWING: 0
ABDOMINAL DISTENTION: 0
COLOR CHANGE: 0
PHOTOPHOBIA: 0
ALLERGIC/IMMUNOLOGIC NEGATIVE: 1
SINUS PAIN: 0
EYE DISCHARGE: 0
EYE ITCHING: 0
SORE THROAT: 0
APNEA: 0

## 2025-09-03 ENCOUNTER — HOSPITAL ENCOUNTER (EMERGENCY)
Age: 35
Discharge: HOME | End: 2025-09-03
Payer: COMMERCIAL

## 2025-09-03 ENCOUNTER — APPOINTMENT (OUTPATIENT)
Dept: GENERAL RADIOLOGY | Age: 35
End: 2025-09-03
Payer: COMMERCIAL

## 2025-09-03 VITALS
BODY MASS INDEX: 24.84 KG/M2 | DIASTOLIC BLOOD PRESSURE: 90 MMHG | HEART RATE: 78 BPM | OXYGEN SATURATION: 95 % | RESPIRATION RATE: 18 BRPM | SYSTOLIC BLOOD PRESSURE: 120 MMHG | TEMPERATURE: 98.4 F | WEIGHT: 123 LBS

## 2025-09-03 DIAGNOSIS — J06.9 ACUTE UPPER RESPIRATORY INFECTION: Primary | ICD-10-CM

## 2025-09-03 LAB
SARS-COV-2 RDRP RESP QL NAA+PROBE: NOT DETECTED
STREP GRP A PCR: NEGATIVE

## 2025-09-03 PROCEDURE — 87651 STREP A DNA AMP PROBE: CPT

## 2025-09-03 PROCEDURE — 99284 EMERGENCY DEPT VISIT MOD MDM: CPT

## 2025-09-03 PROCEDURE — 71046 X-RAY EXAM CHEST 2 VIEWS: CPT

## 2025-09-03 PROCEDURE — 87635 SARS-COV-2 COVID-19 AMP PRB: CPT

## 2025-09-03 RX ORDER — BENZONATATE 100 MG/1
100-200 CAPSULE ORAL 3 TIMES DAILY PRN
Qty: 20 CAPSULE | Refills: 0 | Status: SHIPPED | OUTPATIENT
Start: 2025-09-03 | End: 2025-09-10

## 2025-09-03 RX ORDER — PREDNISONE 10 MG/1
20 TABLET ORAL 2 TIMES DAILY
Qty: 20 TABLET | Refills: 0 | Status: SHIPPED | OUTPATIENT
Start: 2025-09-03 | End: 2025-09-08

## 2025-09-03 ASSESSMENT — ENCOUNTER SYMPTOMS
PHOTOPHOBIA: 0
COUGH: 1
NAUSEA: 0
SORE THROAT: 0
RHINORRHEA: 0
VOMITING: 0
EYE PAIN: 0
SHORTNESS OF BREATH: 0
DIARRHEA: 0
ABDOMINAL PAIN: 0
BACK PAIN: 0

## 2025-09-03 ASSESSMENT — LIFESTYLE VARIABLES
HOW MANY STANDARD DRINKS CONTAINING ALCOHOL DO YOU HAVE ON A TYPICAL DAY: PATIENT DOES NOT DRINK
HOW OFTEN DO YOU HAVE A DRINK CONTAINING ALCOHOL: NEVER

## (undated) DEVICE — GLOVE SURG SZ 85 L12IN FNGR THK94MIL TRNSLUC YEL LTX

## (undated) DEVICE — SUTURE VCRL SZ 0 L36IN ABSRB UD L36MM CT-1 1/2 CIR J946H

## (undated) DEVICE — COVER,TABLE,44X90,STERILE: Brand: MEDLINE

## (undated) DEVICE — POUCH, INSTRUMENT, 3POCKET, INVISISHIELD: Brand: MEDLINE

## (undated) DEVICE — SET COLL TBNG L6FT DIA3/8IN W/ INTEGR SWVL HNDL SLIP RNG M

## (undated) DEVICE — SHEARS ENDOSCP L36CM DIA5MM ULTRASONIC CRV TIP HARM

## (undated) DEVICE — GLOVE SURG SZ 9 THK91MIL LTX FREE SYN POLYISOPRENE ANTI

## (undated) DEVICE — TROCAR: Brand: KII FIOS FIRST ENTRY

## (undated) DEVICE — SYRINGE MED 10ML TRNSLUC BRL PLUNG BLK MRK POLYPR CTRL

## (undated) DEVICE — COUNTER NDL 40 COUNT HLD 70 FOAM BLK ADH W/ MAG

## (undated) DEVICE — CHLORAPREP 26ML ORANGE

## (undated) DEVICE — SYSTEM COLL W/ TISS TRAP INCLUDE COLL CANSTR LID SET OF

## (undated) DEVICE — INTENDED FOR TISSUE SEPARATION, AND OTHER PROCEDURES THAT REQUIRE A SHARP SURGICAL BLADE TO PUNCTURE OR CUT.: Brand: BARD-PARKER ® CARBON RIB-BACK BLADES

## (undated) DEVICE — CATHETER,URETHRAL,VINYL,FEMALE,6",14FR: Brand: MEDLINE

## (undated) DEVICE — PLUMEPORT LAPAROSCOPIC SMOKE FILTRATION DEVICE: Brand: PLUMEPORT ACTIV

## (undated) DEVICE — Device

## (undated) DEVICE — TRAP TISS DISP FOR COLL SYS BERK SAFETOUCH

## (undated) DEVICE — LABEL MED MINI W/ MARKER

## (undated) DEVICE — DEVICE TRCR 12X9X3IN WHT CLSR DISP OMNICLOSE

## (undated) DEVICE — ELECTRODE PT RET AD L9FT HI MOIST COND ADH HYDRGEL CORDED

## (undated) DEVICE — TOTAL TRAY, DB, 100% SILI FOLEY, 16FR 10: Brand: MEDLINE

## (undated) DEVICE — GOWN,SIRUS,POLYRNF,BRTHSLV,XLN/XL,20/CS: Brand: MEDLINE

## (undated) DEVICE — 4-PORT MANIFOLD: Brand: NEPTUNE 2

## (undated) DEVICE — PACK,SET UP,DRAPE: Brand: MEDLINE

## (undated) DEVICE — NEEDLE INSUF L120MM ULT VERES ENDOPATH

## (undated) DEVICE — TRAY PREP DRY W/ PREM GLV 2 APPL 6 SPNG 2 UNDPD 1 OVERWRAP

## (undated) DEVICE — KIT,ANTI FOG,W/SPONGE & FLUID,SOFT PACK: Brand: MEDLINE

## (undated) DEVICE — MATTRESS OVERLAY EGGCRATE 72X33IN FOAM PAD

## (undated) DEVICE — SPONGE,LAP,18"X18",DLX,XR,ST,5/PK,40/PK: Brand: MEDLINE

## (undated) DEVICE — COVER LT HNDL BLU PLAS

## (undated) DEVICE — GAUZE,SPONGE,4"X4",16PLY,XRAY,STRL,LF: Brand: MEDLINE

## (undated) DEVICE — DRAPE, LAVH, STERILE: Brand: MEDLINE

## (undated) DEVICE — LINER PAD CONTOUR SUPER PEACH 7X14IN

## (undated) DEVICE — DRAPE EQUIP TRNSPRT CONTAINMENT FOR BK TAB

## (undated) DEVICE — SUTURE MCRYL SZ 4-0 L27IN ABSRB UD L19MM PS-2 1/2 CIR PRIM Y426H

## (undated) DEVICE — PAD,NON-ADHERENT,3X8,STERILE,LF,1/PK: Brand: MEDLINE

## (undated) DEVICE — GOWN,AURORA,NONREINFORCED,LARGE: Brand: MEDLINE

## (undated) DEVICE — LITHOTOMY DRAPE WITH FLUID CONTROL POUCH: Brand: CONVERTORS

## (undated) DEVICE — VCARE MEDIUM, UTERINE MANIPULATOR, VAGINAL-CERVICAL-AHLUWALIA'S-RETRACTOR-ELEVATOR: Brand: VCARE

## (undated) DEVICE — WARMER SCP 2 ANTIFOG LAP DISP

## (undated) DEVICE — TUBING INSUF 03UM FLTR W LUERLOCK CPC CONN

## (undated) DEVICE — TROCAR: Brand: KII® SLEEVE